# Patient Record
Sex: FEMALE | Race: WHITE | NOT HISPANIC OR LATINO | Employment: UNEMPLOYED | URBAN - METROPOLITAN AREA
[De-identification: names, ages, dates, MRNs, and addresses within clinical notes are randomized per-mention and may not be internally consistent; named-entity substitution may affect disease eponyms.]

---

## 2020-12-01 ENCOUNTER — OFFICE VISIT (OUTPATIENT)
Dept: FAMILY MEDICINE CLINIC | Facility: CLINIC | Age: 30
End: 2020-12-01

## 2020-12-01 VITALS
HEART RATE: 103 BPM | DIASTOLIC BLOOD PRESSURE: 84 MMHG | BODY MASS INDEX: 55.32 KG/M2 | OXYGEN SATURATION: 98 % | WEIGHT: 293 LBS | SYSTOLIC BLOOD PRESSURE: 122 MMHG | HEIGHT: 61 IN

## 2020-12-01 DIAGNOSIS — F33.2 SEVERE EPISODE OF RECURRENT MAJOR DEPRESSIVE DISORDER, WITHOUT PSYCHOTIC FEATURES (HCC): Primary | ICD-10-CM

## 2020-12-01 DIAGNOSIS — G47.00 INSOMNIA, UNSPECIFIED TYPE: ICD-10-CM

## 2020-12-01 DIAGNOSIS — Z23 FLU VACCINE NEED: ICD-10-CM

## 2020-12-01 DIAGNOSIS — F41.9 ANXIETY: ICD-10-CM

## 2020-12-01 DIAGNOSIS — F19.11 DRUG ABUSE IN REMISSION (HCC): ICD-10-CM

## 2020-12-01 DIAGNOSIS — E28.2 PCOS (POLYCYSTIC OVARIAN SYNDROME): ICD-10-CM

## 2020-12-01 PROCEDURE — 90471 IMMUNIZATION ADMIN: CPT | Performed by: NURSE PRACTITIONER

## 2020-12-01 PROCEDURE — 99204 OFFICE O/P NEW MOD 45 MIN: CPT | Performed by: NURSE PRACTITIONER

## 2020-12-01 PROCEDURE — 90686 IIV4 VACC NO PRSV 0.5 ML IM: CPT | Performed by: NURSE PRACTITIONER

## 2020-12-01 RX ORDER — TRAZODONE HYDROCHLORIDE 50 MG/1
50 TABLET ORAL NIGHTLY
COMMUNITY
End: 2020-12-01 | Stop reason: SDUPTHER

## 2020-12-01 RX ORDER — PAROXETINE 30 MG/1
30 TABLET, FILM COATED ORAL EVERY MORNING
COMMUNITY
End: 2020-12-01 | Stop reason: SDUPTHER

## 2020-12-01 RX ORDER — BUPROPION HYDROCHLORIDE 150 MG/1
300 TABLET ORAL DAILY
COMMUNITY
End: 2020-12-01 | Stop reason: SDUPTHER

## 2020-12-01 RX ORDER — HYDROXYZINE PAMOATE 50 MG/1
50 CAPSULE ORAL 3 TIMES DAILY PRN
COMMUNITY
End: 2020-12-01 | Stop reason: SDUPTHER

## 2020-12-01 RX ORDER — HYDROXYZINE PAMOATE 50 MG/1
50 CAPSULE ORAL 3 TIMES DAILY PRN
Qty: 90 CAPSULE | Refills: 5 | Status: SHIPPED | OUTPATIENT
Start: 2020-12-01 | End: 2021-03-22

## 2020-12-01 RX ORDER — BUPROPION HYDROCHLORIDE 150 MG/1
300 TABLET ORAL DAILY
Qty: 90 TABLET | Refills: 1 | Status: SHIPPED | OUTPATIENT
Start: 2020-12-01 | End: 2020-12-15

## 2020-12-01 RX ORDER — TRAZODONE HYDROCHLORIDE 50 MG/1
50 TABLET ORAL NIGHTLY
Qty: 90 TABLET | Refills: 1 | Status: SHIPPED | OUTPATIENT
Start: 2020-12-01 | End: 2021-03-22 | Stop reason: SDUPTHER

## 2020-12-01 RX ORDER — PAROXETINE 30 MG/1
30 TABLET, FILM COATED ORAL EVERY MORNING
Qty: 90 TABLET | Refills: 1 | Status: SHIPPED | OUTPATIENT
Start: 2020-12-01 | End: 2021-03-09

## 2020-12-01 NOTE — PROGRESS NOTES
Franca Ruiz presents today to establish care.    Establish Care (patient has been out of meds for about 3 days, high Ph-Q and DEWAYNE, patient reports that when she was one medication things were going good), Anxiety, Depression, and Nicotine Dependence     HPI   Pt presents today to establish care.  She has a past medical history of anxiety, arthritis, ovarian cyst, bipolar, and depression.  In June she entered rehab for drug abuse.  It was called [facility] and it was located in [place].  6 weeks ago she moved to a senior living house here in [place].  She realizes that she should have made an appointment sooner to get her medications refilled, but procrastinated.  She was started on Paxil, Wellbutrin, Vistaril, and Trazodone while she was at rehab.  She was doing well until she ran out of her medication.  She has been out of her medication for 3 days.  She had been taking the new dose of Paxil for a couple months.  She was only taking the medication every other day, because she knew she was going to run out.  Her depression and anxiety came back as soon as she ran out.  She only takes Vistaril as needed for anxiety.    She sleeps well as long as she takes her medication.  The trazodone has worked very well for her.    She takes Metformin for PCOS.  She denies any history of diabetes.  She last had lab work performed a couple months ago while she was in rehab.  The only abnormality she recalls is elevated liver enzymes.    She has gained 80 pounds since going into rehab.  Prior to this she did not care much about her health and did not focus on eating.  She is hoping to get started on a healthy eating regimen and starting routine physical activity.    She just started a job at [employer] and loves it.    She does not remember the last time she had a Pap smear as she was heavily involved in drugs.    Review of Systems   Constitutional: Positive for fatigue and unexpected weight gain.   HENT: Negative.    Eyes: Negative.     Respiratory: Negative.    Cardiovascular: Negative.    Gastrointestinal: Negative.    Endocrine: Negative.    Genitourinary: Negative.    Musculoskeletal: Positive for arthralgias.   Allergic/Immunologic: Negative.    Neurological: Negative.    Hematological: Negative.    Psychiatric/Behavioral: Positive for sleep disturbance and depressed mood. The patient is nervous/anxious.         Past Medical History:   Diagnosis Date   • Anxiety    • Arthritis    • Bilateral ovarian cysts    • Bipolar disorder (CMS/HCC)    • Depression         Past Surgical History:   Procedure Laterality Date   • ANKLE SURGERY Right         Family History   Problem Relation Age of Onset   • Mental illness Mother    • Arthritis Father    • Hypertension Father    • Mental illness Father    • Obesity Father    • Kidney disease Paternal Aunt    • Mental illness Paternal Aunt    • Mental illness Paternal Uncle         Social History     Socioeconomic History   • Marital status: Single     Spouse name: Not on file   • Number of children: Not on file   • Years of education: Not on file   • Highest education level: Not on file   Tobacco Use   • Smoking status: Former Smoker   • Smokeless tobacco: Never Used   Substance and Sexual Activity   • Alcohol use: Never     Frequency: Never   • Drug use: Not Currently     Types: Benzodiazepines, Methamphetamines, MDMA (ecstacy), Oxycodone     Comment: since June 2020        Current Outpatient Medications on File Prior to Visit   Medication Sig Dispense Refill   • [DISCONTINUED] buPROPion XL (WELLBUTRIN XL) 150 MG 24 hr tablet Take 300 mg by mouth Daily.     • [DISCONTINUED] hydrOXYzine pamoate (VISTARIL) 50 MG capsule Take 50 mg by mouth 3 (Three) Times a Day As Needed for Itching.     • [DISCONTINUED] metFORMIN (GLUCOPHAGE) 500 MG tablet Take 1,000 mg by mouth Daily With Breakfast.     • [DISCONTINUED] PARoxetine (PAXIL) 30 MG tablet Take 30 mg by mouth Every Morning.     • [DISCONTINUED] traZODone  "(DESYREL) 50 MG tablet Take 50 mg by mouth Every Night.       No current facility-administered medications on file prior to visit.        Allergies   Allergen Reactions   • Augmentin [Amoxicillin-Pot Clavulanate] Other (See Comments)     Throat blisters   • Vantin [Cefpodoxime] Other (See Comments)     Throat blisters        Vitals:    12/01/20 1225   BP: 122/84   BP Location: Left arm   Patient Position: Sitting   Cuff Size: Large Adult   Pulse: 103   SpO2: 98%   Weight: (!) 164 kg (362 lb 6.4 oz)   Height: 154.9 cm (61\")        Physical Exam  Vitals signs reviewed.   Constitutional:       Appearance: Normal appearance. She is obese.   HENT:      Head: Normocephalic and atraumatic.      Right Ear: Tympanic membrane, ear canal and external ear normal.      Left Ear: Tympanic membrane, ear canal and external ear normal.      Nose: Nose normal.      Mouth/Throat:      Mouth: Mucous membranes are moist.      Pharynx: Oropharynx is clear.   Cardiovascular:      Rate and Rhythm: Normal rate and regular rhythm.      Pulses: Normal pulses.      Heart sounds: Normal heart sounds.   Pulmonary:      Effort: Pulmonary effort is normal.      Breath sounds: Normal breath sounds.   Abdominal:      General: Bowel sounds are normal.      Palpations: Abdomen is soft.      Tenderness: There is no abdominal tenderness. There is no guarding or rebound.   Skin:     General: Skin is warm and dry.   Neurological:      General: No focal deficit present.      Mental Status: She is alert and oriented to person, place, and time.   Psychiatric:         Mood and Affect: Mood normal.         Behavior: Behavior normal.         Thought Content: Thought content normal.         Judgment: Judgment normal.        PHQ-9 Depression Screening  Little interest or pleasure in doing things? 3   Feeling down, depressed, or hopeless? 3   Trouble falling or staying asleep, or sleeping too much? 3   Feeling tired or having little energy? 3   Poor appetite or " overeating? 3   Feeling bad about yourself - or that you are a failure or have let yourself or your family down? 2   Trouble concentrating on things, such as reading the newspaper or watching television? 3   Moving or speaking so slowly that other people could have noticed? Or the opposite - being so fidgety or restless that you have been moving around a lot more than usual? 2   Thoughts that you would be better off dead, or of hurting yourself in some way? 1(no plans or actions, fleeting thoughts)   PHQ-9 Total Score 23   If you checked off any problems, how difficult have these problems made it for you to do your work, take care of things at home, or get along with other people? Extremely dIfficult     Diagnoses and all orders for this visit:    1. Severe episode of recurrent major depressive disorder, without psychotic features (CMS/HCC) (Primary) -chronic issue for patient that was well controlled with medication prior to running out.  Refill Wellbutrin 300 mg daily and Paxil 30 mg daily. PHQ-9 Total Score: 23.  Patient does have SI, but no plan or intention.  She feels like things will be much better once her medications are back in her system.  Continue with counseling monthly.  -     buPROPion XL (WELLBUTRIN XL) 150 MG 24 hr tablet; Take 2 tablets by mouth Daily.  Dispense: 90 tablet; Refill: 1  -     PARoxetine (PAXIL) 30 MG tablet; Take 1 tablet by mouth Every Morning.  Dispense: 90 tablet; Refill: 1    2. Insomnia, unspecified type -well-controlled with trazodone.  Refill trazodone 50 mg at bedtime.  -     traZODone (DESYREL) 50 MG tablet; Take 1 tablet by mouth Every Night.  Dispense: 90 tablet; Refill: 1    3. PCOS (polycystic ovarian syndrome) -she takes Metformin for this.  She denies history of diabetes.  Refill Metformin 1000 mg daily.  -     metFORMIN (GLUCOPHAGE) 1000 MG tablet; Take 1 tablet by mouth Daily With Breakfast.  Dispense: 90 tablet; Refill: 1    4. Anxiety -typically well controlled  with medication, but has been increased since running out 3 days ago.  Refill Wellbutrin and Paxil.  Refill Vistaril 50 mg 3 times a day as needed.  Robert 7 score 21.    -     hydrOXYzine pamoate (VISTARIL) 50 MG capsule; Take 1 capsule by mouth 3 (Three) Times a Day As Needed for Itching.  Dispense: 90 capsule; Refill: 5    5.  Drug Abuse in remission -patient has not used since June.  She was in a rehab called [facility] in [place].  Congratulated patient on her success.  She feels very positive about this and feels like she will be able to maintain long-term.    5. Flu vaccine need  -     Fluarix Quad >6 Months ([id number])    Patient has not had a Pap smear in many years.  She will schedule an appointment in 1 month for physical and Pap.    Patient signed ANGIE to get lab work results from rehab.    Return in about 4 weeks (around 12/29/2020) for Annual physical with pap.    Coral Pedro, APRN

## 2020-12-14 DIAGNOSIS — F33.2 SEVERE EPISODE OF RECURRENT MAJOR DEPRESSIVE DISORDER, WITHOUT PSYCHOTIC FEATURES (HCC): ICD-10-CM

## 2020-12-15 RX ORDER — BUPROPION HYDROCHLORIDE 150 MG/1
300 TABLET ORAL DAILY
Qty: 90 TABLET | Refills: 1 | Status: SHIPPED | OUTPATIENT
Start: 2020-12-15 | End: 2021-03-09 | Stop reason: SDUPTHER

## 2020-12-15 NOTE — TELEPHONE ENCOUNTER
Called [facility] spoke with Brianne the prescription was sent over wrong for 2 daily #90 made it 45 day fill they used the 1 refill and gave her a 90 day supply on 12/1/20, patient left or lost her whole prescription over the weekend so the pharmacy is requesting a new prescription be sent they have already spoken with her insurance yesterday to get it covered.

## 2021-03-09 ENCOUNTER — TELEMEDICINE (OUTPATIENT)
Dept: FAMILY MEDICINE CLINIC | Facility: CLINIC | Age: 31
End: 2021-03-09

## 2021-03-09 DIAGNOSIS — E28.2 PCOS (POLYCYSTIC OVARIAN SYNDROME): ICD-10-CM

## 2021-03-09 DIAGNOSIS — F33.2 SEVERE EPISODE OF RECURRENT MAJOR DEPRESSIVE DISORDER, WITHOUT PSYCHOTIC FEATURES (HCC): ICD-10-CM

## 2021-03-09 DIAGNOSIS — E66.01 MORBID (SEVERE) OBESITY DUE TO EXCESS CALORIES (HCC): Primary | ICD-10-CM

## 2021-03-09 DIAGNOSIS — R53.83 FATIGUE, UNSPECIFIED TYPE: ICD-10-CM

## 2021-03-09 DIAGNOSIS — F19.11 DRUG ABUSE IN REMISSION (HCC): ICD-10-CM

## 2021-03-09 PROCEDURE — 99214 OFFICE O/P EST MOD 30 MIN: CPT | Performed by: NURSE PRACTITIONER

## 2021-03-09 RX ORDER — BUPROPION HYDROCHLORIDE 150 MG/1
300 TABLET ORAL DAILY
Qty: 90 TABLET | Refills: 1 | Status: SHIPPED | OUTPATIENT
Start: 2021-03-09 | End: 2021-03-22 | Stop reason: SDUPTHER

## 2021-03-09 RX ORDER — NALTREXONE HYDROCHLORIDE 50 MG/1
50 TABLET, FILM COATED ORAL DAILY
Qty: 30 TABLET | Refills: 2 | Status: SHIPPED | OUTPATIENT
Start: 2021-03-09 | End: 2021-05-19 | Stop reason: ALTCHOICE

## 2021-03-09 NOTE — PROGRESS NOTES
Chief Complaint  Depression    You have chosen to receive care through a telehealth visit.  Do you consent to use a video/audio connection for your medical care today? Yes    Subjective          Franca Ruiz presents to [facility]     History of Present Illness  Pt states that she has been doing ok since her last visit.  She has been thinking about the weight loss issue she has been having.  She had talked last time about doing something about this.  She thinks that her weight has stayed the same.  She has made some changes to her lifestyle.  She has been eating better and has been more active.  She walks 4 days per week for 30 minutes to 1 hour.  She has started a new job where she is more active.  She works at [employer].  She is interested in starting at the bariatric clinic.    Her mood is doing well.  Her sleep is staying stable.    She has been having cravings.  She took Naltrexone while she was in rehab.  She had to go back to rehab on 1/27 to 2/25.  She feels like sometimes she does ok.  She is worried about the cravings coming back again and this causing her to backslide.  She would really like to stay sober.  She would like to restart Naltrexone.    Objective   Vital Signs:   There were no vitals taken for this visit.      Physical Exam  Constitutional:       Appearance: Normal appearance.   HENT:      Head: Normocephalic and atraumatic.   Neurological:      General: No focal deficit present.      Mental Status: She is alert and oriented to person, place, and time.   Psychiatric:         Mood and Affect: Mood normal.         Behavior: Behavior normal.         Thought Content: Thought content normal.         Judgment: Judgment normal.          Result Review :                 Assessment and Plan    Diagnoses and all orders for this visit:    1. Morbid (severe) obesity due to excess calories (CMS/HCC) (Primary) -patient states that she is not lost any weight since her last visit  despite changes to her diet and being more active.  She states in the past that her provider was going to prescribe phentermine.  Discussed that this medication can only be taken for 12 weeks and after that she would have to stop it and her weight is likely to be regained.  Patient verbalized understanding.  Discussed referral to bariatric surgery and patient is agreeable.  She is taking Wellbutrin and will be starting Naltrexone which both can help with weight loss also.  BMI 56.68.  -     Ambulatory Referral to Bariatric Surgery    2. Fatigue, unspecified type -patient states that she has been staying very tired.  Will check labs to ensure there are no abnormalities contributing to this.    -     CBC & Differential; Future  -     Comprehensive Metabolic Panel; Future  -     TSH Rfx On Abnormal To Free T4; Future  -     Vitamin D 25 Hydroxy; Future  -     Vitamin B12; Future    3. Drug abuse in remission (CMS/HCC) -pt did have a relapse in January and went back to rehab for 1 month.  Pt states that she is doing ok at present, but does feel like she has been having issues with cravings again.  She would like to restart Naltrexone.  She took it while she was in rehab and tolerated it well.  Start Naltrexone 50mg daily.  Will refer patient to behavioral health for counseling.  -     naltrexone (DEPADE) 50 MG tablet; Take 1 tablet by mouth Daily.  Dispense: 30 tablet; Refill: 2    4. Severe episode of recurrent major depressive disorder, without psychotic features (CMS/HCC) -patient feels like her mood is doing well at present.  Continue Wellbutrin 300 mg daily.  -     buPROPion XL (WELLBUTRIN XL) 150 MG 24 hr tablet; Take 2 tablets by mouth Daily.  Dispense: 90 tablet; Refill: 1    5. PCOS (polycystic ovarian syndrome) -she has not taken Metformin since going back into rehab.  Refill Metformin 1000 mg daily.  -     metFORMIN (GLUCOPHAGE) 1000 MG tablet; Take 1 tablet by mouth Daily With Breakfast.  Dispense: 90  tablet; Refill: 1      Follow Up   Return in about 4 weeks (around 4/6/2021) for pap smear.  Patient was given instructions and counseling regarding her condition or for health maintenance advice. Please see specific information pulled into the AVS if appropriate.

## 2021-03-22 ENCOUNTER — TELEMEDICINE (OUTPATIENT)
Dept: PSYCHIATRY | Facility: CLINIC | Age: 31
End: 2021-03-22

## 2021-03-22 DIAGNOSIS — G47.9 SLEEP DIFFICULTIES: Chronic | ICD-10-CM

## 2021-03-22 DIAGNOSIS — F15.20: Primary | Chronic | ICD-10-CM

## 2021-03-22 DIAGNOSIS — F41.1 GENERALIZED ANXIETY DISORDER: Chronic | ICD-10-CM

## 2021-03-22 DIAGNOSIS — F39 UNSPECIFIED MOOD (AFFECTIVE) DISORDER (HCC): Chronic | ICD-10-CM

## 2021-03-22 PROCEDURE — 90792 PSYCH DIAG EVAL W/MED SRVCS: CPT | Performed by: NURSE PRACTITIONER

## 2021-03-22 RX ORDER — BUPROPION HYDROCHLORIDE 300 MG/1
300 TABLET ORAL EVERY MORNING
Qty: 30 TABLET | Refills: 0 | Status: SHIPPED | OUTPATIENT
Start: 2021-03-22 | End: 2021-04-21 | Stop reason: SDUPTHER

## 2021-03-22 RX ORDER — ARIPIPRAZOLE 5 MG/1
5 TABLET ORAL NIGHTLY
Qty: 30 TABLET | Refills: 0 | Status: SHIPPED | OUTPATIENT
Start: 2021-03-22 | End: 2021-04-21 | Stop reason: SINTOL

## 2021-03-22 RX ORDER — TRAZODONE HYDROCHLORIDE 50 MG/1
TABLET ORAL
Qty: 30 TABLET | Refills: 0 | Status: SHIPPED | OUTPATIENT
Start: 2021-03-22 | End: 2021-07-14

## 2021-03-22 RX ORDER — BUSPIRONE HYDROCHLORIDE 10 MG/1
TABLET ORAL
Qty: 60 TABLET | Refills: 0 | Status: SHIPPED | OUTPATIENT
Start: 2021-03-22 | End: 2021-04-21 | Stop reason: SDUPTHER

## 2021-03-22 NOTE — TREATMENT PLAN
Multi-Disciplinary Problems (from Behavioral Health Treatment Plan)    Active Problems     Problem: Depression  Start Date: 03/22/21    Problem Details: The patient self-scales this problem as a 8 with 10 being the worst.        Goal Priority Start Date Expected End Date End Date    Patient will demonstrate the ability to initiate new constructive life skills outside of sessions on a consistent basis. -- 03/22/21 -- --    Goal Details: Progress toward goal:  Not appropriate to rate progress toward goal since this is the initial treatment plan.        Goal Intervention Frequency Start Date End Date    Assist patient in setting attainable activities of daily living goals. PRN 03/22/21 --    Goal Intervention Frequency Start Date End Date    Provide education about depression Q Month 03/22/21 --    Intervention Details: Duration of treatment until until remission of symptoms.        Goal Intervention Frequency Start Date End Date    Assist patient in developing healthy coping strategies. Q Month 03/22/21 --    Intervention Details: Duration of treatment until until remission of symptoms.              Problem: Mood Instability  Start Date: 03/22/21    Problem Details: The patient self-scales this problem as a 6 with 10 being the worst.        Goal Priority Start Date Expected End Date End Date    Patient will achieve mood stability as evidenced by controlled behavior and a more deliberate thought process -- 03/22/21 -- --    Goal Details: Progress toward goal:  Not appropriate to rate progress toward goal since this is the initial treatment plan.        Goal Intervention Frequency Start Date End Date    Provide structure and focus to patient's thoughts and actions by establishing plans and routine. Q Month 03/22/21 --    Intervention Details: Duration of treatment until until remission of symptoms.        Goal Intervention Frequency Start Date End Date    Assist patient in setting responsible goals and limits in  behavior. Q Month 03/22/21 --    Intervention Details: Duration of treatment until until remission of symptoms.                           I have discussed and reviewed this treatment plan with the patient and/or guardian.  The patient has verbally agreed with this treatment plan (no signatures are obtained at today's visit as the patient is a telehealth patient and is unable to print and sign this document, therefore verbal agreement is obtained).  .
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
## 2021-03-24 ENCOUNTER — TELEMEDICINE (OUTPATIENT)
Dept: PSYCHIATRY | Facility: CLINIC | Age: 31
End: 2021-03-24

## 2021-03-24 DIAGNOSIS — F33.1 MODERATE EPISODE OF RECURRENT MAJOR DEPRESSIVE DISORDER (HCC): Primary | ICD-10-CM

## 2021-03-24 PROCEDURE — 90791 PSYCH DIAGNOSTIC EVALUATION: CPT | Performed by: COUNSELOR
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
## 2021-03-27 PROCEDURE — U0004 COV-19 TEST NON-CDC HGH THRU: HCPCS | Performed by: FAMILY MEDICINE

## 2021-04-16 ENCOUNTER — TELEMEDICINE (OUTPATIENT)
Dept: PSYCHIATRY | Facility: CLINIC | Age: 31
End: 2021-04-16

## 2021-04-16 DIAGNOSIS — F33.1 MODERATE EPISODE OF RECURRENT MAJOR DEPRESSIVE DISORDER (HCC): Primary | ICD-10-CM

## 2021-04-16 PROCEDURE — 90832 PSYTX W PT 30 MINUTES: CPT | Performed by: COUNSELOR

## 2021-04-16 NOTE — PROGRESS NOTES
Date: April 19, 2021  Time In: 12:28 PM  Time Out: 1:03 PM  This provider is located at the [facility] (through [facility]), [address], [place] [address] using a secure [employer] Video Visit through [employer]. Patient is being seen remotely via telehealth at home address in [place] and stated they are in a secure environment for this session. The patient's condition being diagnosed/treated is appropriate for telemedicine. The provider identified herself as well as her credentials. The patient, and/or patients guardian, consent to be seen remotely, and when consent is given they understand that the consent allows for patient identifiable information to be sent to a third party as needed. They may refuse to be seen remotely at any time. The electronic data is encrypted and password protected, and the patient and/or guardian has been advised of the potential risks to privacy not withstanding such measures.     You have chosen to receive care through a telehealth visit.  Do you consent to use a video/audio connection for your medical care today? Yes    PROGRESS NOTE  Data:  Franca Ruiz is a 30 y.o. female who presents today for follow up and care planning. Patient was able to identify the need to improve her coping skills and process feelings of anger and anxiety. Patient lost a half brother to a drug overdose last week and states that this incident put a lot of things in perspective for her. Patient states that she is going to start MAT and receive drug counseling through that program and wants to receive therapy through [facility] for other symptoms and to process her past.     Chief Complaint: feelings of grief, anger, and anxiety and self-reflection    History of Present Illness: Patient has dealt with issues of anxiety and depression since she was a young child.      Clinical Maneuvering/Intervention: Care planning and discussion of problem areas using CBT that have  led to the need for therapy and the recent loss of the patient's half brother    (Scales based on 0 - 10 with 10 being the worst)  Depression: 7 Anxiety: 6       Assisted patient in developing a care plan to meet her concerns. Also assisted patient with processing above session content; acknowledged and normalized patient’s thoughts, feelings, and concerns in regard to the need for therapy and self-reflection/grief in regard to the loss of her brother.  Rationalized patient thought process regarding past trauma and abandonment.  Discussed triggers associated with patient's anger and anxiety.  Also discussed coping skills for patient to implement such as self-care, positive self-talk and increasing methods of accountability.    Allowed patient to freely discuss issues without interruption or judgment. Provided safe, confidential environment to facilitate the development of positive therapeutic relationship and encourage open, honest communication. Assisted patient in identifying risk factors which would indicate the need for higher level of care including thoughts to harm self or others and/or self-harming behavior and encouraged patient to contact this office, call 911, or present to the nearest emergency room should any of these events occur. Discussed crisis intervention services and means to access. Patient adamantly and convincingly denies current suicidal or homicidal ideation or perceptual disturbance.    Assessment:   Assessment   Patient was able to establish as baseline for the identified problems on her care plan.  However, patient continues to struggle with anger and anxiety but was proud to report that she is now at her longest time period with out a relapse but continues to have impairment in important areas of functioning.  A result, they can be reasonably expected to continue to benefit from treatment and would likely be at increased risk for decompensation otherwise.    Mental Status Exam:   Hygiene:    good  Cooperation:  Cooperative  Eye Contact:  Good  Psychomotor Behavior:  Appropriate  Affect:  Appropriate  Mood: fluctates  Speech:  Normal  Thought Process:  Goal directed  Thought Content:  Normal  Suicidal:  None  Homicidal:  None  Hallucinations:  None  Delusion:  None  Memory:  Deficits- some from substance use  Orientation:  Person, Place, Time and Situation  Reliability:  fair  Insight:  Good  Judgement:  Fair  Impulse Control:  Fair  Physical/Medical Issues:  No        Patient's Support Network Includes:  significant other and father    Functional Status: Moderate impairment     Progress toward goal: Not at goal; goals and baselines were established today.    Prognosis: Guarded with Ongoing Treatment         Plan:    Patient will continue in individual outpatient therapy with focus on improved functioning and coping skills, maintaining stability, and avoiding decompensation and the need for higher level of care.    Patient will adhere to medication regimen as prescribed and report any side effects. Patient will contact this office, call 911 or present to the nearest emergency room should suicidal or homicidal ideations occur. Provide Cognitive Behavioral Therapy and Solution Focused Therapy to improve functioning, maintain stability, and avoid decompensation and the need for higher level of care.     Return in about 4 weeks, or earlier if symptoms worsen or fail to improve.           VISIT DIAGNOSIS:     ICD-10-CM ICD-9-CM   1. Moderate episode of recurrent major depressive disorder (CMS/[facility])  F33.1 296.32             This document has been electronically signed by DELVIN Osullivan  April 19, 2021 10:51 EDT      Part of this note may be an electronic transcription/translation of spoken language to printed text using the Dragon Dictation System.

## 2021-04-21 ENCOUNTER — PRIOR AUTHORIZATION (OUTPATIENT)
Dept: PSYCHIATRY | Facility: CLINIC | Age: 31
End: 2021-04-21

## 2021-04-21 ENCOUNTER — TELEMEDICINE (OUTPATIENT)
Dept: PSYCHIATRY | Facility: CLINIC | Age: 31
End: 2021-04-21

## 2021-04-21 DIAGNOSIS — F31.30 BIPOLAR I DISORDER, MOST RECENT EPISODE DEPRESSED (HCC): Primary | Chronic | ICD-10-CM

## 2021-04-21 DIAGNOSIS — F15.20: Chronic | ICD-10-CM

## 2021-04-21 DIAGNOSIS — G47.9 SLEEP DIFFICULTIES: Chronic | ICD-10-CM

## 2021-04-21 DIAGNOSIS — F41.1 GENERALIZED ANXIETY DISORDER: Chronic | ICD-10-CM

## 2021-04-21 PROCEDURE — 99214 OFFICE O/P EST MOD 30 MIN: CPT | Performed by: NURSE PRACTITIONER

## 2021-04-21 RX ORDER — BUSPIRONE HYDROCHLORIDE 10 MG/1
TABLET ORAL
Qty: 60 TABLET | Refills: 0 | Status: SHIPPED | OUTPATIENT
Start: 2021-04-21 | End: 2021-06-14 | Stop reason: SDUPTHER

## 2021-04-21 RX ORDER — BUPROPION HYDROCHLORIDE 300 MG/1
300 TABLET ORAL EVERY MORNING
Qty: 30 TABLET | Refills: 0 | Status: SHIPPED | OUTPATIENT
Start: 2021-04-21 | End: 2021-06-14 | Stop reason: SDUPTHER
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
## 2021-05-06 ENCOUNTER — PRIOR AUTHORIZATION (OUTPATIENT)
Dept: PSYCHIATRY | Facility: CLINIC | Age: 31
End: 2021-05-06

## 2021-05-06 NOTE — TELEPHONE ENCOUNTER
KAVITA HAS ALREADY DONE THE PA ON THIS MEDICATION FOR PATIENT , CALLED THE PHARMACY AND HAD THEM TO RERUN IT , APPROVED

## 2021-05-19 ENCOUNTER — TELEMEDICINE (OUTPATIENT)
Dept: PSYCHIATRY | Facility: CLINIC | Age: 31
End: 2021-05-19

## 2021-05-19 DIAGNOSIS — F41.1 GENERALIZED ANXIETY DISORDER: Chronic | ICD-10-CM

## 2021-05-19 DIAGNOSIS — G47.9 SLEEP DIFFICULTIES: Chronic | ICD-10-CM

## 2021-05-19 DIAGNOSIS — F15.20: Chronic | ICD-10-CM

## 2021-05-19 DIAGNOSIS — F31.30 BIPOLAR I DISORDER, MOST RECENT EPISODE DEPRESSED (HCC): Primary | Chronic | ICD-10-CM

## 2021-05-19 PROCEDURE — 99214 OFFICE O/P EST MOD 30 MIN: CPT | Performed by: NURSE PRACTITIONER

## 2021-05-19 RX ORDER — BUPRENORPHINE HYDROCHLORIDE AND NALOXONE HYDROCHLORIDE DIHYDRATE 8; 2 MG/1; MG/1
1 TABLET SUBLINGUAL 2 TIMES DAILY
COMMUNITY
Start: 2021-04-28

## 2021-05-19 NOTE — PROGRESS NOTES
"This provider is located at the [facility] (through [facility]), [address], [facility], [address] using a secure Rainhart Video Visit through [employer]. Patient is being seen remotely via telehealth at their home address in [place], and stated they are in a secure environment for this session. The patient's condition being diagnosed/treated is appropriate for telemedicine. The provider identified herself as well as her credentials.   The patient, and/or patients guardian, consent to be seen remotely, and when consent is given they understand that the consent allows for patient identifiable information to be sent to a third party as needed.   They may refuse to be seen remotely at any time. The electronic data is encrypted and password protected, and the patient and/or guardian has been advised of the potential risks to privacy not withstanding such measures.    You have chosen to receive care through a telehealth visit.  Do you consent to use a video/audio connection for your medical care today? Yes        Subjective   Franca Ruiz is a 30 y.o. female who presents today for follow up    Chief Complaint:  Depression, anxiety, substance abuse    Accompanied by: Pt was alone for duration of appointment    History of Present Illness:   Pt reports that she is frustrated because she is out of the Abilify and she hasn't been able to start the Vraylar. Our records shows that it has been approved, but the pharmacy keeps telling pt is hasn't been. Pt's mood has been \"shitty\". She hasn't been on the Abilify for about two weeks and \"I've been rolling with my wonderful personality\". Pt states this sarcastically. Pt is sleeping well since she has been on the Suboxone. She started it about three weeks ago. Pt finds it effective. Appetite has been \"nonexistent\". Pt reports weight loss, but cannot give the number of lbs lost. Pt has not been very social with others. Pt has been avoiding other " "people \"as much as possible\". The patient denies any new medical problems since last appointment with this facility. The patient reports compliance with current medication regimen. The patient denies any current side effects from her current medication regimen. The patient denies any abnormal muscle movements or tics. The patient denies any suicidal or homicidal ideations, plans, or intent at today's encounter and is convincing.  The patient denies any auditory hallucinations or visual hallucinations. The patient does not endorse any significant symptoms consistent with leidy or psychosis at today's encounter.        Prior Psychiatric Medications:  Wellbutrin XL: is helpful  Naltrexone: is helpful   Hydroxyzine: helps, but is sedating  Trazodone: takes PRN and is helpful  Buspar: unsure if effective  Depakote ER: may be beneficial  Paxil:   Zoloft  Celexa  Prozac: may have been helpful  Cymbalta: may have been helpful  Seroquel: side effects; too sedating  Abilify: sedating        The following portions of the patient's history were reviewed and updated as appropriate: allergies, current medications, past family history, past medical history, past social history, past surgical history and problem list.          Past Medical History:  Past Medical History:   Diagnosis Date   • Anxiety    • Arthritis    • Bilateral ovarian cysts    • Bipolar disorder (CMS/[facility])    • Depression        Social History:  Social History     Socioeconomic History   • Marital status: Single     Spouse name: Not on file   • Number of children: Not on file   • Years of education: Not on file   • Highest education level: Not on file   Tobacco Use   • Smoking status: Former Smoker   • Smokeless tobacco: Never Used   Substance and Sexual Activity   • Alcohol use: Never   • Drug use: Not Currently     Types: Benzodiazepines, Methamphetamines, MDMA (ecstacy), Oxycodone     Comment: January 21, 2021 (Last use)   • Sexual activity: Yes     Birth " control/protection: Condom       Family History:  Family History   Problem Relation Age of Onset   • Mental illness Mother    • Arthritis Father    • Hypertension Father    • Mental illness Father    • Obesity Father    • Bipolar disorder Father    • Alcohol abuse Father    • Kidney disease Paternal Aunt    • Mental illness Paternal Aunt    • Bipolar disorder Paternal Aunt    • Mental illness Paternal Uncle        Past Surgical History:  Past Surgical History:   Procedure Laterality Date   • ANKLE SURGERY Right        Problem List:  Patient Active Problem List   Diagnosis   • Moderate episode of recurrent major depressive disorder (CMS/HCC)       Allergy:   Allergies   Allergen Reactions   • Augmentin [Amoxicillin-Pot Clavulanate] Other (See Comments)     Throat blisters   • Vantin [Cefpodoxime] Other (See Comments)     Throat blisters        Current Medications:   Current Outpatient Medications   Medication Sig Dispense Refill   • buprenorphine-naloxone (SUBOXONE) 8-2 MG per SL tablet      • buPROPion XL (Wellbutrin XL) 300 MG 24 hr tablet Take 1 tablet by mouth Every Morning for 30 days. 30 tablet 0   • busPIRone (BUSPAR) 10 MG tablet Take 1 tablet PO BID 60 tablet 0   • Cariprazine HCl (Vraylar) 1.5 MG capsule capsule Take 1 capsule by mouth Daily for 30 days. 30 capsule 0   • metFORMIN (GLUCOPHAGE) 1000 MG tablet Take 1 tablet by mouth Daily With Breakfast. 90 tablet 1   • traZODone (DESYREL) 50 MG tablet Take 1/2-1 tablet PO QHS PRN for sleep 30 tablet 0     No current facility-administered medications for this visit.       Review of Symptoms:    Review of Systems   Constitutional: Positive for activity change, appetite change, fatigue and unexpected weight loss.   Psychiatric/Behavioral: Positive for agitation, decreased concentration, dysphoric mood, sleep disturbance, depressed mood and stress. The patient is nervous/anxious.          Physical Exam:   Due to the remote nature of this encounter (virtual  encounter), vitals were unable to be obtained.  Height stated at 61 inches.  Weight stated at 275 pounds.      Physical Exam  Neurological:      Mental Status: She is alert.   Psychiatric:         Attention and Perception: Attention and perception normal.         Mood and Affect: Mood is depressed. Affect is blunt.         Speech: Speech normal.         Behavior: Behavior is agitated.         Thought Content: Thought content normal. Thought content is not paranoid or delusional. Thought content does not include homicidal or suicidal ideation. Thought content does not include homicidal or suicidal plan.         Cognition and Memory: Cognition and memory normal.         Judgment: Judgment is impulsive.           Mental Status Exam:   Hygiene:   fair  Cooperation:  Cooperative  Eye Contact:  Poor  Psychomotor Behavior:  Appropriate  Affect:  Blunted  Mood: irritable  Speech:  Normal  Thought Process:  Goal directed  Thought Content:  Normal  Suicidal:  None  Homicidal:  None  Hallucinations:  None  Delusion:  None  Memory:  Intact  Orientation:  Person, Place, Time and Situation  Reliability:  fair  Insight:  Fair  Judgement:  Fair  Impulse Control:  Fair  Physical/Medical Issues:  No          Previous Provider notes and available records reviewed by this APRN at today's encounter.         Lab Results:   No visits with results within 1 Month(s) from this visit.   Latest known visit with results is:   Admission on 03/27/2021, Discharged on 03/27/2021   Component Date Value Ref Range Status   • SARS-CoV-2 SCOTT 03/27/2021 Not Detected  Not Detected Final         Assessment/Plan   Problems Addressed this Visit     None      Visit Diagnoses     Bipolar I disorder, most recent episode depressed (CMS/HCC)  (Chronic)   -  Primary    Generalized anxiety disorder  (Chronic)       Amphetamine-type substance use disorder, moderate (CMS/HCC)  (Chronic)       Sleep difficulties  (Chronic)         Diagnoses       Codes Comments     Bipolar I disorder, most recent episode depressed (CMS/HCC)    -  Primary ICD-10-CM: F31.30  ICD-9-CM: 296.50     Generalized anxiety disorder     ICD-10-CM: F41.1  ICD-9-CM: 300.02     Amphetamine-type substance use disorder, moderate (CMS/HCC)     ICD-10-CM: F15.20  ICD-9-CM: 305.70     Sleep difficulties     ICD-10-CM: G47.9  ICD-9-CM: 780.50           Visit Diagnoses:    ICD-10-CM ICD-9-CM   1. Bipolar I disorder, most recent episode depressed (CMS/HCC)  F31.30 296.50   2. Generalized anxiety disorder  F41.1 300.02   3. Amphetamine-type substance use disorder, moderate (CMS/HCC)  F15.20 305.70   4. Sleep difficulties  G47.9 780.50          GOALS:  Short Term Goals: Patient will be compliant with medication, and patient will have no significant medication related side effects.  Patient will be engaged in psychotherapy as indicated.  Patient will report subjective improvement of symptoms.  Long term goals: To stabilize mood and treat/improve subjective symptoms, the patient will stay out of the hospital, the patient will be at an optimal level of functioning, and the patient will take all medications as prescribed.  The patient verbalized understanding and agreement with goals that were mutually set.      TREATMENT PLAN:   -Continue Trazodone 25-50 mg PO QHS PRN for sleep.   -Continue Buspar 10 mg PO BID for anxiety.   -Continue Wellbutrin  mg PO QAM for depression.   -Pt is prescribed Suboxone from another provider  -Start Vraylar 1.5 mg PO Daily for bipolar disorder. (Pt hasn't started it from last appointment)    Medication and treatment options, both pharmacological and non-pharmacological treatment options, discussed during today's visit, including any off label use of medication. Patient acknowledged and verbally consented with current treatment plan and was educated on the importance of compliance with treatment and follow-up appointments.        MEDICATION ISSUES:    Discussed treatment plan and  medication options of prescribed medication as well as the risks, benefits, any black box warnings, and side effects including potential falls, possible impaired driving, and metabolic adversities among others, including any off label use of medication. Patient is agreeable to call the office with any worsening of symptoms or onset of side effects, or if any concerns or questions arise.  The contact information for the office is made available to the patient. Patient is agreeable to call 911 or go to the nearest ER should they begin having any SI/HI, or if any urgent concerns arise. No medication side effects or related complaints today.       MEDS ORDERED DURING VISIT:  No orders of the defined types were placed in this encounter.      Return in about 4 weeks (around 6/16/2021), or if symptoms worsen or fail to improve, for Recheck.     Treatment plan completed: 3/22/21    Progress toward goal: Not at goal    Functional Status: Severe impairment    Prognosis: Fair with Ongoing Treatment         This document has been electronically signed by JENNIFER Reed  May 19, 2021 09:57 EDT    Please note that portions of this note were completed with a voice recognition program. Efforts were made to edit dictation, but occasionally words are mistranscribed.

## 2021-05-20 ENCOUNTER — TELEMEDICINE (OUTPATIENT)
Dept: PSYCHIATRY | Facility: CLINIC | Age: 31
End: 2021-05-20

## 2021-05-20 DIAGNOSIS — F33.1 MODERATE EPISODE OF RECURRENT MAJOR DEPRESSIVE DISORDER (HCC): Primary | ICD-10-CM

## 2021-05-20 PROCEDURE — 90834 PSYTX W PT 45 MINUTES: CPT | Performed by: COUNSELOR

## 2021-06-14 ENCOUNTER — TELEMEDICINE (OUTPATIENT)
Dept: PSYCHIATRY | Facility: CLINIC | Age: 31
End: 2021-06-14

## 2021-06-14 DIAGNOSIS — F31.30 BIPOLAR I DISORDER, MOST RECENT EPISODE DEPRESSED (HCC): Primary | Chronic | ICD-10-CM

## 2021-06-14 DIAGNOSIS — G47.9 SLEEP DIFFICULTIES: Chronic | ICD-10-CM

## 2021-06-14 DIAGNOSIS — F41.1 GENERALIZED ANXIETY DISORDER: Chronic | ICD-10-CM

## 2021-06-14 DIAGNOSIS — F15.20: Chronic | ICD-10-CM

## 2021-06-14 PROCEDURE — 99214 OFFICE O/P EST MOD 30 MIN: CPT | Performed by: NURSE PRACTITIONER

## 2021-06-14 RX ORDER — BUSPIRONE HYDROCHLORIDE 10 MG/1
TABLET ORAL
Qty: 60 TABLET | Refills: 0 | Status: SHIPPED | OUTPATIENT
Start: 2021-06-14 | End: 2021-07-14 | Stop reason: SDUPTHER

## 2021-06-14 RX ORDER — BUPROPION HYDROCHLORIDE 300 MG/1
300 TABLET ORAL EVERY MORNING
Qty: 30 TABLET | Refills: 0 | Status: SHIPPED | OUTPATIENT
Start: 2021-06-14 | End: 2021-07-14 | Stop reason: SDUPTHER

## 2021-06-14 NOTE — PROGRESS NOTES
"This provider is located at the [facility] (through [facility]), [address], [place], [address] using a secure neo[employer] Video Visit through [employer]. Patient is being seen remotely via telehealth at their home address in [place], and stated they are in a secure environment for this session. The patient's condition being diagnosed/treated is appropriate for telemedicine. The provider identified herself as well as her credentials.   The patient, and/or patients guardian, consent to be seen remotely, and when consent is given they understand that the consent allows for patient identifiable information to be sent to a third party as needed.   They may refuse to be seen remotely at any time. The electronic data is encrypted and password protected, and the patient and/or guardian has been advised of the potential risks to privacy not withstanding such measures.    You have chosen to receive care through a telehealth visit.  Do you consent to use a video/audio connection for your medical care today? Yes        Subjective   Franca Ruiz is a 31 y.o. female who presents today for follow up    Chief Complaint:  Depression, anxiety, substance abuse    Accompanied by: Pt was alone for duration of appointment    History of Present Illness:   Pt reports that the Vraylar has been helping with her overall mood. Depression has improved, it tends to \"come and go\". Agitation has also improved and she is getting along with others better. However, her mood is not where she would like it to be. Sleep has been excessive, but she feels it is related to having sleep apnea and not having a machine. Pt is trying to establish care to get a machine. Pt hasn't been using the Trazodone, she hasn't been needing it.Appetite is fair. Pt has been doing well on the Suboxone. She has been clean for about 5 months. The patient denies any new medical problems or changes in medications since last appointment " with this facility. The patient reports compliance with current medication regimen. The patient denies any current side effects from her current medication regimen. The patient denies any abnormal muscle movements or tics. The patient rates their depression at a 6-7/10 on a 0-10 scale, with 10 being the worst. The patient would like to increase their medications at this visit. The patient denies any suicidal or homicidal ideations, plans, or intent at today's encounter and is convincing.  The patient denies any auditory hallucinations or visual hallucinations. The patient does not endorse any significant symptoms consistent with leidy or psychosis at today's encounter.        Prior Psychiatric Medications:  Wellbutrin XL: is helpful  Naltrexone: is helpful   Hydroxyzine: helps, but is sedating  Trazodone: takes PRN and is helpful  Buspar: unsure if effective  Depakote ER: may be beneficial  Paxil:   Zoloft  Celexa  Prozac: may have been helpful  Cymbalta: may have been helpful  Seroquel: side effects; too sedating  Abilify: sedating        The following portions of the patient's history were reviewed and updated as appropriate: allergies, current medications, past family history, past medical history, past social history, past surgical history and problem list.          Past Medical History:  Past Medical History:   Diagnosis Date   • Anxiety    • Arthritis    • Bilateral ovarian cysts    • Bipolar disorder (CMS/HCC)    • Depression    • Sleep apnea        Social History:  Social History     Socioeconomic History   • Marital status: Single     Spouse name: Not on file   • Number of children: Not on file   • Years of education: Not on file   • Highest education level: Not on file   Tobacco Use   • Smoking status: Former Smoker   • Smokeless tobacco: Never Used   Substance and Sexual Activity   • Alcohol use: Never   • Drug use: Not Currently     Types: Benzodiazepines, Methamphetamines, MDMA (ecstacy), Oxycodone      Comment: January 21, 2021 (Last use)   • Sexual activity: Yes     Birth control/protection: Condom       Family History:  Family History   Problem Relation Age of Onset   • Mental illness Mother    • Arthritis Father    • Hypertension Father    • Mental illness Father    • Obesity Father    • Bipolar disorder Father    • Alcohol abuse Father    • Kidney disease Paternal Aunt    • Mental illness Paternal Aunt    • Bipolar disorder Paternal Aunt    • Mental illness Paternal Uncle        Past Surgical History:  Past Surgical History:   Procedure Laterality Date   • ANKLE SURGERY Right        Problem List:  Patient Active Problem List   Diagnosis   • Moderate episode of recurrent major depressive disorder (CMS/HCC)       Allergy:   Allergies   Allergen Reactions   • Augmentin [Amoxicillin-Pot Clavulanate] Other (See Comments)     Throat blisters   • Vantin [Cefpodoxime] Other (See Comments)     Throat blisters        Current Medications:   Current Outpatient Medications   Medication Sig Dispense Refill   • buprenorphine-naloxone (SUBOXONE) 8-2 MG per SL tablet      • buPROPion XL (Wellbutrin XL) 300 MG 24 hr tablet Take 1 tablet by mouth Every Morning for 30 days. 30 tablet 0   • busPIRone (BUSPAR) 10 MG tablet Take 1 tablet PO BID 60 tablet 0   • Cariprazine HCl (Vraylar) 3 MG capsule capsule Take 1 capsule by mouth Daily for 30 days. 30 capsule 0   • metFORMIN (GLUCOPHAGE) 1000 MG tablet Take 1 tablet by mouth Daily With Breakfast. 90 tablet 1   • traZODone (DESYREL) 50 MG tablet Take 1/2-1 tablet PO QHS PRN for sleep 30 tablet 0     No current facility-administered medications for this visit.       Review of Symptoms:    Review of Systems   Constitutional: Positive for appetite change and fatigue.   Psychiatric/Behavioral: Positive for agitation, dysphoric mood, sleep disturbance and stress. The patient is nervous/anxious.          Physical Exam:   Due to the remote nature of this encounter (virtual encounter),  vitals were unable to be obtained.  Height stated at 61 inches.  Weight stated at 275 pounds.      Physical Exam  Neurological:      Mental Status: She is alert.   Psychiatric:         Attention and Perception: Attention and perception normal.         Mood and Affect: Mood normal.         Speech: Speech normal.         Behavior: Behavior is cooperative.         Thought Content: Thought content normal. Thought content is not paranoid or delusional. Thought content does not include homicidal or suicidal ideation. Thought content does not include homicidal or suicidal plan.         Cognition and Memory: Cognition and memory normal.      Comments: Restricted affect           Mental Status Exam:   Hygiene:   fair  Cooperation:  Cooperative  Eye Contact:  Fair  Psychomotor Behavior:  Appropriate  Affect:  Restricted  Mood: normal  Speech:  Normal  Thought Process:  Goal directed  Thought Content:  Normal  Suicidal:  None  Homicidal:  None  Hallucinations:  None  Delusion:  None  Memory:  Intact  Orientation:  Person, Place, Time and Situation  Reliability:  fair  Insight:  Fair  Judgement:  Fair  Impulse Control:  Fair  Physical/Medical Issues:  No          Previous Provider notes and available records reviewed by this APRN at today's encounter.         Lab Results:   No visits with results within 1 Month(s) from this visit.   Latest known visit with results is:   Admission on 03/27/2021, Discharged on 03/27/2021   Component Date Value Ref Range Status   • SARS-CoV-2 SCOTT 03/27/2021 Not Detected  Not Detected Final         Assessment/Plan   Problems Addressed this Visit     None      Visit Diagnoses     Bipolar I disorder, most recent episode depressed (CMS/HCC)  (Chronic)   -  Primary    Relevant Medications    buPROPion XL (Wellbutrin XL) 300 MG 24 hr tablet    busPIRone (BUSPAR) 10 MG tablet    Cariprazine HCl (Vraylar) 3 MG capsule capsule    Amphetamine-type substance use disorder, moderate (CMS/HCC)  (Chronic)        Sleep difficulties  (Chronic)       Generalized anxiety disorder  (Chronic)       Relevant Medications    buPROPion XL (Wellbutrin XL) 300 MG 24 hr tablet    busPIRone (BUSPAR) 10 MG tablet    Cariprazine HCl (Vraylar) 3 MG capsule capsule      Diagnoses       Codes Comments    Bipolar I disorder, most recent episode depressed (CMS/HCC)    -  Primary ICD-10-CM: F31.30  ICD-9-CM: 296.50     Amphetamine-type substance use disorder, moderate (CMS/HCC)     ICD-10-CM: F15.20  ICD-9-CM: 305.70     Sleep difficulties     ICD-10-CM: G47.9  ICD-9-CM: 780.50     Generalized anxiety disorder     ICD-10-CM: F41.1  ICD-9-CM: 300.02           Visit Diagnoses:    ICD-10-CM ICD-9-CM   1. Bipolar I disorder, most recent episode depressed (CMS/HCC)  F31.30 296.50   2. Amphetamine-type substance use disorder, moderate (CMS/HCC)  F15.20 305.70   3. Sleep difficulties  G47.9 780.50   4. Generalized anxiety disorder  F41.1 300.02          GOALS:  Short Term Goals: Patient will be compliant with medication, and patient will have no significant medication related side effects.  Patient will be engaged in psychotherapy as indicated.  Patient will report subjective improvement of symptoms.  Long term goals: To stabilize mood and treat/improve subjective symptoms, the patient will stay out of the hospital, the patient will be at an optimal level of functioning, and the patient will take all medications as prescribed.  The patient verbalized understanding and agreement with goals that were mutually set.      TREATMENT PLAN:   -Continue Trazodone 25-50 mg PO QHS PRN for sleep.   -Continue Buspar 10 mg PO BID for anxiety.   -Continue Wellbutrin  mg PO QAM for depression.   -Pt is prescribed Suboxone from another provider  -Increase Vraylar to 3 mg PO Daily for bipolar disorder    Medication and treatment options, both pharmacological and non-pharmacological treatment options, discussed during today's visit, including any off label use of  medication. Patient acknowledged and verbally consented with current treatment plan and was educated on the importance of compliance with treatment and follow-up appointments.        MEDICATION ISSUES:    Discussed treatment plan and medication options of prescribed medication as well as the risks, benefits, any black box warnings, and side effects including potential falls, possible impaired driving, and metabolic adversities among others, including any off label use of medication. Patient is agreeable to call the office with any worsening of symptoms or onset of side effects, or if any concerns or questions arise.  The contact information for the office is made available to the patient. Patient is agreeable to call 911 or go to the nearest ER should they begin having any SI/HI, or if any urgent concerns arise. No medication side effects or related complaints today.       MEDS ORDERED DURING VISIT:  New Medications Ordered This Visit   Medications   • buPROPion XL (Wellbutrin XL) 300 MG 24 hr tablet     Sig: Take 1 tablet by mouth Every Morning for 30 days.     Dispense:  30 tablet     Refill:  0   • busPIRone (BUSPAR) 10 MG tablet     Sig: Take 1 tablet PO BID     Dispense:  60 tablet     Refill:  0   • Cariprazine HCl (Vraylar) 3 MG capsule capsule     Sig: Take 1 capsule by mouth Daily for 30 days.     Dispense:  30 capsule     Refill:  0       Return in about 4 weeks (around 7/12/2021), or if symptoms worsen or fail to improve, for Recheck.     Treatment plan completed: 3/22/21    Progress toward goal: Not at goal    Functional Status: Severe impairment    Prognosis: Fair with Ongoing Treatment         This document has been electronically signed by JENNIFER Reed  June 14, 2021 15:57 EDT    Please note that portions of this note were completed with a voice recognition program. Efforts were made to edit dictation, but occasionally words are mistranscribed.

## 2021-07-12 ENCOUNTER — TELEMEDICINE (OUTPATIENT)
Dept: PSYCHIATRY | Facility: CLINIC | Age: 31
End: 2021-07-12

## 2021-07-12 DIAGNOSIS — F33.1 MODERATE EPISODE OF RECURRENT MAJOR DEPRESSIVE DISORDER (HCC): Primary | ICD-10-CM

## 2021-07-12 PROCEDURE — 90834 PSYTX W PT 45 MINUTES: CPT | Performed by: COUNSELOR

## 2021-07-12 NOTE — PROGRESS NOTES
Date: July 15, 2021  Time In: 10:53 AM  Time Out: 11:39 AM  This provider is located at the [facility] [address], [place] [address] using a secure [employer] Video Visit through [employer]. Patient is being seen remotely via telehealth at home address in [place] and stated they are in a secure environment for this session. The patient's condition being diagnosed/treated is appropriate for telemedicine. The provider identified herself as well as her credentials. The patient, and/or patients guardian, consent to be seen remotely, and when consent is given they understand that the consent allows for patient identifiable information to be sent to a third party as needed. They may refuse to be seen remotely at any time. The electronic data is encrypted and password protected, and the patient and/or guardian has been advised of the potential risks to privacy not withstanding such measures.     You have chosen to receive care through a telehealth visit.  Do you consent to use a video/audio connection for your medical care today? Yes    PROGRESS NOTE  Data:  Franca Ruiz is a 31 y.o. female who presents today for follow up. Patient states that her parents are getting a divorce and this has been going on for 6 weeks now. Patients states that although the woman is not her biological mother they have been together 27 years.  Patient states that the divorce is coming due to her father's inability to deal with his wife's mental health issues. Patient reports that her mother has a cycle of about 3 years in which she will have increased mental health issues and feels that her father is now actually seeing what this is like in his wife and is unable to deal with it. Patient states that she will be there for her parents as best as she can be but knows that she cannot return to her hometown as she often finds trouble there and is close to another anniversary of sobriety  and admits that maintaining sobriety is a daily struggle. Patient has broken up with her boyfriend and is worried about finances as she states that her job is not allowing overtime and she is not really making a livable wage and needs to find a new place to live as one of her roommates is pregnant and preparing for a baby. Patient states that not having a car and having to pay for rides to and from work daily is taxing on her and not allowing for saving for a car. Patient isn't able to find additional work based on not having a vehicle or the money to pay for a ride to another job and states that there is not many opportunities around her for work.     Chief Complaint: worry about family situations, housing and finances, feelings of boredom    History of Present Illness: patient has a history of substance abuse, anxiety and depression stemming from childhood      Clinical Maneuvering/Intervention: CBT to process current situations      Assisted patient in processing above session content; acknowledged and normalized patient’s thoughts, feelings, and concerns.  Rationalized patient thought process regarding her current stressors.  Discussed triggers associated with patient's feelings of boredom due to not having a car and limited positive social contacts.  Also discussed coping skills for patient to implement such as inquiring about working extra shifts, trying to find additional employment with her roommates, and thinking about hobbies that she would enjoy getting involved in.    Allowed patient to freely discuss issues without interruption or judgment. Provided safe, confidential environment to facilitate the development of positive therapeutic relationship and encourage open, honest communication. Assisted patient in identifying risk factors which would indicate the need for higher level of care including thoughts to harm self or others and/or self-harming behavior and encouraged patient to contact this office,  call 911, or present to the nearest emergency room should any of these events occur. Discussed crisis intervention services and means to access. Patient adamantly and convincingly denies current suicidal or homicidal ideation or perceptual disturbance.    Assessment:   Assessment   Patient appears to maintain relative stability as compared to their baseline.  However, patient continues to struggle with depression and anxiety and maintaining sobriety which continues to cause impairment in important areas of functioning.  A result, they can be reasonably expected to continue to benefit from treatment and would likely be at increased risk for decompensation otherwise.    Mental Status Exam:   Hygiene:   good  Cooperation:  Cooperative  Eye Contact:  Good  Psychomotor Behavior:  Appropriate  Affect:  Appropriate  Mood: fluctates  Speech:  Normal  Thought Process:  Circum  Thought Content:  Mood congruent  Suicidal:  None  Homicidal:  None  Hallucinations:  None  Delusion:  None  Memory:  Deficits  Orientation:  Person, Place, Time and Situation  Reliability:  good  Insight:  Good  Judgement:  Good  Impulse Control:  Fair  Physical/Medical Issues:  No        Patient's Support Network Includes:  parents and roomates, friends    Functional Status: Moderate impairment     Progress toward goal: Not at goal    Prognosis: Guarded with Ongoing Treatment         Plan:    Patient will continue in individual outpatient therapy with focus on improved functioning and coping skills, maintaining stability, and avoiding decompensation and the need for higher level of care.    Patient will adhere to medication regimen as prescribed and report any side effects. Patient will contact this office, call 911 or present to the nearest emergency room should suicidal or homicidal ideations occur. Provide Cognitive Behavioral Therapy and Solution Focused Therapy to improve functioning, maintain stability, and avoid decompensation and the need for  higher level of care.     Return in about 4 weeks, or earlier if symptoms worsen or fail to improve.           VISIT DIAGNOSIS:     ICD-10-CM ICD-9-CM   1. Moderate episode of recurrent major depressive disorder (CMS/[facility])  F33.1 296.32             This document has been electronically signed by DELVIN Osullivan  July 15, 2021 09:46 EDT      Part of this note may be an electronic transcription/translation of spoken language to printed text using the Dragon Dictation System.

## 2021-07-14 ENCOUNTER — TELEMEDICINE (OUTPATIENT)
Dept: PSYCHIATRY | Facility: CLINIC | Age: 31
End: 2021-07-14

## 2021-07-14 DIAGNOSIS — F41.1 GENERALIZED ANXIETY DISORDER: Chronic | ICD-10-CM

## 2021-07-14 DIAGNOSIS — F15.20: ICD-10-CM

## 2021-07-14 DIAGNOSIS — G47.9 SLEEP DIFFICULTIES: Chronic | ICD-10-CM

## 2021-07-14 DIAGNOSIS — F31.30 BIPOLAR I DISORDER, MOST RECENT EPISODE DEPRESSED (HCC): Primary | Chronic | ICD-10-CM

## 2021-07-14 PROCEDURE — 99214 OFFICE O/P EST MOD 30 MIN: CPT | Performed by: NURSE PRACTITIONER

## 2021-07-14 RX ORDER — BUPROPION HYDROCHLORIDE 300 MG/1
300 TABLET ORAL EVERY MORNING
Qty: 30 TABLET | Refills: 0 | Status: SHIPPED | OUTPATIENT
Start: 2021-07-14 | End: 2021-09-07 | Stop reason: SDUPTHER

## 2021-07-14 RX ORDER — BUSPIRONE HYDROCHLORIDE 10 MG/1
TABLET ORAL
Qty: 60 TABLET | Refills: 0 | Status: SHIPPED | OUTPATIENT
Start: 2021-07-14 | End: 2021-09-07 | Stop reason: SDUPTHER
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
## 2021-07-21 ENCOUNTER — OFFICE VISIT (OUTPATIENT)
Dept: SLEEP MEDICINE | Facility: HOSPITAL | Age: 31
End: 2021-07-21

## 2021-07-21 VITALS
DIASTOLIC BLOOD PRESSURE: 73 MMHG | WEIGHT: 293 LBS | HEART RATE: 95 BPM | BODY MASS INDEX: 55.32 KG/M2 | SYSTOLIC BLOOD PRESSURE: 128 MMHG | HEIGHT: 61 IN | OXYGEN SATURATION: 96 %

## 2021-07-21 DIAGNOSIS — R06.83 SNORING: Primary | ICD-10-CM

## 2021-07-21 DIAGNOSIS — G47.33 OBSTRUCTIVE SLEEP APNEA, ADULT: ICD-10-CM

## 2021-07-21 DIAGNOSIS — E66.01 MORBID (SEVERE) OBESITY DUE TO EXCESS CALORIES (HCC): ICD-10-CM

## 2021-07-21 PROCEDURE — 99203 OFFICE O/P NEW LOW 30 MIN: CPT | Performed by: INTERNAL MEDICINE

## 2021-07-21 NOTE — PATIENT INSTRUCTIONS

## 2021-08-21 ENCOUNTER — HOSPITAL ENCOUNTER (OUTPATIENT)
Dept: SLEEP MEDICINE | Facility: HOSPITAL | Age: 31
Discharge: HOME | End: 2021-08-21
Admitting: INTERNAL MEDICINE

## 2021-08-21 VITALS — BODY MASS INDEX: 55.32 KG/M2 | WEIGHT: 293 LBS | HEART RATE: 82 BPM | OXYGEN SATURATION: 97 % | HEIGHT: 61 IN

## 2021-08-21 DIAGNOSIS — R06.83 SNORING: ICD-10-CM

## 2021-08-21 DIAGNOSIS — G47.33 OBSTRUCTIVE SLEEP APNEA, ADULT: ICD-10-CM

## 2021-08-21 PROCEDURE — 95810 POLYSOM 6/> YRS 4/> PARAM: CPT

## 2021-08-21 PROCEDURE — 95810 POLYSOM 6/> YRS 4/> PARAM: CPT | Performed by: INTERNAL MEDICINE

## 2021-08-24 DIAGNOSIS — G47.33 MILD OBSTRUCTIVE SLEEP APNEA: Primary | ICD-10-CM

## 2021-08-24 NOTE — PROGRESS NOTES
CALLED PATIENT TO ADVISE OF STUDY. REACHED VM AND LEFT MESSAGE REQUESTING RETURN CALL 08/24/21 TRC

## 2021-08-25 ENCOUNTER — TELEPHONE (OUTPATIENT)
Dept: SLEEP MEDICINE | Facility: HOSPITAL | Age: 31
End: 2021-08-25

## 2021-09-07 DIAGNOSIS — F31.30 BIPOLAR I DISORDER, MOST RECENT EPISODE DEPRESSED (HCC): Chronic | ICD-10-CM

## 2021-09-07 DIAGNOSIS — F41.1 GENERALIZED ANXIETY DISORDER: Chronic | ICD-10-CM

## 2021-09-07 RX ORDER — BUSPIRONE HYDROCHLORIDE 10 MG/1
TABLET ORAL
Qty: 60 TABLET | Refills: 0 | Status: SHIPPED | OUTPATIENT
Start: 2021-09-07 | End: 2021-10-04 | Stop reason: SDUPTHER

## 2021-09-07 RX ORDER — BUPROPION HYDROCHLORIDE 300 MG/1
300 TABLET ORAL EVERY MORNING
Qty: 30 TABLET | Refills: 0 | Status: SHIPPED | OUTPATIENT
Start: 2021-09-07 | End: 2021-10-04 | Stop reason: SDUPTHER

## 2021-09-07 NOTE — TELEPHONE ENCOUNTER
Patient missed last appointment, she rescheduled for 10/4 however she will need refills before then.

## 2021-10-04 ENCOUNTER — TELEMEDICINE (OUTPATIENT)
Dept: PSYCHIATRY | Facility: CLINIC | Age: 31
End: 2021-10-04

## 2021-10-04 DIAGNOSIS — F41.1 GENERALIZED ANXIETY DISORDER: Chronic | ICD-10-CM

## 2021-10-04 DIAGNOSIS — F31.30 BIPOLAR I DISORDER, MOST RECENT EPISODE DEPRESSED (HCC): Primary | Chronic | ICD-10-CM

## 2021-10-04 DIAGNOSIS — F15.21: ICD-10-CM

## 2021-10-04 PROCEDURE — 99214 OFFICE O/P EST MOD 30 MIN: CPT | Performed by: NURSE PRACTITIONER

## 2021-10-04 RX ORDER — BUSPIRONE HYDROCHLORIDE 10 MG/1
TABLET ORAL
Qty: 60 TABLET | Refills: 0 | Status: SHIPPED | OUTPATIENT
Start: 2021-10-04 | End: 2021-11-02 | Stop reason: SDUPTHER

## 2021-10-04 RX ORDER — BUPROPION HYDROCHLORIDE 300 MG/1
300 TABLET ORAL EVERY MORNING
Qty: 30 TABLET | Refills: 0 | Status: SHIPPED | OUTPATIENT
Start: 2021-10-04 | End: 2021-11-02 | Stop reason: SDUPTHER

## 2021-10-04 NOTE — PROGRESS NOTES
This provider is located at the [facility] (through [facility]), [address], [place], [address] using a secure [employer] Video Visit through [employer]. Patient is being seen remotely via telehealth at their home address in [place], and stated they are in a secure environment for this session. The patient's condition being diagnosed/treated is appropriate for telemedicine. The provider identified herself as well as her credentials.   The patient, and/or patients guardian, consent to be seen remotely, and when consent is given they understand that the consent allows for patient identifiable information to be sent to a third party as needed.   They may refuse to be seen remotely at any time. The electronic data is encrypted and password protected, and the patient and/or guardian has been advised of the potential risks to privacy not withstanding such measures.    You have chosen to receive care through a telehealth visit.  Do you consent to use a video/audio connection for your medical care today? Yes        Subjective   Franca Ruiz is a 31 y.o. female who presents today for follow up    Chief Complaint:  Depression, anxiety, substance abuse    Accompanied by: Pt was alone for duration of appointment    History of Present Illness:   Pt has been doing fairly well since her last appointment. Pt has maintained sobriety since January 21st, 2021; she is in remission. Pt continues to work at [employer]. Pt was working first shift and is now working second shift; she rather work first shift but to work full-time she had to switch shifts. Pt recently change sober living houses ([facility]).  Pt is supposed to be getting a C-Pap machine soon; it is taking longer than usual due to a recent recall. Pt is currently sleeping 8-10 hours daily. She is unsure how much of it is restful sleep. Pt states her sleep study report showed she wakes up 10 times per hour. Appetite has decreased  since changing to second shift. She eats at least once a day. She guesses she has lost 5-10 lbs in the past few weeks. Mood has been fairly stable; she has only been taking the Wellbutrin XL and Buspar due to forgetting to request refill on the Vraylar. Pt currently feels that she is doing well without it thus far. The patient denies any new medical problems or changes in medications since last appointment with this facility. The patient reports compliance with current medication regimen. The patient denies any current side effects from her current medication regimen. The patient denies any abnormal muscle movements or tics. The patient rates their depression at a 2/10 on a 0-10 scale, with 10 being the worst. The patient rates their anxiety at a 2/10 on a 0-10 scale, with 10 being the worst. The patient would like to not adjust or change their medications at this visit. The patient denies any suicidal or homicidal ideations, plans, or intent at today's encounter and is convincing.  The patient denies any auditory hallucinations or visual hallucinations. The patient does not endorse any significant symptoms consistent with leidy or psychosis at today's encounter.          Prior Psychiatric Medications:  Wellbutrin XL: is helpful  Naltrexone: is helpful   Hydroxyzine: helps, but is sedating  Trazodone: takes PRN and is helpful  Buspar: unsure if effective  Depakote ER: may be beneficial  Paxil:   Zoloft  Celexa  Prozac: may have been helpful  Cymbalta: may have been helpful  Seroquel: side effects; too sedating  Abilify: sedating        The following portions of the patient's history were reviewed and updated as appropriate: allergies, current medications, past family history, past medical history, past social history, past surgical history and problem list.          Past Medical History:  Past Medical History:   Diagnosis Date   • Anxiety    • Arthritis    • Bilateral ovarian cysts    • Bipolar disorder (HCC)    •  Chronic pain    • Depression    • Sleep apnea        Social History:  Social History     Socioeconomic History   • Marital status: Single     Spouse name: Not on file   • Number of children: Not on file   • Years of education: Not on file   • Highest education level: Not on file   Tobacco Use   • Smoking status: Former Smoker   • Smokeless tobacco: Never Used   Substance and Sexual Activity   • Alcohol use: Never   • Drug use: Not Currently     Types: Benzodiazepines, Methamphetamines, MDMA (ecstacy), Oxycodone     Comment: January 21, 2021 (Last use)   • Sexual activity: Yes     Birth control/protection: Condom       Family History:  Family History   Problem Relation Age of Onset   • Mental illness Mother    • Arthritis Father    • Hypertension Father    • Mental illness Father    • Obesity Father    • Bipolar disorder Father    • Alcohol abuse Father    • Kidney disease Paternal Aunt    • Mental illness Paternal Aunt    • Bipolar disorder Paternal Aunt    • Mental illness Paternal Uncle    • Sleep apnea Other    • Asthma Other    • COPD Other    • Restless legs syndrome Other        Past Surgical History:  Past Surgical History:   Procedure Laterality Date   • ANKLE SURGERY Right    • WISDOM TOOTH EXTRACTION         Problem List:  Patient Active Problem List   Diagnosis   • Moderate episode of recurrent major depressive disorder (HCC)   • Mild obstructive sleep apnea       Allergy:   Allergies   Allergen Reactions   • Augmentin [Amoxicillin-Pot Clavulanate] Other (See Comments)     Throat blisters   • Crow Flavor [Flavoring Agent] Unknown - Low Severity   • Vantin [Cefpodoxime] Other (See Comments)     Throat blisters        Current Medications:   Current Outpatient Medications   Medication Sig Dispense Refill   • buprenorphine-naloxone (SUBOXONE) 8-2 MG per SL tablet      • buPROPion XL (Wellbutrin XL) 300 MG 24 hr tablet Take 1 tablet by mouth Every Morning for 30 days. 30 tablet 0   • busPIRone (BUSPAR) 10 MG  tablet Take 1 tablet PO BID 60 tablet 0     No current facility-administered medications for this visit.       Review of Symptoms:    Review of Systems   Constitutional: Positive for appetite change and fatigue.   Psychiatric/Behavioral: Negative.          Physical Exam:   Due to the remote nature of this encounter (virtual encounter), vitals were unable to be obtained.  Height stated at 61 inches.  Weight stated at 311 pounds.      Physical Exam  Neurological:      Mental Status: She is alert.   Psychiatric:         Attention and Perception: Attention and perception normal. She does not perceive auditory or visual hallucinations.         Mood and Affect: Mood normal.         Speech: Speech normal.         Behavior: Behavior is cooperative.         Thought Content: Thought content normal. Thought content is not paranoid or delusional. Thought content does not include homicidal or suicidal ideation. Thought content does not include homicidal or suicidal plan.         Cognition and Memory: Cognition and memory normal.      Comments: Restricted affect           Mental Status Exam:   Hygiene:   good  Cooperation:  Cooperative  Eye Contact:  Fair  Psychomotor Behavior:  Appropriate  Affect:  Restricted  Mood: normal  Speech:  Normal  Thought Process:  Linear  Thought Content:  Normal  Suicidal:  None  Homicidal:  None  Hallucinations:  None  Delusion:  None  Memory:  Intact  Orientation:  Person, Place, Time and Situation  Reliability:  fair  Insight:  Fair  Judgement:  Fair  Impulse Control:  Fair  Physical/Medical Issues:  No          Previous Provider notes and available records reviewed by this APRN at today's encounter.         Lab Results:   No visits with results within 1 Month(s) from this visit.   Latest known visit with results is:   Admission on 03/27/2021, Discharged on 03/27/2021   Component Date Value Ref Range Status   • SARS-CoV-2 SCOTT 03/27/2021 Not Detected  Not Detected Final         Assessment/Plan    Problems Addressed this Visit     None      Visit Diagnoses     Bipolar I disorder, most recent episode depressed (HCC)  (Chronic)   -  Primary    Relevant Medications    busPIRone (BUSPAR) 10 MG tablet    buPROPion XL (Wellbutrin XL) 300 MG 24 hr tablet    Generalized anxiety disorder  (Chronic)       Relevant Medications    busPIRone (BUSPAR) 10 MG tablet    buPROPion XL (Wellbutrin XL) 300 MG 24 hr tablet    Amphetamine-type substance use disorder, moderate, in early remission (HCC)          Diagnoses       Codes Comments    Bipolar I disorder, most recent episode depressed (HCC)    -  Primary ICD-10-CM: F31.30  ICD-9-CM: 296.50     Generalized anxiety disorder     ICD-10-CM: F41.1  ICD-9-CM: 300.02     Amphetamine-type substance use disorder, moderate, in early remission (HCC)     ICD-10-CM: F15.21  ICD-9-CM: 305.73           Visit Diagnoses:    ICD-10-CM ICD-9-CM   1. Bipolar I disorder, most recent episode depressed (HCC)  F31.30 296.50   2. Generalized anxiety disorder  F41.1 300.02   3. Amphetamine-type substance use disorder, moderate, in early remission (HCC)  F15.21 305.73          GOALS:  Short Term Goals: Patient will be compliant with medication, and patient will have no significant medication related side effects.  Patient will be engaged in psychotherapy as indicated.  Patient will report subjective improvement of symptoms.  Long term goals: To stabilize mood and treat/improve subjective symptoms, the patient will stay out of the hospital, the patient will be at an optimal level of functioning, and the patient will take all medications as prescribed.  The patient verbalized understanding and agreement with goals that were mutually set.      TREATMENT PLAN:   -Continue Buspar 10 mg PO BID for anxiety.   -Continue Wellbutrin  mg PO QAM for depression.   -Pt is prescribed Suboxone from another provider  -Discontinue Vraylar. Pt forgot to request refill and has been without it; pt currently feels  she is doing well and doesn't need it    Medication and treatment options, both pharmacological and non-pharmacological treatment options, discussed during today's visit, including any off label use of medication. Patient acknowledged and verbally consented with current treatment plan and was educated on the importance of compliance with treatment and follow-up appointments.        MEDICATION ISSUES:    Discussed treatment plan and medication options of prescribed medication as well as the risks, benefits, any black box warnings, and side effects including potential falls, possible impaired driving, and metabolic adversities among others, including any off label use of medication. Patient is agreeable to call the office with any worsening of symptoms or onset of side effects, or if any concerns or questions arise.  The contact information for the office is made available to the patient. Patient is agreeable to call 911 or go to the nearest ER should they begin having any SI/HI, or if any urgent concerns arise. No medication side effects or related complaints today.       MEDS ORDERED DURING VISIT:  New Medications Ordered This Visit   Medications   • busPIRone (BUSPAR) 10 MG tablet     Sig: Take 1 tablet PO BID     Dispense:  60 tablet     Refill:  0   • buPROPion XL (Wellbutrin XL) 300 MG 24 hr tablet     Sig: Take 1 tablet by mouth Every Morning for 30 days.     Dispense:  30 tablet     Refill:  0       Return in about 4 weeks (around 11/1/2021), or if symptoms worsen or fail to improve, for Recheck.     Treatment plan completed: 3/22/21    Progress toward goal: Not at goal    Functional Status: Severe impairment    Prognosis: Fair with Ongoing Treatment         This document has been electronically signed by JENNIFER Reed  October 4, 2021 11:19 EDT    Please note that portions of this note were completed with a voice recognition program. Efforts were made to edit dictation, but occasionally words are  mistranscribed.

## 2021-10-11 PROCEDURE — U0004 COV-19 TEST NON-CDC HGH THRU: HCPCS | Performed by: FAMILY MEDICINE

## 2021-11-02 ENCOUNTER — TELEMEDICINE (OUTPATIENT)
Dept: PSYCHIATRY | Facility: CLINIC | Age: 31
End: 2021-11-02

## 2021-11-02 DIAGNOSIS — F15.21: ICD-10-CM

## 2021-11-02 DIAGNOSIS — F41.1 GENERALIZED ANXIETY DISORDER: Chronic | ICD-10-CM

## 2021-11-02 DIAGNOSIS — F31.30 BIPOLAR I DISORDER, MOST RECENT EPISODE DEPRESSED (HCC): Primary | Chronic | ICD-10-CM

## 2021-11-02 PROCEDURE — 99214 OFFICE O/P EST MOD 30 MIN: CPT | Performed by: NURSE PRACTITIONER

## 2021-11-02 RX ORDER — BUSPIRONE HYDROCHLORIDE 10 MG/1
TABLET ORAL
Qty: 60 TABLET | Refills: 1 | Status: SHIPPED | OUTPATIENT
Start: 2021-11-02 | End: 2021-12-29 | Stop reason: SDUPTHER

## 2021-11-02 RX ORDER — BUPROPION HYDROCHLORIDE 300 MG/1
300 TABLET ORAL EVERY MORNING
Qty: 30 TABLET | Refills: 1 | Status: SHIPPED | OUTPATIENT
Start: 2021-11-02 | End: 2021-12-29 | Stop reason: SDUPTHER

## 2021-11-02 NOTE — PROGRESS NOTES
"This provider is located at the [facility] (through [facility]), [address], [place], [address] using a secure [employer] Video Visit through [employer]. Patient is being seen remotely via telehealth at their home address in [place], and stated they are in a secure environment for this session. The patient's condition being diagnosed/treated is appropriate for telemedicine. The provider identified herself as well as her credentials.   The patient, and/or patients guardian, consent to be seen remotely, and when consent is given they understand that the consent allows for patient identifiable information to be sent to a third party as needed.   They may refuse to be seen remotely at any time. The electronic data is encrypted and password protected, and the patient and/or guardian has been advised of the potential risks to privacy not withstanding such measures.    You have chosen to receive care through a telehealth visit.  Do you consent to use a video/audio connection for your medical care today? Yes        Subjective   Franca Ruiz is a 31 y.o. female who presents today for follow up    Chief Complaint:  Depression, anxiety, substance abuse    Accompanied by: Pt's roommate is present and pt verbalizes consent for her to be present    History of Present Illness:   Pt states that she is doing \"alright\". Pt is currently only taking the Buspar and Wellbutrin XL. Pt goes into work at 3 PM today; she works second shift. She has been getting along with others. Pt still hasn't received her CPAP, she may have to wait another 45 days. Pt is sleeping well \"for the most part\". On November 23rd, pt will be 10 months sober. Appetite has been \"off\" since she went to second shift at work. Appetite has decreased and she has lost 10-15 lbs. Denies depression. Pt admits to mild anxiety due to her uncle and aunt passing away from COVID-19 one week apart. The patient denies any new medical " problems or changes in medications since last appointment with this facility. The patient reports compliance with current medication regimen. The patient denies any current side effects from her current medication regimen. The patient would like to not adjust or change their medications at this visit. The patient denies any suicidal or homicidal ideations, plans, or intent at today's encounter and is convincing.  The patient denies any auditory hallucinations or visual hallucinations. The patient does not endorse any significant symptoms consistent with leidy or psychosis at today's encounter.        Prior Psychiatric Medications:  Wellbutrin XL: is helpful  Naltrexone: is helpful   Hydroxyzine: helps, but is sedating  Trazodone: takes PRN and is helpful  Buspar: unsure if effective  Depakote ER: may be beneficial  Paxil:   Zoloft  Celexa  Prozac: may have been helpful  Cymbalta: may have been helpful  Seroquel: side effects; too sedating  Abilify: sedating        The following portions of the patient's history were reviewed and updated as appropriate: allergies, current medications, past family history, past medical history, past social history, past surgical history and problem list.          Past Medical History:  Past Medical History:   Diagnosis Date   • Anxiety    • Arthritis    • Bilateral ovarian cysts    • Bipolar disorder (HCC)    • Chronic pain    • Depression    • Sleep apnea        Social History:  Social History     Socioeconomic History   • Marital status: Single   Tobacco Use   • Smoking status: Former Smoker   • Smokeless tobacco: Never Used   Substance and Sexual Activity   • Alcohol use: Never   • Drug use: Not Currently     Types: Benzodiazepines, Methamphetamines, MDMA (ecstacy), Oxycodone     Comment: January 21, 2021 (Last use)   • Sexual activity: Yes     Birth control/protection: Condom       Family History:  Family History   Problem Relation Age of Onset   • Mental illness Mother    •  Arthritis Father    • Hypertension Father    • Mental illness Father    • Obesity Father    • Bipolar disorder Father    • Alcohol abuse Father    • Kidney disease Paternal Aunt    • Mental illness Paternal Aunt    • Bipolar disorder Paternal Aunt    • Mental illness Paternal Uncle    • Sleep apnea Other    • Asthma Other    • COPD Other    • Restless legs syndrome Other        Past Surgical History:  Past Surgical History:   Procedure Laterality Date   • ANKLE SURGERY Right    • WISDOM TOOTH EXTRACTION         Problem List:  Patient Active Problem List   Diagnosis   • Moderate episode of recurrent major depressive disorder (HCC)   • Mild obstructive sleep apnea       Allergy:   Allergies   Allergen Reactions   • Augmentin [Amoxicillin-Pot Clavulanate] Other (See Comments)     Throat blisters   • [place]it Flavor [Flavoring Agent] Unknown - Low Severity   • Vantin [Cefpodoxime] Other (See Comments)     Throat blisters        Current Medications:   Current Outpatient Medications   Medication Sig Dispense Refill   • buprenorphine-naloxone (SUBOXONE) 8-2 MG per SL tablet      • buPROPion XL (Wellbutrin XL) 300 MG 24 hr tablet Take 1 tablet by mouth Every Morning for 30 days. 30 tablet 1   • busPIRone (BUSPAR) 10 MG tablet Take 1 tablet PO BID 60 tablet 1     No current facility-administered medications for this visit.       Review of Symptoms:    Review of Systems   Constitutional: Positive for appetite change and unexpected weight loss.   Psychiatric/Behavioral: The patient is nervous/anxious.          Physical Exam:   Due to the remote nature of this encounter (virtual encounter), vitals were unable to be obtained.  Height stated at 61 inches.  Weight stated at 260-265 pounds.      Physical Exam  Neurological:      Mental Status: She is alert.   Psychiatric:         Attention and Perception: Attention and perception normal. She does not perceive auditory or visual hallucinations.         Mood and Affect: Mood and affect  normal.         Speech: Speech normal.         Behavior: Behavior is cooperative.         Thought Content: Thought content normal. Thought content is not paranoid or delusional. Thought content does not include homicidal or suicidal ideation. Thought content does not include homicidal or suicidal plan.         Cognition and Memory: Cognition and memory normal.           Mental Status Exam:   Hygiene:   good  Cooperation:  Cooperative  Eye Contact:  Fair  Psychomotor Behavior:  Appropriate  Affect:  Full range  Mood: euthymic  Speech:  Normal  Thought Process:  Linear  Thought Content:  Normal  Suicidal:  None  Homicidal:  None  Hallucinations:  None  Delusion:  None  Memory:  Intact  Orientation:  Person, Place, Time and Situation  Reliability:  good  Insight:  Good  Judgement:  Good  Impulse Control:  Fair  Physical/Medical Issues:  No          Previous Provider notes and available records reviewed by this APRN at today's encounter.         Lab Results:   Admission on 10/11/2021, Discharged on 10/11/2021   Component Date Value Ref Range Status   • SARS-CoV-2 SCOTT 10/11/2021 Not Detected  Not Detected Final         Assessment/Plan   Problems Addressed this Visit     None      Visit Diagnoses     Bipolar I disorder, most recent episode depressed (HCC)  (Chronic)   -  Primary    Relevant Medications    buPROPion XL (Wellbutrin XL) 300 MG 24 hr tablet    busPIRone (BUSPAR) 10 MG tablet    Generalized anxiety disorder  (Chronic)       Relevant Medications    buPROPion XL (Wellbutrin XL) 300 MG 24 hr tablet    busPIRone (BUSPAR) 10 MG tablet    Amphetamine-type substance use disorder, moderate, in early remission (HCC)          Diagnoses       Codes Comments    Bipolar I disorder, most recent episode depressed (HCC)    -  Primary ICD-10-CM: F31.30  ICD-9-CM: 296.50     Generalized anxiety disorder     ICD-10-CM: F41.1  ICD-9-CM: 300.02     Amphetamine-type substance use disorder, moderate, in early remission (HCC)      ICD-10-CM: F15.21  ICD-9-CM: 305.73           Visit Diagnoses:    ICD-10-CM ICD-9-CM   1. Bipolar I disorder, most recent episode depressed (HCC)  F31.30 296.50   2. Generalized anxiety disorder  F41.1 300.02   3. Amphetamine-type substance use disorder, moderate, in early remission ([facility])  F15.21 305.73          GOALS:  Short Term Goals: Patient will be compliant with medication, and patient will have no significant medication related side effects.  Patient will be engaged in psychotherapy as indicated.  Patient will report subjective improvement of symptoms.  Long term goals: To stabilize mood and treat/improve subjective symptoms, the patient will stay out of the hospital, the patient will be at an optimal level of functioning, and the patient will take all medications as prescribed.  The patient verbalized understanding and agreement with goals that were mutually set.      TREATMENT PLAN:   -Continue Buspar 10 mg PO BID for anxiety.   -Continue Wellbutrin  mg PO QAM for depression.   -Pt is prescribed Suboxone from another provider    Medication and treatment options, both pharmacological and non-pharmacological treatment options, discussed during today's visit, including any off label use of medication. Patient acknowledged and verbally consented with current treatment plan and was educated on the importance of compliance with treatment and follow-up appointments.        MEDICATION ISSUES:    Discussed treatment plan and medication options of prescribed medication as well as the risks, benefits, any black box warnings, and side effects including potential falls, possible impaired driving, and metabolic adversities among others, including any off label use of medication. Patient is agreeable to call the office with any worsening of symptoms or onset of side effects, or if any concerns or questions arise.  The contact information for the office is made available to the patient. Patient is agreeable to call  911 or go to the nearest ER should they begin having any SI/HI, or if any urgent concerns arise. No medication side effects or related complaints today.       MEDS ORDERED DURING VISIT:  New Medications Ordered This Visit   Medications   • buPROPion XL (Wellbutrin XL) 300 MG 24 hr tablet     Sig: Take 1 tablet by mouth Every Morning for 30 days.     Dispense:  30 tablet     Refill:  1   • busPIRone (BUSPAR) 10 MG tablet     Sig: Take 1 tablet PO BID     Dispense:  60 tablet     Refill:  1       Return in about 8 weeks (around 12/28/2021), or if symptoms worsen or fail to improve, for Recheck.     Treatment plan completed: 3/22/21    Progress toward goal: Not at goal    Functional Status: Severe impairment    Prognosis: Fair with Ongoing Treatment         This document has been electronically signed by JENNIFER Reed  November 2, 2021 12:47 EDT    Please note that portions of this note were completed with a voice recognition program. Efforts were made to edit dictation, but occasionally words are mistranscribed.

## 2021-11-08 ENCOUNTER — LAB (OUTPATIENT)
Dept: LAB | Facility: HOSPITAL | Age: 31
End: 2021-11-08

## 2021-11-08 DIAGNOSIS — R53.83 FATIGUE, UNSPECIFIED TYPE: ICD-10-CM

## 2021-11-08 LAB
25(OH)D3 SERPL-MCNC: 38.6 NG/ML
ALBUMIN SERPL-MCNC: 4.2 G/DL (ref 3.5–5.2)
ALBUMIN/GLOB SERPL: 1.5 G/DL
ALP SERPL-CCNC: 86 U/L (ref 39–117)
ALT SERPL W P-5'-P-CCNC: 19 U/L (ref 1–33)
ANION GAP SERPL CALCULATED.3IONS-SCNC: 10.7 MMOL/L (ref 5–15)
AST SERPL-CCNC: 21 U/L (ref 1–32)
BASOPHILS # BLD AUTO: 0.04 10*3/MM3 (ref 0–0.2)
BASOPHILS NFR BLD AUTO: 0.6 % (ref 0–1.5)
BILIRUB SERPL-MCNC: 0.3 MG/DL (ref 0–1.2)
BUN SERPL-MCNC: 10 MG/DL (ref 6–20)
BUN/CREAT SERPL: 12 (ref 7–25)
CALCIUM SPEC-SCNC: 9.4 MG/DL (ref 8.6–10.5)
CHLORIDE SERPL-SCNC: 105 MMOL/L (ref 98–107)
CO2 SERPL-SCNC: 23.3 MMOL/L (ref 22–29)
CREAT SERPL-MCNC: 0.83 MG/DL (ref 0.57–1)
DEPRECATED RDW RBC AUTO: 40.7 FL (ref 37–54)
EOSINOPHIL # BLD AUTO: 0.34 10*3/MM3 (ref 0–0.4)
EOSINOPHIL NFR BLD AUTO: 4.9 % (ref 0.3–6.2)
ERYTHROCYTE [DISTWIDTH] IN BLOOD BY AUTOMATED COUNT: 12.5 % (ref 12.3–15.4)
GFR SERPL CREATININE-BSD FRML MDRD: 80 ML/MIN/1.73
GLOBULIN UR ELPH-MCNC: 2.8 GM/DL
GLUCOSE SERPL-MCNC: 90 MG/DL (ref 65–99)
HCT VFR BLD AUTO: 42.2 % (ref 34–46.6)
HGB BLD-MCNC: 13.8 G/DL (ref 12–15.9)
IMM GRANULOCYTES # BLD AUTO: 0.02 10*3/MM3 (ref 0–0.05)
IMM GRANULOCYTES NFR BLD AUTO: 0.3 % (ref 0–0.5)
LYMPHOCYTES # BLD AUTO: 2.31 10*3/MM3 (ref 0.7–3.1)
LYMPHOCYTES NFR BLD AUTO: 33.4 % (ref 19.6–45.3)
MCH RBC QN AUTO: 29.1 PG (ref 26.6–33)
MCHC RBC AUTO-ENTMCNC: 32.7 G/DL (ref 31.5–35.7)
MCV RBC AUTO: 88.8 FL (ref 79–97)
MONOCYTES # BLD AUTO: 0.63 10*3/MM3 (ref 0.1–0.9)
MONOCYTES NFR BLD AUTO: 9.1 % (ref 5–12)
NEUTROPHILS NFR BLD AUTO: 3.57 10*3/MM3 (ref 1.7–7)
NEUTROPHILS NFR BLD AUTO: 51.7 % (ref 42.7–76)
NRBC BLD AUTO-RTO: 0 /100 WBC (ref 0–0.2)
PLATELET # BLD AUTO: 258 10*3/MM3 (ref 140–450)
PMV BLD AUTO: 10.5 FL (ref 6–12)
POTASSIUM SERPL-SCNC: 4 MMOL/L (ref 3.5–5.2)
PROT SERPL-MCNC: 7 G/DL (ref 6–8.5)
RBC # BLD AUTO: 4.75 10*6/MM3 (ref 3.77–5.28)
SODIUM SERPL-SCNC: 139 MMOL/L (ref 136–145)
TSH SERPL DL<=0.05 MIU/L-ACNC: 2.37 UIU/ML (ref 0.27–4.2)
VIT B12 BLD-MCNC: 339 PG/ML (ref 211–946)
WBC # BLD AUTO: 6.91 10*3/MM3 (ref 3.4–10.8)

## 2021-11-08 PROCEDURE — 82306 VITAMIN D 25 HYDROXY: CPT

## 2021-11-08 PROCEDURE — 82607 VITAMIN B-12: CPT

## 2021-11-08 PROCEDURE — 80050 GENERAL HEALTH PANEL: CPT

## 2021-11-23 ENCOUNTER — OFFICE VISIT (OUTPATIENT)
Dept: FAMILY MEDICINE CLINIC | Facility: CLINIC | Age: 31
End: 2021-11-23

## 2021-11-23 VITALS
HEART RATE: 78 BPM | SYSTOLIC BLOOD PRESSURE: 120 MMHG | TEMPERATURE: 97.8 F | BODY MASS INDEX: 55.32 KG/M2 | HEIGHT: 61 IN | OXYGEN SATURATION: 98 % | WEIGHT: 293 LBS | DIASTOLIC BLOOD PRESSURE: 84 MMHG

## 2021-11-23 DIAGNOSIS — Z00.00 ANNUAL PHYSICAL EXAM: Primary | ICD-10-CM

## 2021-11-23 DIAGNOSIS — G56.03 BILATERAL CARPAL TUNNEL SYNDROME: ICD-10-CM

## 2021-11-23 DIAGNOSIS — G47.33 MILD OBSTRUCTIVE SLEEP APNEA: ICD-10-CM

## 2021-11-23 DIAGNOSIS — Z12.4 PAPANICOLAOU SMEAR: ICD-10-CM

## 2021-11-23 DIAGNOSIS — Z23 NEED FOR IMMUNIZATION AGAINST INFLUENZA: ICD-10-CM

## 2021-11-23 DIAGNOSIS — F33.1 MODERATE EPISODE OF RECURRENT MAJOR DEPRESSIVE DISORDER (HCC): ICD-10-CM

## 2021-11-23 DIAGNOSIS — E66.01 MORBID OBESITY WITH BMI OF 50.0-59.9, ADULT (HCC): ICD-10-CM

## 2021-11-23 PROCEDURE — 90686 IIV4 VACC NO PRSV 0.5 ML IM: CPT | Performed by: NURSE PRACTITIONER

## 2021-11-23 PROCEDURE — 3008F BODY MASS INDEX DOCD: CPT | Performed by: NURSE PRACTITIONER

## 2021-11-23 PROCEDURE — 99395 PREV VISIT EST AGE 18-39: CPT | Performed by: NURSE PRACTITIONER

## 2021-11-23 PROCEDURE — 90471 IMMUNIZATION ADMIN: CPT | Performed by: NURSE PRACTITIONER

## 2021-11-23 PROCEDURE — 2014F MENTAL STATUS ASSESS: CPT | Performed by: NURSE PRACTITIONER

## 2021-11-23 NOTE — PROGRESS NOTES
Franca Ruiz presents today for a physical    Chief Complaint   Patient presents with   • Annual Exam     Pt would like to have a pap done today.   • Numbness     Pt states both arms have been going to sleep on her. She states it happens multiple times a day.        HPI   Answers for HPI/ROS submitted by the patient on 11/17/2021  What is the primary reason for your visit?: Physical    She has been having numbness in her arms for several years.  She diagnosed with carpal tunnel previously and was supposed to have surgery, but wasn't able to.  She has had braces previously that were helpful.  She wore them at night.  She will notice it when she is driving, sitting, or doing nothing.  It will feel like a rubber band has been cut loose.  When she moves her arms around it gets better.  Her first 3 fingers are typically affected.  She did have an EMG performed and she thinks it was done in [facility].      She is working at [employer].  She does like her job.      Her last pap was 5-6 years ago.  It was normal.  She denies any pelvic issues.  She is not currently sexually active.  She does not want to be STD tested today.      She does check her breasts and denies any concerning findings.    She has been trying to eat healthy.  She has been exercising.  She has been going to the gym with her friend once a week.      She does check her skin regularly.  She does wear sunscreen.      She takes Suboxone and goes to [facility].  She feels like things are going well.  She is living in [facility] which is sober living.  Her last time using was 1/21/21.      She goes to psychiatry for her mood issues.  She feels like her depression is well controlled.  She did have a rough day today as one of her roommates overdosed and she had to perform CPR on her and give her Narcan.  Her roommate did end up surviving.    She got her CPAP machine this past month.  It had take 3 months to get it.  She feels like she is sleeping good  with it.      She is an ex-smoker.  She does vape.      She does not have a family history of cancer.       Patient Active Problem List   Diagnosis   • Moderate episode of recurrent major depressive disorder (HCC)   • Mild obstructive sleep apnea   • Bilateral carpal tunnel syndrome       Current Outpatient Medications   Medication Sig Dispense Refill   • buprenorphine-naloxone (SUBOXONE) 8-2 MG per SL tablet      • buPROPion XL (Wellbutrin XL) 300 MG 24 hr tablet Take 1 tablet by mouth Every Morning for 30 days. 30 tablet 1   • busPIRone (BUSPAR) 10 MG tablet Take 1 tablet PO BID 60 tablet 1     No current facility-administered medications for this visit.       Allergies   Allergen Reactions   • Augmentin [Amoxicillin-Pot Clavulanate] Other (See Comments)     Throat blisters   • [place] Flavor [Flavoring Agent] Unknown - Low Severity   • Vantin [Cefpodoxime] Other (See Comments)     Throat blisters        Past Medical History:   Diagnosis Date   • Anxiety    • Arthritis    • Bilateral ovarian cysts    • Bipolar disorder (HCC)    • Chronic pain    • Depression    • Obesity    • Sleep apnea    • Substance abuse (HCC)         Past Surgical History:   Procedure Laterality Date   • ANKLE SURGERY Right    • WISDOM TOOTH EXTRACTION          Family History   Problem Relation Age of Onset   • Mental illness Mother    • Arthritis Father    • Hypertension Father    • Mental illness Father    • Obesity Father    • Bipolar disorder Father    • Alcohol abuse Father    • Kidney disease Paternal Aunt    • Mental illness Paternal Aunt    • Bipolar disorder Paternal Aunt    • Mental illness Paternal Uncle    • Sleep apnea Other    • Asthma Other    • COPD Other    • Restless legs syndrome Other         Social History     Socioeconomic History   • Marital status: Single   Tobacco Use   • Smoking status: Former Smoker     Packs/day: 1.00     Years: 10.00     Pack years: 10.00     Types: Cigarettes     Start date: 1/1/2020     Quit date:  "2020     Years since quittin.8   • Smokeless tobacco: Never Used   Vaping Use   • Vaping Use: Every day   Substance and Sexual Activity   • Alcohol use: Never   • Drug use: Not Currently     Types: Benzodiazepines, Hydrocodone, Marijuana, MDMA (ecstacy), Methamphetamines, Morphine, Oxycodone     Comment: 2021 (Last use)   • Sexual activity: Yes     Partners: Male     Birth control/protection: Condom        Vitals:    21 1309   BP: 120/84   Pulse: 78   Temp: 97.8 °F (36.6 °C)   SpO2: 98%   Weight: 135 kg (297 lb)   Height: 154.9 cm (61\")   PainSc: 0-No pain      Body mass index is 56.12 kg/m².    Patient's Body mass index is 56.12 kg/m². indicating that she is morbidly obese (BMI > 40 or > 35 with obesity - related health condition). Obesity-related health conditions include the following: obstructive sleep apnea. Obesity is improving with lifestyle modifications. BMI is is above average; BMI management plan is completed. We discussed portion control and increasing exercise..      Physical Exam  Vitals reviewed. Exam conducted with a chaperone present (Alice Kent MA).   Constitutional:       Appearance: Normal appearance. She is well-developed. She is obese.   HENT:      Head: Normocephalic and atraumatic.      Right Ear: Tympanic membrane, ear canal and external ear normal.      Left Ear: Tympanic membrane, ear canal and external ear normal.      Nose: Nose normal.      Mouth/Throat:      Mouth: Mucous membranes are moist.      Pharynx: Oropharynx is clear.   Eyes:      General: Lids are normal. Lids are everted, no foreign bodies appreciated.      Extraocular Movements: Extraocular movements intact.      Conjunctiva/sclera: Conjunctivae normal.      Pupils: Pupils are equal, round, and reactive to light.   Neck:      Thyroid: No thyroid mass or thyromegaly.      Vascular: No carotid bruit.      Trachea: Trachea normal.   Cardiovascular:      Rate and Rhythm: Normal rate and regular " rhythm.      Pulses: Normal pulses.      Heart sounds: Normal heart sounds.   Pulmonary:      Effort: Pulmonary effort is normal.      Breath sounds: Normal breath sounds.   Abdominal:      General: Bowel sounds are normal.      Palpations: Abdomen is soft.      Tenderness: There is no abdominal tenderness. There is no guarding or rebound.      Hernia: There is no hernia in the left inguinal area or right inguinal area.   Genitourinary:     Exam position: Lithotomy position.      Jesus stage (genital): 5.      Vagina: Normal.      Cervix: Normal.      Uterus: Normal.       Adnexa: Right adnexa normal.      Rectum: Normal.   Musculoskeletal:         General: Normal range of motion.      Cervical back: Normal range of motion and neck supple.      Comments: Strength 5/5 to BUE and BLE.  Tinel and Phalen positive bilaterally.   Lymphadenopathy:      Lower Body: No right inguinal adenopathy. No left inguinal adenopathy.   Skin:     General: Skin is warm and dry.   Neurological:      General: No focal deficit present.      Mental Status: She is alert and oriented to person, place, and time.      Deep Tendon Reflexes: Reflexes are normal and symmetric.      Comments: Romberg negative.  DTRs +2.     Psychiatric:         Mood and Affect: Mood normal.         Behavior: Behavior normal.         Thought Content: Thought content normal.         Judgment: Judgment normal.        Results for orders placed or performed in visit on 11/08/21   Comprehensive Metabolic Panel    Specimen: Blood   Result Value Ref Range    Glucose 90 65 - 99 mg/dL    BUN 10 6 - 20 mg/dL    Creatinine 0.83 0.57 - 1.00 mg/dL    Sodium 139 136 - 145 mmol/L    Potassium 4.0 3.5 - 5.2 mmol/L    Chloride 105 98 - 107 mmol/L    CO2 23.3 22.0 - 29.0 mmol/L    Calcium 9.4 8.6 - 10.5 mg/dL    Total Protein 7.0 6.0 - 8.5 g/dL    Albumin 4.20 3.50 - 5.20 g/dL    ALT (SGPT) 19 1 - 33 U/L    AST (SGOT) 21 1 - 32 U/L    Alkaline Phosphatase 86 39 - 117 U/L    Total  Bilirubin 0.3 0.0 - 1.2 mg/dL    eGFR Non African Amer 80 >60 mL/min/1.73    Globulin 2.8 gm/dL    A/G Ratio 1.5 g/dL    BUN/Creatinine Ratio 12.0 7.0 - 25.0    Anion Gap 10.7 5.0 - 15.0 mmol/L   TSH Rfx On Abnormal To Free T4    Specimen: Blood   Result Value Ref Range    TSH 2.370 0.270 - 4.200 uIU/mL   Vitamin D 25 Hydroxy    Specimen: Blood   Result Value Ref Range    25 Hydroxy, Vitamin D 38.6 ng/ml   Vitamin B12    Specimen: Blood   Result Value Ref Range    Vitamin B-12 339 211 - 946 pg/mL   CBC Auto Differential    Specimen: Blood   Result Value Ref Range    WBC 6.91 3.40 - 10.80 10*3/mm3    RBC 4.75 3.77 - 5.28 10*6/mm3    Hemoglobin 13.8 12.0 - 15.9 g/dL    Hematocrit 42.2 34.0 - 46.6 %    MCV 88.8 79.0 - 97.0 fL    MCH 29.1 26.6 - 33.0 pg    MCHC 32.7 31.5 - 35.7 g/dL    RDW 12.5 12.3 - 15.4 %    RDW-SD 40.7 37.0 - 54.0 fl    MPV 10.5 6.0 - 12.0 fL    Platelets 258 140 - 450 10*3/mm3    Neutrophil % 51.7 42.7 - 76.0 %    Lymphocyte % 33.4 19.6 - 45.3 %    Monocyte % 9.1 5.0 - 12.0 %    Eosinophil % 4.9 0.3 - 6.2 %    Basophil % 0.6 0.0 - 1.5 %    Immature Grans % 0.3 0.0 - 0.5 %    Neutrophils, Absolute 3.57 1.70 - 7.00 10*3/mm3    Lymphocytes, Absolute 2.31 0.70 - 3.10 10*3/mm3    Monocytes, Absolute 0.63 0.10 - 0.90 10*3/mm3    Eosinophils, Absolute 0.34 0.00 - 0.40 10*3/mm3    Basophils, Absolute 0.04 0.00 - 0.20 10*3/mm3    Immature Grans, Absolute 0.02 0.00 - 0.05 10*3/mm3    nRBC 0.0 0.0 - 0.2 /100 WBC        Immunization History   Administered Date(s) Administered   • COVID-19 (MODERNA) 1st, 2nd, 3rd Dose Only 04/26/2021, 06/04/2021   • FluLaval/Fluarix/Fluzone >6 12/01/2020, 11/23/2021   • Hep B, Adolescent or Pediatric 11/20/2000, 01/19/2001, 07/23/2001   • Influenza Quad Vaccine (Inpatient) 01/02/2016, 12/01/2017   • MMR 11/20/2000   • Meningococcal MCV4P (Menactra) 08/03/2011   • Td 08/06/2003   • Tdap 08/03/2011, 08/29/2020       Health Maintenance   Topic Date Due   • PAP SMEAR  11/23/2021  (Originally 12/1/2020)   • TDAP/TD VACCINES (4 - Td or Tdap) 08/29/2030   • INFLUENZA VACCINE  Completed       Diagnoses and all orders for this visit:    1. Annual physical exam (Primary) -exam unremarkable.    2. Need for immunization against influenza  -     Flulaval/Fluarix/Fluzone >6 Months ([id number])    3. Papanicolaou smear -exam unremarkable.  Discussed with patient the results will be back in around 5 days.    4. Bilateral carpal tunnel syndrome -patient has been diagnosed in the past, but has not had testing performed in several years.  She does feel like her symptoms are worsening.  --Encourage patient to buy braces at [facility] that she will wear at bedtime.  --Referral placed for EMG.  --Referral to orthopedic surgery.  -     EMG & Nerve Conduction Test; Future  -     Ambulatory Referral to Orthopedic Surgery    5. Mild obstructive sleep apnea -patient just got her CPAP this month.  She states she is sleeping well with it.    6. Moderate episode of recurrent major depressive disorder (HCC) -stable.  Continue with psychiatry appointments.    7.  Morbid obesity with BMI of 50-59.9 -patient has been losing weight by staying active at work.  Encouraged patient to continue with healthy eating and routine physical activity.    Counseled on health maintenance topics and preventative care recommendations: cervical cancer screening, fasting labs, healthy eating (avoid sweets and excessive intake of carbs), routine physical activity (150 minutes/week of moderate intensity exercise), monthly skin exams, sunscreen use, eye exams, dental exams.     Return in about 1 year (around 11/23/2022) for Annual physical with Dr. Potts.    JENNIFER oRgel

## 2021-12-01 PROCEDURE — U0004 COV-19 TEST NON-CDC HGH THRU: HCPCS | Performed by: FAMILY MEDICINE

## 2021-12-03 ENCOUNTER — TELEPHONE (OUTPATIENT)
Dept: URGENT CARE | Facility: CLINIC | Age: 31
End: 2021-12-03

## 2021-12-03 NOTE — TELEPHONE ENCOUNTER
12/03/2021 called and verified birth date and informed her that her covid test was negative.  She said she is feeling better.  anthony

## 2021-12-29 ENCOUNTER — TELEMEDICINE (OUTPATIENT)
Dept: PSYCHIATRY | Facility: CLINIC | Age: 31
End: 2021-12-29

## 2021-12-29 DIAGNOSIS — F31.30 BIPOLAR I DISORDER, MOST RECENT EPISODE DEPRESSED (HCC): Primary | Chronic | ICD-10-CM

## 2021-12-29 DIAGNOSIS — F15.21: ICD-10-CM

## 2021-12-29 DIAGNOSIS — F41.1 GENERALIZED ANXIETY DISORDER: Chronic | ICD-10-CM

## 2021-12-29 PROCEDURE — 99214 OFFICE O/P EST MOD 30 MIN: CPT | Performed by: NURSE PRACTITIONER

## 2021-12-29 RX ORDER — BUPROPION HYDROCHLORIDE 300 MG/1
300 TABLET ORAL EVERY MORNING
Qty: 30 TABLET | Refills: 1 | Status: SHIPPED | OUTPATIENT
Start: 2021-12-29 | End: 2022-02-23

## 2021-12-29 RX ORDER — BUSPIRONE HYDROCHLORIDE 10 MG/1
TABLET ORAL
Qty: 60 TABLET | Refills: 1 | Status: SHIPPED | OUTPATIENT
Start: 2021-12-29 | End: 2022-02-23 | Stop reason: SDUPTHER

## 2021-12-29 NOTE — PROGRESS NOTES
"This provider is located at the [facility] (through [facility]), [address], [place], [address] using a secure 1-4 [employer] Video Visit through [address]. Patient is being seen remotely via telehealth at their home address in [place], and stated they are in a secure environment for this session. The patient's condition being diagnosed/treated is appropriate for telemedicine. The provider identified herself as well as her credentials.   The patient, and/or patients guardian, consent to be seen remotely, and when consent is given they understand that the consent allows for patient identifiable information to be sent to a third party as needed.   They may refuse to be seen remotely at any time. The electronic data is encrypted and password protected, and the patient and/or guardian has been advised of the potential risks to privacy not withstanding such measures.    You have chosen to receive care through a telehealth visit.  Do you consent to use a video/audio connection for your medical care today? Yes        Subjective   Franca Ruiz is a 31 y.o. female who presents today for follow up    Chief Complaint:  Mood instability, anxiety, substance abuse    Accompanied by: Pt was alone for duration of appointment    History of Present Illness:   Pt states she enjoyed her holidays \"alright\". Pt went back home and visited with her father. Pt reports she continues to do well on her medication. Pt finally received her C-pap machine and is sleeping \"amazingly well\" since she has been without it for a year. Pt isn't feeling as fatigued. Pt denies depression, mood stable. Pt continues to work and has little anxiety. Pt has maintained sobriety for 11 months now. Pt's appetite has been stable. The patient denies any new medical problems or changes in medications since last appointment with this facility. The patient reports compliance with current medication regimen. The patient denies any " current side effects from her current medication regimen. The patient denies any abnormal muscle movements or tics. The patient would like to not adjust or change their medications at this visit. The patient denies any suicidal or homicidal ideations, plans, or intent at today's encounter and is convincing.  The patient denies any auditory hallucinations or visual hallucinations. The patient does not endorse any significant symptoms consistent with leidy or psychosis at today's encounter.        Prior Psychiatric Medications:  Wellbutrin XL: is helpful  Naltrexone: is helpful   Hydroxyzine: helps, but is sedating  Trazodone: takes PRN and is helpful  Buspar: unsure if effective  Depakote ER: may be beneficial  Paxil:   Zoloft  Celexa  Prozac: may have been helpful  Cymbalta: may have been helpful  Seroquel: side effects; too sedating  Abilify: sedating  Vraylar: helpful        The following portions of the patient's history were reviewed and updated as appropriate: allergies, current medications, past family history, past medical history, past social history, past surgical history and problem list.          Past Medical History:  Past Medical History:   Diagnosis Date   • Anxiety    • Arthritis    • Bilateral ovarian cysts    • Bipolar disorder (HCC)    • Chronic pain    • Depression    • Obesity    • Sleep apnea    • Substance abuse (HCC)        Social History:  Social History     Socioeconomic History   • Marital status: Single   Tobacco Use   • Smoking status: Former Smoker     Packs/day: 1.00     Years: 10.00     Pack years: 10.00     Types: Cigarettes     Start date: 2020     Quit date: 2020     Years since quittin.9   • Smokeless tobacco: Never Used   Vaping Use   • Vaping Use: Every day   Substance and Sexual Activity   • Alcohol use: Never   • Drug use: Not Currently     Types: Benzodiazepines, Hydrocodone, Marijuana, MDMA (ecstacy), Methamphetamines, Morphine, Oxycodone     Comment:   2021 (Last use)   • Sexual activity: Yes     Partners: Male     Birth control/protection: Condom       Family History:  Family History   Problem Relation Age of Onset   • Mental illness Mother    • Arthritis Father    • Hypertension Father    • Mental illness Father    • Obesity Father    • Bipolar disorder Father    • Alcohol abuse Father    • Kidney disease Paternal Aunt    • Mental illness Paternal Aunt    • Bipolar disorder Paternal Aunt    • Mental illness Paternal Uncle    • Sleep apnea Other    • Asthma Other    • COPD Other    • Restless legs syndrome Other        Past Surgical History:  Past Surgical History:   Procedure Laterality Date   • ANKLE SURGERY Right    • WISDOM TOOTH EXTRACTION         Problem List:  Patient Active Problem List   Diagnosis   • Moderate episode of recurrent major depressive disorder (HCC)   • Mild obstructive sleep apnea   • Bilateral carpal tunnel syndrome   • Morbid obesity with BMI of 50.0-59.9, adult ([facility])       Allergy:   Allergies   Allergen Reactions   • Augmentin [Amoxicillin-Pot Clavulanate] Other (See Comments)     Throat blisters   • [place] Flavor [Flavoring Agent] Unknown - Low Severity   • Vantin [Cefpodoxime] Other (See Comments)     Throat blisters        Current Medications:   Current Outpatient Medications   Medication Sig Dispense Refill   • buprenorphine-naloxone (SUBOXONE) 8-2 MG per SL tablet      • buPROPion XL (Wellbutrin XL) 300 MG 24 hr tablet Take 1 tablet by mouth Every Morning. 30 tablet 1   • busPIRone (BUSPAR) 10 MG tablet Take 1 tablet PO BID 60 tablet 1     No current facility-administered medications for this visit.       Review of Symptoms:    Review of Systems   Constitutional: Negative.    Psychiatric/Behavioral: Negative.          Physical Exam:   Due to the remote nature of this encounter (virtual encounter), vitals were unable to be obtained.  Height stated at 61 inches.  Weight stated at 290 pounds.      Physical Exam  Neurological:       Mental Status: She is alert.   Psychiatric:         Attention and Perception: Attention and perception normal.         Mood and Affect: Mood and affect normal.         Speech: Speech normal.         Behavior: Behavior is cooperative.         Thought Content: Thought content normal. Thought content is not paranoid or delusional. Thought content does not include homicidal or suicidal ideation. Thought content does not include homicidal or suicidal plan.         Cognition and Memory: Cognition and memory normal.           Mental Status Exam:   Hygiene:   good  Cooperation:  Cooperative  Eye Contact:  Fair  Psychomotor Behavior:  Appropriate  Affect:  Full range  Mood: euthymic  Speech:  Normal  Thought Process:  Goal directed  Thought Content:  Normal  Suicidal:  None  Homicidal:  None  Hallucinations:  None  Delusion:  None  Memory:  Intact  Orientation:  Person, Place, Time and Situation  Reliability:  good  Insight:  Good  Judgement:  Good  Impulse Control:  Fair  Physical/Medical Issues:  No          Previous Provider notes and available records reviewed by this APRN at today's encounter.         Lab Results:   Admission on 12/01/2021, Discharged on 12/01/2021   Component Date Value Ref Range Status   • SARS-CoV-2 SCOTT 12/01/2021 Not Detected  Not Detected Final         Assessment/Plan   Problems Addressed this Visit     None      Visit Diagnoses     Bipolar I disorder, most recent episode depressed (HCC)  (Chronic)   -  Primary    Relevant Medications    busPIRone (BUSPAR) 10 MG tablet    buPROPion XL (Wellbutrin XL) 300 MG 24 hr tablet    Generalized anxiety disorder  (Chronic)       Relevant Medications    busPIRone (BUSPAR) 10 MG tablet    buPROPion XL (Wellbutrin XL) 300 MG 24 hr tablet    Amphetamine-type substance use disorder, moderate, in early remission (HCC)          Diagnoses       Codes Comments    Bipolar I disorder, most recent episode depressed (HCC)    -  Primary ICD-10-CM: F31.30  ICD-9-CM: 296.50      Generalized anxiety disorder     ICD-10-CM: F41.1  ICD-9-CM: 300.02     Amphetamine-type substance use disorder, moderate, in early remission ([facility])     ICD-10-CM: F15.21  ICD-9-CM: 305.73           Visit Diagnoses:    ICD-10-CM ICD-9-CM   1. Bipolar I disorder, most recent episode depressed ([facility])  F31.30 296.50   2. Generalized anxiety disorder  F41.1 300.02   3. Amphetamine-type substance use disorder, moderate, in early remission ([facility])  F15.21 305.73          GOALS:  Short Term Goals: Patient will be compliant with medication, and patient will have no significant medication related side effects.  Patient will be engaged in psychotherapy as indicated.  Patient will report subjective improvement of symptoms.  Long term goals: To stabilize mood and treat/improve subjective symptoms, the patient will stay out of the hospital, the patient will be at an optimal level of functioning, and the patient will take all medications as prescribed.  The patient verbalized understanding and agreement with goals that were mutually set.      TREATMENT PLAN:   -Continue Buspar 10 mg PO BID for anxiety.   -Continue Wellbutrin  mg PO QAM for depression.   -Pt is prescribed Suboxone from another provider    Medication and treatment options, both pharmacological and non-pharmacological treatment options, discussed during today's visit, including any off label use of medication. Patient acknowledged and verbally consented with current treatment plan and was educated on the importance of compliance with treatment and follow-up appointments.        MEDICATION ISSUES:    Discussed treatment plan and medication options of prescribed medication as well as the risks, benefits, any black box warnings, and side effects including potential falls, possible impaired driving, and metabolic adversities among others, including any off label use of medication. Patient is agreeable to call the office with any worsening of symptoms or onset of  side effects, or if any concerns or questions arise.  The contact information for the office is made available to the patient. Patient is agreeable to call 911 or go to the nearest ER should they begin having any SI/HI, or if any urgent concerns arise. No medication side effects or related complaints today.       MEDS ORDERED DURING VISIT:  New Medications Ordered This Visit   Medications   • busPIRone (BUSPAR) 10 MG tablet     Sig: Take 1 tablet PO BID     Dispense:  60 tablet     Refill:  1   • buPROPion XL (Wellbutrin XL) 300 MG 24 hr tablet     Sig: Take 1 tablet by mouth Every Morning.     Dispense:  30 tablet     Refill:  1       Return in about 8 weeks (around 2/23/2022), or if symptoms worsen or fail to improve, for Recheck.     Treatment plan completed: 3/22/21    Progress toward goal: Not at goal    Functional Status: Mild impairment     Prognosis: Fair with Ongoing Treatment         This document has been electronically signed by JENNIFER Reed  December 29, 2021 08:13 EST     Some of the data in this electronic note has been brought forward from a previous encounter, any necessary changes have been made, it has been reviewed by this APRN, and it is accurate.    Please note that portions of this note were completed with a voice recognition program. Efforts were made to edit dictation, but occasionally words are mistranscribed.

## 2022-01-20 ENCOUNTER — HOSPITAL ENCOUNTER (OUTPATIENT)
Dept: NEUROLOGY | Facility: HOSPITAL | Age: 32
Discharge: HOME | End: 2022-01-20
Admitting: NURSE PRACTITIONER

## 2022-01-20 DIAGNOSIS — G56.03 BILATERAL CARPAL TUNNEL SYNDROME: ICD-10-CM

## 2022-01-20 PROCEDURE — 95910 NRV CNDJ TEST 7-8 STUDIES: CPT

## 2022-01-20 PROCEDURE — 95886 MUSC TEST DONE W/N TEST COMP: CPT

## 2022-01-24 ENCOUNTER — OFFICE VISIT (OUTPATIENT)
Dept: SLEEP MEDICINE | Facility: HOSPITAL | Age: 32
End: 2022-01-24

## 2022-01-24 VITALS
HEIGHT: 61 IN | BODY MASS INDEX: 55.32 KG/M2 | SYSTOLIC BLOOD PRESSURE: 126 MMHG | DIASTOLIC BLOOD PRESSURE: 73 MMHG | OXYGEN SATURATION: 98 % | WEIGHT: 293 LBS | HEART RATE: 65 BPM

## 2022-01-24 DIAGNOSIS — G47.33 OSA (OBSTRUCTIVE SLEEP APNEA): Primary | ICD-10-CM

## 2022-01-24 PROCEDURE — 99213 OFFICE O/P EST LOW 20 MIN: CPT | Performed by: NURSE PRACTITIONER

## 2022-01-24 NOTE — PROGRESS NOTES
Chief Complaint:   Chief Complaint   Patient presents with   • Follow-up       HPI:    Franca Ruiz is a 31 y.o. female here for follow-up of sleep apnea.  Patient was last seen in consult 7/21/2021 for excessive daytime sleepiness, snoring, gasping, morning headaches 3 out of 7 days.  She had a sleep study 8/21/2021 did show mild obstructive sleep apnea with an AHI of 8.1.  Patient states she is doing very well with CPAP therapy.  She is sleeping 8 hours nightly and does feel rested upon awakening.  She will go to sleep within 20 to 30 minutes is very rare that she will get up during the night.  Now the patient is on CPAP she has an [place] score of 4/24 as before starting CPAP she had 16/24.  Patient states she is doing very well has no concerns or complaints and wishes to continue CPAP.        Current medications are:   Current Outpatient Medications:   •  buprenorphine-naloxone (SUBOXONE) 8-2 MG per SL tablet, , Disp: , Rfl:   •  buPROPion XL (Wellbutrin XL) 300 MG 24 hr tablet, Take 1 tablet by mouth Every Morning., Disp: 30 tablet, Rfl: 1  •  busPIRone (BUSPAR) 10 MG tablet, Take 1 tablet PO BID, Disp: 60 tablet, Rfl: 1.      The patient's relevant past medical, surgical, family and social history were reviewed and updated in Epic as appropriate.       Review of Systems   Respiratory: Positive for apnea.    Psychiatric/Behavioral: Positive for agitation, behavioral problems, decreased concentration and sleep disturbance. The patient is nervous/anxious.    All other systems reviewed and are negative.        Objective:    Physical Exam  Skin:     General: Skin is warm and dry.   Neurological:      Mental Status: She is oriented to person, place, and time.   Psychiatric:         Mood and Affect: Mood normal.         Behavior: Behavior normal.         Thought Content: Thought content normal.         Judgment: Judgment normal.       63/65 days of use  Greater than 4-hour use 89%  95th percentile pressure  9.3  AHI of 0.5  Settings 8-18    ASSESSMENT/PLAN    Diagnoses and all orders for this visit:    1. JULIAN (obstructive sleep apnea) (Primary)  -     CPAP Therapy            1. Counseled patient regarding multimodal approach with healthy nutrition, healthy sleep, regular physical activity, social activities, counseling, and medications. Encouraged to practice lateral sleep position. Avoid alcohol and sedatives close to bedtime.  2. Refill supplies x1 year.  Return to clinic 1 year or sooner symptoms warrant.    I have reviewed the results of my evaluation and impression and discussed my recommendations in detail with the patient.      Signed by  JENNIFER Mullins    January 24, 2022      CC: Coral Pedro APRN          No ref. provider found

## 2022-01-25 ENCOUNTER — OFFICE VISIT (OUTPATIENT)
Dept: ORTHOPEDIC SURGERY | Facility: CLINIC | Age: 32
End: 2022-01-25

## 2022-01-25 VITALS — HEIGHT: 61 IN | WEIGHT: 293 LBS | BODY MASS INDEX: 55.32 KG/M2

## 2022-01-25 DIAGNOSIS — G56.03 CARPAL TUNNEL SYNDROME, BILATERAL: ICD-10-CM

## 2022-01-25 DIAGNOSIS — M79.642 BILATERAL HAND PAIN: Primary | ICD-10-CM

## 2022-01-25 DIAGNOSIS — E66.01 MORBID OBESITY: ICD-10-CM

## 2022-01-25 DIAGNOSIS — M79.641 BILATERAL HAND PAIN: Primary | ICD-10-CM

## 2022-01-25 PROCEDURE — 99214 OFFICE O/P EST MOD 30 MIN: CPT | Performed by: PHYSICIAN ASSISTANT

## 2022-01-25 PROCEDURE — 20526 THER INJECTION CARP TUNNEL: CPT | Performed by: PHYSICIAN ASSISTANT

## 2022-01-25 RX ADMIN — LIDOCAINE HYDROCHLORIDE 1 ML: 10 INJECTION, SOLUTION EPIDURAL; INFILTRATION; INTRACAUDAL; PERINEURAL at 09:41

## 2022-01-25 RX ADMIN — TRIAMCINOLONE ACETONIDE 20 MG: 40 INJECTION, SUSPENSION INTRA-ARTICULAR; INTRAMUSCULAR at 09:41

## 2022-01-25 RX ADMIN — TRIAMCINOLONE ACETONIDE 20 MG: 40 INJECTION, SUSPENSION INTRA-ARTICULAR; INTRAMUSCULAR at 09:43

## 2022-01-25 RX ADMIN — LIDOCAINE HYDROCHLORIDE 1 ML: 10 INJECTION, SOLUTION EPIDURAL; INFILTRATION; INTRACAUDAL; PERINEURAL at 09:43

## 2022-01-25 NOTE — PROGRESS NOTES
[facility] [place] [facility] Note    Subjective     Chief Complaint   Patient presents with   • Left Wrist - Pain   • Right Wrist - Pain        HPI  Franca Ruiz is a 31 y.o. female.  Writes with her right hand but does everything else left-handed.  New patient presents for evaluation of bilateral hand pain with numbness and tingling predominantly to the radial 3 digits, left greater than right.  Symptoms/pain have been ongoing 7-8 years.  JAVIER: No history of injury or trauma but does work in manual labor on a production line.    Pain scale: 5-6/10.  Severity of the pain moderate.  Quality of the pain aching, burning.  Associated symptoms swelling.  Activity related to pain sleeping, working.  Pain relieved by nothing.  Prior treatments prior braces did help for a while at night but no longer working.    Difficulty with gripping, grasping and holding onto objects.    Denies fever, chills, night sweats or other constitutional symptoms.  BMI 56.26.      Past Medical History:   Diagnosis Date   • Anxiety    • Arthritis    • Bilateral ovarian cysts    • Bipolar disorder (HCC)    • Chronic pain    • Depression    • Obesity    • Sleep apnea    • Substance abuse (HCC)       Past Surgical History:   Procedure Laterality Date   • ANKLE SURGERY Right    • WISDOM TOOTH EXTRACTION        Family History   Problem Relation Age of Onset   • Mental illness Mother    • Arthritis Father    • Hypertension Father    • Mental illness Father    • Obesity Father    • Bipolar disorder Father    • Alcohol abuse Father    • Kidney disease Paternal Aunt    • Mental illness Paternal Aunt    • Bipolar disorder Paternal Aunt    • Mental illness Paternal Uncle    • Sleep apnea Other    • Asthma Other    • COPD Other    • Restless legs syndrome Other      Social History     Socioeconomic History   • Marital status: Single   Tobacco Use   • Smoking status: Former Smoker     Packs/day: 1.00     Years: 10.00     Pack years: 10.00     Types:  "Cigarettes     Start date: 2020     Quit date: 2020     Years since quittin.0   • Smokeless tobacco: Never Used   Vaping Use   • Vaping Use: Every day   Substance and Sexual Activity   • Alcohol use: Never   • Drug use: Not Currently     Types: Benzodiazepines, Hydrocodone, Marijuana, MDMA (ecstacy), Methamphetamines, Morphine, Oxycodone     Comment: 2021 (Last use)   • Sexual activity: Yes     Partners: Male     Birth control/protection: Condom      Current Outpatient Medications on File Prior to Visit   Medication Sig Dispense Refill   • buprenorphine-naloxone (SUBOXONE) 8-2 MG per SL tablet      • buPROPion XL (Wellbutrin XL) 300 MG 24 hr tablet Take 1 tablet by mouth Every Morning. 30 tablet 1   • busPIRone (BUSPAR) 10 MG tablet Take 1 tablet PO BID 60 tablet 1     No current facility-administered medications on file prior to visit.      Allergies   Allergen Reactions   • Augmentin [Amoxicillin-Pot Clavulanate] Other (See Comments)     Throat blisters   • [place] Flavor [Flavoring Agent] Unknown - Low Severity   • Vantin [Cefpodoxime] Other (See Comments)     Throat blisters        The following portions of the patient's history were reviewed and updated as appropriate: allergies, current medications, past family history, past medical history, past social history, past surgical history and problem list.    Review of Systems   Constitutional: Negative.    HENT: Negative.    Eyes: Negative.    Respiratory: Negative.    Cardiovascular: Negative.    Gastrointestinal: Negative.    Endocrine: Negative.    Genitourinary: Negative.    Musculoskeletal: Positive for arthralgias.   Skin: Negative.    Allergic/Immunologic: Negative.    Neurological: Negative.    Hematological: Negative.    Psychiatric/Behavioral: Negative.         Objective      Physical Exam  Ht 154.9 cm (60.98\")   Wt 135 kg (297 lb 9.9 oz)   BMI 56.26 kg/m²     Body mass index is 56.26 kg/m².    GENERAL APPEARANCE: awake, alert & " oriented x 3, in no acute distress and well developed, well nourished  PSYCH: normal mood and affect  LUNGS:  breathing nonlabored, no wheezing  EYES: sclera anicteric, pupils equal  CARDIOVASCULAR: palpable pulses. Capillary refill less than 2 seconds  INTEGUMENTARY: skin intact, no clubbing, cyanosis  NEUROLOGIC:  Normal Sensation        Ortho Exam  Peripheral Vascular   Bilateral Upper Extremity    No cyanotic nail beds    Pink nail beds and rapid capillary refill   Palpation    Radial Pulse - Bilaterally normal    Neurologic   Sensory: Light touch intact- Right and left hand    Left Upper Extremity    Left wrist extensors: 5/5    Left wrist flexors: 5/5    Left intrinsics: 5/5      Right Upper Extremity    Right wrist extensors: 5/5    Right wrist flexors: 5/5    Right intrinsics: 5/5    Musculoskeletal   Left Elbow    Forearm supination: AROM - 90 degrees    Forearm pronation: AROM - 90 degrees   Right Elbow    Forearm supination: AROM - 90 degrees    Forearm pronation: AROM - 90 degrees     Inspection and Palpation   Right Wrist      Tenderness - none    Swelling - none    Crepitus - none    Muscle tone - no atrophy   Left Wrist    Tenderness - none    Swelling - none    Crepitus - none    Muscle tone - no atrophy     ROJM:   Left Wrist    Flexion: AROM - 90 degrees    Extension: AROM - 90 degrees   Right Wrist    Flexion: AROM - 90 degrees    Extension: AROM - 90 degrees     Deformities, Malalignments, Discrepancies    None     Functional Testing   Right    Tinel's Sign-- negative    Phalen's Sign--positive    Carpal Compression Test--positive    Thenar wasting--negative    APB--4+/5    Elbow Flexion test--negative    Cubital tunnel signs--negative   Left     Tinel's Sign--negative    Phalen's Sign--positive    Carpal Compression Test-- positive    Thenar Wasting--minimal    APB--4+/5    Elbow Flexion test--positive    Cubital tunnel signs--negative       Strength and Tone    Right  strength: good    Left   strength: good     Hand Exam: Patient is able to make composite fist but has difficulty holding the fist tightly, especially on left.      Imaging/Studies  Ordered bilateral wrists plain films.  Imaging read/interpreted by Dr. Ng.    Imaging Results (Last 7 Days)     Procedure Component Value Units Date/Time    XR Wrist 3+ View Bilateral [[id number]] Resulted: 01/25/22 0929     Updated: 01/25/22 0930    Narrative:      Bilateral Wrist X-Ray    Indication: Pain    Views:  AP, Lateral, and Oblique     Comparison: None    Findings:  No fracture  No bony lesion  Normal soft tissues  Normal joint spaces    Impression:   Negative bilateral wrist x-ray for acute bony abnormality        Reviewed EMG/NCS performed on 1/20/2022.  Left median nerve: Sensory latency 5.2 ms.  Motor latency 7.5 ms with conduction velocity 39 m/s.  Right median nerve: Sensory latency 3.1 ms.  Motor latency 7.3 ms with motor conduction to 1 m/s.  Left APB showed chronic denervation.  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  Assessment/Plan        ICD-10-CM ICD-9-CM   1. Bilateral hand pain  M79.641 729.5    M79.642    2. Carpal tunnel syndrome, bilateral  G56.03 354.0   3. Morbid obesity (HCC)  E66.01 278.01       Orders Placed This Encounter   Procedures   • XR Wrist 3+ View Bilateral        -Bilateral hand pain with bilateral carpal tunnel, left greater than right.  -Treatment options were discussed with the patient.  Based on her exam, chronicity and EMG/NCS findings she is a candidate for carpal tunnel release; however, due to her morbid obesity with BMI of 56, we are unable to offer surgical intervention at this time.  When she can get her BMI to less than 50, then we can proceed with surgical intervention.  I did offer to send her to  for treatment secondary to our limitations with regards to her BMI.  She wishes to stay within the [facility] system for now.  -Patient was offered and accepted  corticosteroid injections bilateral carpal tunnels.  Injections were given today.    -Patient provided bilateral cock-up wrist splints to wear at night.  -Morbid obesity--patient has a BMI of 56.26.  The patient has been instructed on various weight loss avenues including diet, portion control, calorie restriction, low/no impact exercise, referral to weight loss management and/or bariatric surgery.  It was explained that weight loss can improve joint pain alone by decreasing the joint reaction forces.  For every pound of weight change, the knee and hip joints see a 4 to 5 fold change in pressure.  Given these options, the patient will proceed with diet and low impact exercise.  At this time patient understands she needs to lose approximately 35 pounds in order to proceed with surgical intervention.  -Recommend over-the-counter pain medications as needed.  -Follow up in 2 months for repeat evaluation.  If she changes her mind regarding surgical intervention wishes to be referred to [facility] she can contact the clinic and I will be happy to place a referral.  -Questions and concerns answered.    After discussing the risks, benefits, indications of injection, the patient gave consent to proceed.  Both wrists, carpal tunnels were confirmed as the correct site to be injected with a timeout.  Each was then prepped using Hibiclens and injected with a mixture of 1 cc of 1% plain lidocaine and 1 cc of Kenalog (40 mg per mL), without any resistance through the volar approach, patient in seated position.  Area was cleaned, hemostasis was achieved and a Band-Aid was applied over the injection site.  The patient tolerated procedure well.  I instructed the patient on signs and symptoms of infection.  They should report to the emergency department or return to clinic if any of these develop, for further evaluation and treatment.  Recommended modifying activity for the next 48 hours to include rest, ice, elevation and oral pain  medication as needed.      History, exam, studies and imaging were all discussed with Dr. Ng who agrees with the above assessment and plan.      Medical Decision Making  Management Options : prescription/IM medicine  Data/Risk: radiology tests, EMG/NCS study      Sherlyn Carpenter PA-C  01/25/22  09:33 EST               EMR Dragon/Transcription disclaimer:  Much of this encounter note is an electronic transcription of spoken language to printed text. Electronic transcription of spoken language may permit erroneous, or at times, nonsensical words or phrases to be inadvertently transcribed. Although I have reviewed the note for such errors, some may still exist.

## 2022-01-25 NOTE — PROGRESS NOTES
Procedure   Medium Joint Arthrocentesis  Date/Time: 1/25/2022 9:41 AM  Consent given by: patient  Site marked: site marked  Timeout: Immediately prior to procedure a time out was called to verify the correct patient, procedure, equipment, support staff and site/side marked as required   Supporting Documentation  Indications: pain   Procedure Details  Location: wrist - Wrist joint: right carpal tunnel cortisone injection   Preparation: Patient was prepped and draped in the usual sterile fashion  Needle size: 25 G  Approach: volar  Medications administered: 20 mg triamcinolone acetonide 40 MG/ML; 1 mL lidocaine PF 1% 1 %  Patient tolerance: patient tolerated the procedure well with no immediate complications    Medium Joint Arthrocentesis  Date/Time: 1/25/2022 9:43 AM  Consent given by: patient  Site marked: site marked  Timeout: Immediately prior to procedure a time out was called to verify the correct patient, procedure, equipment, support staff and site/side marked as required   Supporting Documentation  Indications: pain   Procedure Details  Location: wrist - Wrist joint: Left carpal tunnel cortisone injection   Preparation: Patient was prepped and draped in the usual sterile fashion  Needle size: 24 G  Approach: volar  Medications administered: 20 mg triamcinolone acetonide 40 MG/ML; 1 mL lidocaine PF 1% 1 %  Patient tolerance: patient tolerated the procedure well with no immediate complications

## 2022-01-27 RX ORDER — LIDOCAINE HYDROCHLORIDE 10 MG/ML
1 INJECTION, SOLUTION EPIDURAL; INFILTRATION; INTRACAUDAL; PERINEURAL
Status: COMPLETED | OUTPATIENT
Start: 2022-01-25 | End: 2022-01-25

## 2022-01-27 RX ORDER — TRIAMCINOLONE ACETONIDE 40 MG/ML
20 INJECTION, SUSPENSION INTRA-ARTICULAR; INTRAMUSCULAR
Status: COMPLETED | OUTPATIENT
Start: 2022-01-25 | End: 2022-01-25

## 2022-02-23 ENCOUNTER — TELEMEDICINE (OUTPATIENT)
Dept: PSYCHIATRY | Facility: CLINIC | Age: 32
End: 2022-02-23

## 2022-02-23 DIAGNOSIS — F41.1 GENERALIZED ANXIETY DISORDER: Chronic | ICD-10-CM

## 2022-02-23 DIAGNOSIS — F31.30 BIPOLAR I DISORDER, MOST RECENT EPISODE DEPRESSED: Primary | Chronic | ICD-10-CM

## 2022-02-23 DIAGNOSIS — F15.21: ICD-10-CM

## 2022-02-23 PROCEDURE — 99214 OFFICE O/P EST MOD 30 MIN: CPT | Performed by: NURSE PRACTITIONER

## 2022-02-23 RX ORDER — BUSPIRONE HYDROCHLORIDE 10 MG/1
TABLET ORAL
Qty: 60 TABLET | Refills: 0 | Status: SHIPPED | OUTPATIENT
Start: 2022-02-23 | End: 2022-03-23 | Stop reason: SDUPTHER

## 2022-02-23 RX ORDER — CARIPRAZINE 1.5 MG/1
1.5 CAPSULE, GELATIN COATED ORAL DAILY
COMMUNITY
Start: 2022-02-10 | End: 2022-02-23 | Stop reason: SDUPTHER

## 2022-02-23 NOTE — PROGRESS NOTES
"This provider is located at the [facility] (through [facility]), [address], [place], [address] using a secure [employer] Video Visit through [employer]. Patient is being seen remotely via telehealth at their home address in [place], and stated they are in a secure environment for this session. The patient's condition being diagnosed/treated is appropriate for telemedicine. The provider identified herself as well as her credentials.   The patient, and/or patients guardian, consent to be seen remotely, and when consent is given they understand that the consent allows for patient identifiable information to be sent to a third party as needed.   They may refuse to be seen remotely at any time. The electronic data is encrypted and password protected, and the patient and/or guardian has been advised of the potential risks to privacy not withstanding such measures.    You have chosen to receive care through a telehealth visit.  Do you consent to use a video/audio connection for your medical care today? Yes        Subjective   Franca Ruiz is a 31 y.o. female who presents today for follow up    Chief Complaint:  Mood instability, anxiety, substance abuse    Accompanied by: Pt was alone for duration of appointment    History of Present Illness:   Pt states she has been doing \"alright\". A few weeks ago, she tried calling the office to make an earlier appointment, but was not able to. Pt realized she was having an manic episode and went to her Suboxone provider who stopped the Buspar and Wellbutrin XL; they restarted her on Vraylar 1.5 mg PO Daily until she could get into see this APRN. Pt felt euphoric, didn't need much sleep and having excessive energy, spent money carelessly. It lasted for about 1.5 weeks before she realized she was manic and made it to the Suboxone clinic. It took about a week before the Vraylar was effective. Pt is back to sleeping now. Anxiety \"comes and " "goes\". \"Sometimes it's like a monster is chasing you up the stairs.\" Appetite stable. The patient denies any new medical problems or changes in medications since last appointment with this facility. The patient reports compliance with current medication regimen. The patient denies any current side effects from her current medication regimen. The patient denies any abnormal muscle movements or tics. The patient rates their depression at a 4/10 on a 0-10 scale, with 10 being the worst. The patient rates their anxiety at a 7/10 on a 0-10 scale, with 10 being the worst. The patient would like to adjust their medications at this visit. The patient denies any suicidal or homicidal ideations, plans, or intent at today's encounter and is convincing.  The patient denies any auditory hallucinations or visual hallucinations. The patient does not endorse any significant symptoms consistent with leidy or psychosis at today's encounter.          Prior Psychiatric Medications:  Wellbutrin XL: is helpful  Naltrexone: is helpful   Hydroxyzine: helps, but is sedating  Trazodone: takes PRN and is helpful  Buspar: unsure if effective  Depakote ER: may be beneficial  Paxil:   Zoloft  Celexa  Prozac: may have been helpful  Cymbalta: may have been helpful  Seroquel: side effects; too sedating  Abilify: sedating  Vraylar: helpful        The following portions of the patient's history were reviewed and updated as appropriate: allergies, current medications, past family history, past medical history, past social history, past surgical history and problem list.          Past Medical History:  Past Medical History:   Diagnosis Date   • Anxiety    • Arthritis    • Bilateral ovarian cysts    • Bipolar disorder (HCC)    • Chronic pain    • Depression    • Obesity    • Sleep apnea    • Substance abuse (HCC)        Social History:  Social History     Socioeconomic History   • Marital status: Single   Tobacco Use   • Smoking status: Former Smoker    "  Packs/day: 1.00     Years: 10.00     Pack years: 10.00     Types: Cigarettes     Start date: 2020     Quit date: 2020     Years since quittin.1   • Smokeless tobacco: Never Used   Vaping Use   • Vaping Use: Every day   Substance and Sexual Activity   • Alcohol use: Never   • Drug use: Not Currently     Types: Benzodiazepines, Hydrocodone, Marijuana, MDMA (ecstacy), Methamphetamines, Morphine, Oxycodone     Comment: 2021 (Last use)   • Sexual activity: Yes     Partners: Male     Birth control/protection: Condom       Family History:  Family History   Problem Relation Age of Onset   • Mental illness Mother    • Arthritis Father    • Hypertension Father    • Mental illness Father    • Obesity Father    • Bipolar disorder Father    • Alcohol abuse Father    • Kidney disease Paternal Aunt    • Mental illness Paternal Aunt    • Bipolar disorder Paternal Aunt    • Mental illness Paternal Uncle    • Sleep apnea Other    • Asthma Other    • COPD Other    • Restless legs syndrome Other        Past Surgical History:  Past Surgical History:   Procedure Laterality Date   • ANKLE SURGERY Right    • WISDOM TOOTH EXTRACTION         Problem List:  Patient Active Problem List   Diagnosis   • Moderate episode of recurrent major depressive disorder (HCC)   • Mild obstructive sleep apnea   • Bilateral carpal tunnel syndrome   • Morbid obesity with BMI of 50.0-59.9, adult ([facility])       Allergy:   Allergies   Allergen Reactions   • Augmentin [Amoxicillin-Pot Clavulanate] Other (See Comments)     Throat blisters   • [place] Flavor [Flavoring Agent] Unknown - Low Severity   • Vantin [Cefpodoxime] Other (See Comments)     Throat blisters        Current Medications:   Current Outpatient Medications   Medication Sig Dispense Refill   • buprenorphine-naloxone (SUBOXONE) 8-2 MG per SL tablet      • busPIRone (BUSPAR) 10 MG tablet Take 1 tablet PO BID 60 tablet 0   • Cariprazine HCl (Vraylar) 1.5 MG capsule capsule Take 1  capsule by mouth Daily. 30 capsule 0     No current facility-administered medications for this visit.       Review of Symptoms:    Review of Systems   Constitutional: Negative.    Psychiatric/Behavioral: The patient is nervous/anxious.          Physical Exam:   Due to the remote nature of this encounter (virtual encounter), vitals were unable to be obtained.  Height stated at 61 inches.  Weight stated at 297 pounds.      Physical Exam  Neurological:      Mental Status: She is alert.   Psychiatric:         Attention and Perception: Attention and perception normal. She does not perceive auditory or visual hallucinations.         Mood and Affect: Mood and affect normal.         Speech: Speech normal.         Behavior: Behavior is cooperative.         Thought Content: Thought content normal. Thought content is not paranoid or delusional. Thought content does not include homicidal or suicidal ideation. Thought content does not include homicidal or suicidal plan.         Cognition and Memory: Cognition and memory normal.           Mental Status Exam:   Hygiene:   good  Cooperation:  Cooperative  Eye Contact:  Fair  Psychomotor Behavior:  Appropriate  Affect:  Full range  Mood: normal  Speech:  Normal  Thought Process:  Linear  Thought Content:  Normal  Suicidal:  None  Homicidal:  None  Hallucinations:  None  Delusion:  None  Memory:  Intact  Orientation:  Person, Place, Time and Situation  Reliability:  good  Insight:  Good  Judgement:  Good  Impulse Control:  Fair  Physical/Medical Issues:  No          Previous Provider notes and available records reviewed by this APRN at today's encounter.         Lab Results:   No visits with results within 1 Month(s) from this visit.   Latest known visit with results is:   Admission on 12/01/2021, Discharged on 12/01/2021   Component Date Value Ref Range Status   • SARS-CoV-2 SCOTT 12/01/2021 Not Detected  Not Detected Final         Assessment/Plan   Problems Addressed this Visit      None      Visit Diagnoses     Bipolar I disorder, most recent episode depressed (HCC)  (Chronic)   -  Primary    Relevant Medications    Cariprazine HCl (Vraylar) 1.5 MG capsule capsule    busPIRone (BUSPAR) 10 MG tablet    Generalized anxiety disorder  (Chronic)       Relevant Medications    Cariprazine HCl (Vraylar) 1.5 MG capsule capsule    busPIRone (BUSPAR) 10 MG tablet    Amphetamine-type substance use disorder, moderate, in sustained remission (HCC)          Diagnoses       Codes Comments    Bipolar I disorder, most recent episode depressed (HCC)    -  Primary ICD-10-CM: F31.30  ICD-9-CM: 296.50     Generalized anxiety disorder     ICD-10-CM: F41.1  ICD-9-CM: 300.02     Amphetamine-type substance use disorder, moderate, in sustained remission (HCC)     ICD-10-CM: F15.21  ICD-9-CM: 305.73           Visit Diagnoses:    ICD-10-CM ICD-9-CM   1. Bipolar I disorder, most recent episode depressed (HCC)  F31.30 296.50   2. Generalized anxiety disorder  F41.1 300.02   3. Amphetamine-type substance use disorder, moderate, in sustained remission (HCC)  F15.21 305.73          GOALS:  Short Term Goals: Patient will be compliant with medication, and patient will have no significant medication related side effects.  Patient will be engaged in psychotherapy as indicated.  Patient will report subjective improvement of symptoms.  Long term goals: To stabilize mood and treat/improve subjective symptoms, the patient will stay out of the hospital, the patient will be at an optimal level of functioning, and the patient will take all medications as prescribed.  The patient verbalized understanding and agreement with goals that were mutually set.      TREATMENT PLAN:   -Restart Buspar 10 mg PO BID for anxiety.   -Discontinue Wellbutrin XL (discontinued between appointments)  -Continue Vraylar 1.5 mg PO Daily for bipolar disorder (taking over prescription that was started by her Suboxone provider)   -Pt is prescribed Suboxone from  another provider    Medication and treatment options, both pharmacological and non-pharmacological treatment options, discussed during today's visit, including any off label use of medication. Patient acknowledged and verbally consented with current treatment plan and was educated on the importance of compliance with treatment and follow-up appointments.        MEDICATION ISSUES:    Discussed treatment plan and medication options of prescribed medication as well as the risks, benefits, any black box warnings, and side effects including potential falls, possible impaired driving, and metabolic adversities among others, including any off label use of medication. Patient is agreeable to call the office with any worsening of symptoms or onset of side effects, or if any concerns or questions arise.  The contact information for the office is made available to the patient. Patient is agreeable to call 911 or go to the nearest ER should they begin having any SI/HI, or if any urgent concerns arise. No medication side effects or related complaints today.       MEDS ORDERED DURING VISIT:  New Medications Ordered This Visit   Medications   • Cariprazine HCl (Vraylar) 1.5 MG capsule capsule     Sig: Take 1 capsule by mouth Daily.     Dispense:  30 capsule     Refill:  0   • busPIRone (BUSPAR) 10 MG tablet     Sig: Take 1 tablet PO BID     Dispense:  60 tablet     Refill:  0       Return in about 4 weeks (around 3/23/2022), or if symptoms worsen or fail to improve, for Recheck.     Treatment plan completed: 2/23/22    Progress toward goal: Not at goal    Functional Status: Mild impairment     Prognosis: Fair with Ongoing Treatment         This document has been electronically signed by JENNIFER Reed  February 23, 2022 13:18 EST     Some of the data in this electronic note has been brought forward from a previous encounter, any necessary changes have been made, it has been reviewed by this APRN, and it is accurate.    Please  note that portions of this note were completed with a voice recognition program. Efforts were made to edit dictation, but occasionally words are mistranscribed.

## 2022-02-23 NOTE — TREATMENT PLAN
Multi-Disciplinary Problems (from Behavioral Health Treatment Plan)    Active Problems     Problem: Mood Instability  Start Date: 02/23/22    Problem Details: The patient self-scales this problem as a 4 with 10 being the worst.        Goal Priority Start Date Expected End Date End Date    Patient will achieve mood stability as evidenced by controlled behavior and a more deliberate thought process -- 02/23/22 -- --    Goal Details: Progress toward goal:  The patient self-scales their progress related to this goal as a 4 with 10 being the worst.        Goal Intervention Frequency Start Date End Date    Provide structure and focus to patient's thoughts and actions by establishing plans and routine. Q Month 02/23/22 --    Intervention Details: Duration of treatment until until discharged.        Goal Intervention Frequency Start Date End Date    Assist patient in setting responsible goals and limits in behavior. Q Month 02/23/22 --    Intervention Details: Duration of treatment until until discharged.              Problem: Anxiety  Start Date: 02/23/22    Problem Details: The patient self-scales this problem as a 7 with 10 being the worst.        Goal Priority Start Date Expected End Date End Date    Patient will develop and implement behavioral and cognitive strategies to reduce anxiety and irrational fears. -- 02/23/22 -- --    Goal Details: Progress toward goal:  The patient self-scales their progress related to this goal as a 7 with 10 being the worst.        Goal Intervention Frequency Start Date End Date    Help patient explore past emotional issues in relation to present anxiety. Q Month 02/23/22 --    Intervention Details: Duration of treatment until until discharged.        Goal Intervention Frequency Start Date End Date    Help patient develop an awareness of their cognitive and physical responses to anxiety. Q Month 02/23/22 --    Intervention Details: Duration of treatment until until discharged.                          I have discussed and reviewed this treatment plan with the patient and/or guardian.  The patient has verbally agreed with this treatment plan (no signatures are obtained at today's visit as the patient is a telehealth patient and is unable to print and sign this document, therefore verbal agreement is obtained).

## 2022-03-14 ENCOUNTER — OFFICE VISIT (OUTPATIENT)
Dept: FAMILY MEDICINE CLINIC | Facility: CLINIC | Age: 32
End: 2022-03-14

## 2022-03-14 VITALS
DIASTOLIC BLOOD PRESSURE: 80 MMHG | SYSTOLIC BLOOD PRESSURE: 130 MMHG | HEART RATE: 83 BPM | HEIGHT: 61 IN | BODY MASS INDEX: 55.32 KG/M2 | WEIGHT: 293 LBS | OXYGEN SATURATION: 99 %

## 2022-03-14 DIAGNOSIS — E66.01 MORBID OBESITY WITH BMI OF 50.0-59.9, ADULT: Primary | ICD-10-CM

## 2022-03-14 DIAGNOSIS — F31.30 BIPOLAR I DISORDER, MOST RECENT EPISODE DEPRESSED: Chronic | ICD-10-CM

## 2022-03-14 DIAGNOSIS — F19.11 HISTORY OF SUBSTANCE ABUSE: ICD-10-CM

## 2022-03-14 DIAGNOSIS — G47.33 MILD OBSTRUCTIVE SLEEP APNEA: ICD-10-CM

## 2022-03-14 DIAGNOSIS — K08.89 PAIN, DENTAL: ICD-10-CM

## 2022-03-14 DIAGNOSIS — Z00.00 ENCOUNTER FOR MEDICAL EXAMINATION TO ESTABLISH CARE: ICD-10-CM

## 2022-03-14 DIAGNOSIS — F33.1 MODERATE EPISODE OF RECURRENT MAJOR DEPRESSIVE DISORDER: ICD-10-CM

## 2022-03-14 PROCEDURE — 99214 OFFICE O/P EST MOD 30 MIN: CPT | Performed by: STUDENT IN AN ORGANIZED HEALTH CARE EDUCATION/TRAINING PROGRAM

## 2022-03-14 RX ORDER — AMOXICILLIN 500 MG/1
500 CAPSULE ORAL 3 TIMES DAILY
Qty: 21 CAPSULE | Refills: 0 | Status: SHIPPED | OUTPATIENT
Start: 2022-03-14 | End: 2022-04-19

## 2022-03-14 RX ORDER — METRONIDAZOLE 500 MG/1
500 TABLET ORAL 2 TIMES DAILY
Qty: 14 TABLET | Refills: 0 | Status: SHIPPED | OUTPATIENT
Start: 2022-03-14 | End: 2022-04-19

## 2022-03-14 NOTE — PROGRESS NOTES
New Patient Office Visit      Patient Name: Franca Ruiz  : 1990   MRN: [id number]   Care Team: Patient Care Team:  Amisha Potts DO as PCP - General (Internal Medicine)    Chief Complaint:    Chief Complaint   Patient presents with   • Establish Care       History of Present Illness: rFanca Ruiz is a 31 y.o. female who is here today to establish care.  Transitioning from [facility] to myself.     Hx of substance abuse, in recovery. Previously living at [facility] sober living, just recently got her own apartment. She is following with [facility] suboxone clinic and participating in group therapy about once monthly. Feels she could benefit from talk therapy and wants to discuss with behavioral health. She denies cravings and has been clean from all substance use for >1 year.    Bipolar, Anxiety - following with . Currently taking Vraylar and Buspar.    JULIAN - using CPAP at night and reports sleeping well    Obesity - has tried various diets (keto, calorie restricting, intermittent fasting, slim fast, weight watchers) over the years without success and exercises 2-3x per week. Tried taking phentermine about 2 years ago but weight returned right after discontinuing this.     Dental pain - complains of 3 day history of dental pain and gum swelling. Denies foul taste in mouth, unable to see dentist until next week. Denies fever, chills, nausea, vomiting.       Subjective      Review of Systems:   Review of Systems - See HPI    Past Medical History:   Past Medical History:   Diagnosis Date   • Allergic    • Anxiety    • Arthritis    • Bilateral ovarian cysts    • Bipolar disorder (HCC)    • Chronic pain    • Depression    • Obesity    • Sleep apnea    • Substance abuse (HCC)        Past Surgical History:   Past Surgical History:   Procedure Laterality Date   • ANKLE SURGERY Right    • WISDOM TOOTH EXTRACTION         Family History:   Family History   Problem Relation Age of Onset    • Mental illness Mother    • Arthritis Father    • Hypertension Father    • Mental illness Father    • Obesity Father    • Bipolar disorder Father    • Alcohol abuse Father    • Kidney disease Paternal Aunt    • Mental illness Paternal Aunt    • Bipolar disorder Paternal Aunt    • Mental illness Paternal Uncle    • Sleep apnea Other    • Asthma Other    • COPD Other    • Restless legs syndrome Other        Social History:   Social History     Socioeconomic History   • Marital status: Single   Tobacco Use   • Smoking status: Former Smoker     Packs/day: 1.00     Years: 10.00     Pack years: 10.00     Types: Cigarettes, Electronic Cigarette     Start date: 2007     Quit date: 2020     Years since quittin.2   • Smokeless tobacco: Never Used   Vaping Use   • Vaping Use: Every day   • Substances: Nicotine, Flavoring   Substance and Sexual Activity   • Alcohol use: Never   • Drug use: Not Currently     Types: Amphetamines, Benzodiazepines, Fentanyl, Hydrocodone, Marijuana, MDMA (ecstacy), Methamphetamines, Morphine, Oxycodone, PCP     Comment: 2021 (Last use)   • Sexual activity: Not Currently     Partners: Male     Birth control/protection: Abstinence, Condom       Tobacco History:   Social History     Tobacco Use   Smoking Status Former Smoker   • Packs/day: 1.00   • Years: 10.00   • Pack years: 10.00   • Types: Cigarettes, Electronic Cigarette   • Start date: 2007   • Quit date: 2020   • Years since quittin.2   Smokeless Tobacco Never Used       Medications:     Current Outpatient Medications:   •  buprenorphine-naloxone (SUBOXONE) 8-2 MG per SL tablet, , Disp: , Rfl:   •  busPIRone (BUSPAR) 10 MG tablet, Take 1 tablet PO BID, Disp: 60 tablet, Rfl: 0  •  Cariprazine HCl (Vraylar) 1.5 MG capsule capsule, Take 1 capsule by mouth Daily., Disp: 30 capsule, Rfl: 0  •  amoxicillin (AMOXIL) 500 MG capsule, Take 1 capsule by mouth 3 (Three) Times a Day., Disp: 21 capsule, Rfl: 0  •   "metroNIDAZOLE (Flagyl) 500 MG tablet, Take 1 tablet by mouth 2 (Two) Times a Day., Disp: 14 tablet, Rfl: 0    Allergies:   Allergies   Allergen Reactions   • Augmentin [Amoxicillin-Pot Clavulanate] Other (See Comments)     Throat blisters   • [place] Flavor [Flavoring Agent] Unknown - Low Severity   • Vantin [Cefpodoxime] Other (See Comments)     Throat blisters       Objective     Physical Exam:  Vital Signs:   Vitals:    03/14/22 0956   BP: 130/80   Pulse: 83   SpO2: 99%   Weight: 135 kg (296 lb 12.8 oz)   Height: 154.9 cm (60.98\")     Body mass index is 56.12 kg/m².     Physical Exam  Vitals reviewed.   Constitutional:       Appearance: Normal appearance. She is obese.   HENT:      Mouth/Throat:      Comments: Right upper molar gigivitis, tenderness to palpation but no obvious abscess or drainage  Cardiovascular:      Rate and Rhythm: Normal rate.      Pulses: Normal pulses.   Pulmonary:      Effort: Pulmonary effort is normal. No respiratory distress.      Breath sounds: No wheezing.   Abdominal:      General: There is no distension.   Skin:     General: Skin is warm and dry.   Neurological:      Mental Status: She is alert.   Psychiatric:         Mood and Affect: Mood normal.         Behavior: Behavior normal.         Judgment: Judgment normal.         Assessment / Plan      Assessment/Plan:   Problems Addressed This Visit  Diagnoses and all orders for this visit:    1. Morbid obesity with BMI of 50.0-59.9, adult (HCC) (Primary)  -     Ambulatory Referral to Weight Management Program    Has failed several diets in the past. Recommended calorie deficit and exercising 150 minutes per week. She is interested in dedicated weight loss program and/or bariatric surgery. Will refer today.    2. Bipolar I disorder, most recent episode depressed (HCC)  3. Moderate episode of recurrent major depressive disorder (HCC)  Following with behavioral health for medication management - currently taking buspar and vraylar and feels " symptoms are well controlled. She will discuss talk therapy with them at her next appointment as she feels this would be beneficial.    4. Mild obstructive sleep apnea  Continue compliance with CPAP    5. Establish Care    6. Hx of substance abuse  In recovery - continue following with suboxone clinic     7. Dental pain  -Empiric Amoxil and Flagyl until she has appointment with her dentist next week          Plan of care reviewed with patient at the conclusion of today's visit. Education was provided regarding diagnosis and management.  Patient verbalizes understanding of and agreement with management plan.      Follow Up:   Return in about 3 months (around 6/14/2022) for Recheck weight .          DO FARIBA Bhagat RD  [facility]  [address]  [facility] [address]  Fax [phone]  Phone [phone]

## 2022-03-22 ENCOUNTER — TELEPHONE (OUTPATIENT)
Dept: ORTHOPEDIC SURGERY | Facility: CLINIC | Age: 32
End: 2022-03-22

## 2022-03-22 DIAGNOSIS — G56.03 CARPAL TUNNEL SYNDROME, BILATERAL: Primary | ICD-10-CM

## 2022-03-22 NOTE — TELEPHONE ENCOUNTER
Called and let her know.  Rebecca  
Patient called in today to cancel her appointment that she had scheduled with Sherlyn at 10:10 due to her tested positive for covid and she would like to know if she could get a referral sent to [facility] for her surgery on both wrist?     Please advise and call pt back at [phone].   
Please advise.  Thanks.  Rebecca  
Referral to [facility] placed.  
Applied

## 2022-03-23 ENCOUNTER — TELEMEDICINE (OUTPATIENT)
Dept: PSYCHIATRY | Facility: CLINIC | Age: 32
End: 2022-03-23

## 2022-03-23 DIAGNOSIS — F15.21: ICD-10-CM

## 2022-03-23 DIAGNOSIS — F31.30 BIPOLAR I DISORDER, MOST RECENT EPISODE DEPRESSED: Primary | Chronic | ICD-10-CM

## 2022-03-23 DIAGNOSIS — F41.1 GENERALIZED ANXIETY DISORDER: Chronic | ICD-10-CM

## 2022-03-23 PROCEDURE — 99214 OFFICE O/P EST MOD 30 MIN: CPT | Performed by: NURSE PRACTITIONER

## 2022-03-23 RX ORDER — BUSPIRONE HYDROCHLORIDE 10 MG/1
TABLET ORAL
Qty: 60 TABLET | Refills: 0 | Status: SHIPPED | OUTPATIENT
Start: 2022-03-23 | End: 2022-04-19 | Stop reason: DRUGHIGH

## 2022-03-23 NOTE — PROGRESS NOTES
"This provider is located at the [facility] (through [facility]), [address], [place], [address] using a secure [employer] Video Visit through [employer] (formerly [employer]). Patient is being seen remotely via telehealth at their home address in [place], and stated they are in a secure environment for this session. The patient's condition being diagnosed/treated is appropriate for telemedicine. The provider identified herself as well as her credentials.   The patient, and/or patients guardian, consent to be seen remotely, and when consent is given they understand that the consent allows for patient identifiable information to be sent to a third party as needed.   They may refuse to be seen remotely at any time. The electronic data is encrypted and password protected, and the patient and/or guardian has been advised of the potential risks to privacy not withstanding such measures.    You have chosen to receive care through a telehealth visit.  Do you consent to use a video/audio connection for your medical care today? Yes        Subjective   Franca Ruiz is a 31 y.o. female who presents today for follow up    Chief Complaint:  Mood instability, anxiety, substance abuse    Accompanied by: Pt was alone for duration of appointment    History of Present Illness:   Pt states she is doing \"pretty well\" on the Vraylar. Pt feels \"content\". 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 Pt is still on the Suboxone and goes to the substance abuse clinic. Pt has maintained sobriety. Pt just received her  Certification. Pt has looking for an open position, but she finds that most want someone with at least a year experience. Appetite stable as is sleep when using her C-Pap. Pt has moved since her last appointment and is living alone. Anxiety has been more manageable. No mood elevations since last appointment. The patient denies any new medical problems or changes in " medications since last appointment with this facility. The patient reports compliance with current medication regimen. The patient denies any current side effects from her current medication regimen. The patient denies any abnormal muscle movements or tics. The patient rates their depression at a 3/10 on a 0-10 scale, with 10 being the worst. The patient rates their anxiety at a 4/10 on a 0-10 scale, with 10 being the worst. The patient would like to not adjust or change their medications at this visit. The patient denies any suicidal or homicidal ideations, plans, or intent at today's encounter and is convincing.  The patient denies any auditory hallucinations or visual hallucinations. The patient does not endorse any significant symptoms consistent with leidy or psychosis at today's encounter.        Prior Psychiatric Medications:  Wellbutrin XL: is helpful  Naltrexone: is helpful   Hydroxyzine: helps, but is sedating  Trazodone: takes PRN and is helpful  Buspar: unsure if effective  Depakote ER: may be beneficial  Paxil:   Zoloft  Celexa  Prozac: may have been helpful  Cymbalta: may have been helpful  Seroquel: side effects; too sedating  Abilify: sedating  Vraylar: helpful        The following portions of the patient's history were reviewed and updated as appropriate: allergies, current medications, past family history, past medical history, past social history, past surgical history and problem list.          Past Medical History:  Past Medical History:   Diagnosis Date   • Allergic    • Anxiety    • Arthritis    • Bilateral ovarian cysts    • Bipolar disorder (HCC)    • Chronic pain    • Depression    • Obesity    • Sleep apnea    • Substance abuse (HCC)        Social History:  Social History     Socioeconomic History   • Marital status: Single   Tobacco Use   • Smoking status: Former Smoker     Packs/day: 1.00     Years: 10.00     Pack years: 10.00     Types: Cigarettes, Electronic Cigarette     Start date:  2007     Quit date: 2020     Years since quittin.2   • Smokeless tobacco: Never Used   Vaping Use   • Vaping Use: Every day   • Substances: Nicotine, Flavoring   Substance and Sexual Activity   • Alcohol use: Never   • Drug use: Not Currently     Types: Amphetamines, Benzodiazepines, Fentanyl, Hydrocodone, Marijuana, MDMA (ecstacy), Methamphetamines, Morphine, Oxycodone, PCP     Comment: 2021 (Last use)   • Sexual activity: Not Currently     Partners: Male     Birth control/protection: Abstinence, Condom       Family History:  Family History   Problem Relation Age of Onset   • Mental illness Mother    • Arthritis Father    • Hypertension Father    • Mental illness Father    • Obesity Father    • Bipolar disorder Father    • Alcohol abuse Father    • Kidney disease Paternal Aunt    • Mental illness Paternal Aunt    • Bipolar disorder Paternal Aunt    • Mental illness Paternal Uncle    • Sleep apnea Other    • Asthma Other    • COPD Other    • Restless legs syndrome Other        Past Surgical History:  Past Surgical History:   Procedure Laterality Date   • ANKLE SURGERY Right    • WISDOM TOOTH EXTRACTION         Problem List:  Patient Active Problem List   Diagnosis   • Moderate episode of recurrent major depressive disorder ([facility])   • Mild obstructive sleep apnea   • Bilateral carpal tunnel syndrome   • Morbid obesity with BMI of 50.0-59.9, adult ([facility])   • History of substance abuse ([facility])       Allergy:   Allergies   Allergen Reactions   • Augmentin [Amoxicillin-Pot Clavulanate] Other (See Comments)     Throat blisters   • Crow Flavor [Flavoring Agent] Unknown - Low Severity   • Vantin [Cefpodoxime] Other (See Comments)     Throat blisters        Current Medications:   Current Outpatient Medications   Medication Sig Dispense Refill   • busPIRone (BUSPAR) 10 MG tablet Take 1 tablet PO BID 60 tablet 0   • Cariprazine HCl (Vraylar) 1.5 MG capsule capsule Take 1 capsule by mouth Daily. 30 capsule  0   • amoxicillin (AMOXIL) 500 MG capsule Take 1 capsule by mouth 3 (Three) Times a Day. 21 capsule 0   • buprenorphine-naloxone (SUBOXONE) 8-2 MG per SL tablet      • metroNIDAZOLE (Flagyl) 500 MG tablet Take 1 tablet by mouth 2 (Two) Times a Day. 14 tablet 0     No current facility-administered medications for this visit.       Review of Symptoms:    Review of Systems   Constitutional: Negative.    Psychiatric/Behavioral: Negative.          Physical Exam:   Due to the remote nature of this encounter (virtual encounter), vitals were unable to be obtained.  Height stated at 61 inches.  Weight stated at 296 pounds.      Physical Exam  Neurological:      Mental Status: She is alert.   Psychiatric:         Attention and Perception: Attention and perception normal. She does not perceive auditory or visual hallucinations.         Mood and Affect: Mood and affect normal.         Speech: Speech normal.         Behavior: Behavior is cooperative.         Thought Content: Thought content normal. Thought content is not paranoid or delusional. Thought content does not include homicidal or suicidal ideation. Thought content does not include homicidal or suicidal plan.         Cognition and Memory: Cognition and memory normal.         Judgment: Judgment normal.           Mental Status Exam:   Hygiene:   good  Cooperation:  Cooperative  Eye Contact:  Good  Psychomotor Behavior:  Appropriate  Affect:  Full range  Mood: normal  Speech:  Normal  Thought Process:  Linear  Thought Content:  Normal  Suicidal:  None  Homicidal:  None  Hallucinations:  None  Delusion:  None  Memory:  Intact  Orientation:  Person, Place, Time and Situation  Reliability:  good  Insight:  Good  Judgement:  Good  Impulse Control:  Good  Physical/Medical Issues:  No          Previous Provider notes and available records reviewed by this APRN at today's encounter.         Lab Results:   No visits with results within 1 Month(s) from this visit.   Latest known  visit with results is:   Admission on 12/01/2021, Discharged on 12/01/2021   Component Date Value Ref Range Status   • SARS-CoV-2 SCOTT 12/01/2021 Not Detected  Not Detected Final         Assessment/Plan   Problems Addressed this Visit    None     Visit Diagnoses     Bipolar I disorder, most recent episode depressed (HCC)  (Chronic)   -  Primary    Relevant Medications    Cariprazine HCl (Vraylar) 1.5 MG capsule capsule    busPIRone (BUSPAR) 10 MG tablet    Generalized anxiety disorder  (Chronic)       Relevant Medications    Cariprazine HCl (Vraylar) 1.5 MG capsule capsule    busPIRone (BUSPAR) 10 MG tablet    Amphetamine-type substance use disorder, moderate, in sustained remission (HCC)          Diagnoses       Codes Comments    Bipolar I disorder, most recent episode depressed (HCC)    -  Primary ICD-10-CM: F31.30  ICD-9-CM: 296.50     Generalized anxiety disorder     ICD-10-CM: F41.1  ICD-9-CM: 300.02     Amphetamine-type substance use disorder, moderate, in sustained remission (HCC)     ICD-10-CM: F15.21  ICD-9-CM: 305.73           Visit Diagnoses:    ICD-10-CM ICD-9-CM   1. Bipolar I disorder, most recent episode depressed (HCC)  F31.30 296.50   2. Generalized anxiety disorder  F41.1 300.02   3. Amphetamine-type substance use disorder, moderate, in sustained remission (HCC)  F15.21 305.73          GOALS:  Short Term Goals: Patient will be compliant with medication, and patient will have no significant medication related side effects.  Patient will be engaged in psychotherapy as indicated.  Patient will report subjective improvement of symptoms.  Long term goals: To stabilize mood and treat/improve subjective symptoms, the patient will stay out of the hospital, the patient will be at an optimal level of functioning, and the patient will take all medications as prescribed.  The patient verbalized understanding and agreement with goals that were mutually set.      TREATMENT PLAN:   -Continue Buspar 10 mg PO BID  for anxiety.   -Continue Vraylar 1.5 mg PO Daily for bipolar disorder  -Pt is prescribed Suboxone from another provider    Medication and treatment options, both pharmacological and non-pharmacological treatment options, discussed during today's visit, including any off label use of medication. Patient acknowledged and verbally consented with current treatment plan and was educated on the importance of compliance with treatment and follow-up appointments.        MEDICATION ISSUES:    Discussed treatment plan and medication options of prescribed medication as well as the risks, benefits, any black box warnings, and side effects including potential falls, possible impaired driving, and metabolic adversities among others, including any off label use of medication. Patient is agreeable to call the office with any worsening of symptoms or onset of side effects, or if any concerns or questions arise.  The contact information for the office is made available to the patient. Patient is agreeable to call 911 or go to the nearest ER should they begin having any SI/HI, or if any urgent concerns arise. No medication side effects or related complaints today.       MEDS ORDERED DURING VISIT:  New Medications Ordered This Visit   Medications   • Cariprazine HCl (Vraylar) 1.5 MG capsule capsule     Sig: Take 1 capsule by mouth Daily.     Dispense:  30 capsule     Refill:  0   • busPIRone (BUSPAR) 10 MG tablet     Sig: Take 1 tablet PO BID     Dispense:  60 tablet     Refill:  0       Return in about 4 weeks (around 4/20/2022), or if symptoms worsen or fail to improve, for Recheck.     Treatment plan completed: 2/23/22    Progress toward goal: Not at goal    Functional Status: Mild impairment     Prognosis: Good with Ongoing Treatment         This document has been electronically signed by JENNIFER Reed  March 23, 2022 13:21 EDT     Some of the data in this electronic note has been brought forward from a previous encounter, any  necessary changes have been made, it has been reviewed by this APRN, and it is accurate.    Please note that portions of this note were completed with a voice recognition program. Efforts were made to edit dictation, but occasionally words are mistranscribed.

## 2022-04-19 ENCOUNTER — TELEMEDICINE (OUTPATIENT)
Dept: PSYCHIATRY | Facility: CLINIC | Age: 32
End: 2022-04-19

## 2022-04-19 DIAGNOSIS — F15.21: ICD-10-CM

## 2022-04-19 DIAGNOSIS — F31.30 BIPOLAR I DISORDER, MOST RECENT EPISODE DEPRESSED: Primary | Chronic | ICD-10-CM

## 2022-04-19 DIAGNOSIS — F41.1 GENERALIZED ANXIETY DISORDER: Chronic | ICD-10-CM

## 2022-04-19 PROCEDURE — 99214 OFFICE O/P EST MOD 30 MIN: CPT | Performed by: NURSE PRACTITIONER

## 2022-04-19 RX ORDER — BUSPIRONE HYDROCHLORIDE 15 MG/1
15 TABLET ORAL 2 TIMES DAILY
Qty: 60 TABLET | Refills: 0 | Status: SHIPPED | OUTPATIENT
Start: 2022-04-19 | End: 2022-05-18 | Stop reason: SDUPTHER

## 2022-04-19 NOTE — PROGRESS NOTES
This provider is located at the [facility] ([facility]), [address], [place], [address] using a secure [employer] Video Visit through [employer]. Patient is being seen remotely via telehealth at their home address in [place], and stated they are in a secure environment for this session. The patient's condition being diagnosed/treated is appropriate for telemedicine. The provider identified herself as well as her credentials.   The patient, and/or patients guardian, consent to be seen remotely, and when consent is given they understand that the consent allows for patient identifiable information to be sent to a third party as needed.   They may refuse to be seen remotely at any time. The electronic data is encrypted and password protected, and the patient and/or guardian has been advised of the potential risks to privacy not withstanding such measures.    You have chosen to receive care through a telehealth visit.  Do you consent to use a video/audio connection for your medical care today? Yes        Subjective   Franca Ruiz is a 31 y.o. female who presents today for follow up    Chief Complaint:  Mood instability, anxiety, substance abuse    Accompanied by: Pt's friend was present during appointment; pt gave verbal permission    History of Present Illness:   Pt states that she is doing fairly well on her medications. However, she has been feeling more depressed lately for unknown reasons. It is manageable, but she believes things could still be improved. Pt feels that her Vraylar may need to be increased. Pt continues her sobriety. Pt has been clean for 13-14 months. Pt's car is in the shop being worked on, which has caused additional stress. Pt continues to work at her job. Sleep is stable as is appetite. Pt doesn't seem as forthcoming with information with friend present. The patient denies any new medical problems or changes in medications since last  appointment with this facility with the exception of upcoming surgery on her left hand for carpal tunnel. It will take 1-2 weeks to recover and then pt plans on having the right hand completed. The patient reports compliance with current medication regimen. The patient denies any current side effects from her current medication regimen. The patient denies any abnormal muscle movements or tics. The patient rates their depression at a 6/10 on a 0-10 scale, with 10 being the worst. The patient rates their anxiety at a 4/10 on a 0-10 scale, with 10 being the worst. The patient would like to increase their medications at this visit. The patient denies any suicidal or homicidal ideations, plans, or intent at today's encounter and is convincing.  The patient denies any auditory hallucinations or visual hallucinations. The patient does not endorse any significant symptoms consistent with leidy or psychosis at today's encounter.        Prior Psychiatric Medications:  Wellbutrin XL: is helpful  Naltrexone: is helpful   Hydroxyzine: helps, but is sedating  Trazodone: takes PRN and is helpful  Buspar: unsure if effective  Depakote ER: may be beneficial  Paxil:   Zoloft  Celexa  Prozac: may have been helpful  Cymbalta: may have been helpful  Seroquel: side effects; too sedating  Abilify: sedating  Vraylar: helpful        The following portions of the patient's history were reviewed and updated as appropriate: allergies, current medications, past family history, past medical history, past social history, past surgical history and problem list.          Past Medical History:  Past Medical History:   Diagnosis Date   • Allergic    • Anxiety    • Arthritis    • Bilateral ovarian cysts    • Bipolar disorder (HCC)    • Chronic pain    • Depression    • Obesity    • Sleep apnea    • Substance abuse (HCC)        Social History:  Social History     Socioeconomic History   • Marital status: Single   Tobacco Use   • Smoking status: Former  Smoker     Packs/day: 1.00     Years: 10.00     Pack years: 10.00     Types: Cigarettes, Electronic Cigarette     Start date: 2007     Quit date: 2020     Years since quittin.2   • Smokeless tobacco: Never Used   Vaping Use   • Vaping Use: Every day   • Substances: Nicotine, Flavoring   Substance and Sexual Activity   • Alcohol use: Never   • Drug use: Not Currently     Types: Amphetamines, Benzodiazepines, Fentanyl, Hydrocodone, Marijuana, MDMA (ecstacy), Methamphetamines, Morphine, Oxycodone, PCP     Comment: 2021 (Last use)   • Sexual activity: Not Currently     Partners: Male     Birth control/protection: Abstinence, Condom       Family History:  Family History   Problem Relation Age of Onset   • Mental illness Mother    • Arthritis Father    • Hypertension Father    • Mental illness Father    • Obesity Father    • Bipolar disorder Father    • Alcohol abuse Father    • Kidney disease Paternal Aunt    • Mental illness Paternal Aunt    • Bipolar disorder Paternal Aunt    • Mental illness Paternal Uncle    • Sleep apnea Other    • Asthma Other    • COPD Other    • Restless legs syndrome Other        Past Surgical History:  Past Surgical History:   Procedure Laterality Date   • ANKLE SURGERY Right    • WISDOM TOOTH EXTRACTION         Problem List:  Patient Active Problem List   Diagnosis   • Moderate episode of recurrent major depressive disorder ([facility])   • Mild obstructive sleep apnea   • Bilateral carpal tunnel syndrome   • Morbid obesity with BMI of 50.0-59.9, adult ([facility])   • History of substance abuse ([facility])       Allergy:   Allergies   Allergen Reactions   • Augmentin [Amoxicillin-Pot Clavulanate] Other (See Comments)     Throat blisters   • Crow Flavor [Flavoring Agent] Unknown - Low Severity   • Vantin [Cefpodoxime] Other (See Comments)     Throat blisters        Current Medications:   Current Outpatient Medications   Medication Sig Dispense Refill   • buprenorphine-naloxone (SUBOXONE)  8-2 MG per SL tablet      • busPIRone (BUSPAR) 15 MG tablet Take 1 tablet by mouth 2 (Two) Times a Day. 60 tablet 0   • Cariprazine HCl (Vraylar) 3 MG capsule capsule Take 1 capsule by mouth Daily for 30 days. 30 capsule 0     No current facility-administered medications for this visit.       Review of Symptoms:    Review of Systems   Constitutional: Negative.    Psychiatric/Behavioral: Negative.  Positive for depressed mood and stress.         Physical Exam:   Due to the remote nature of this encounter (virtual encounter), vitals were unable to be obtained.  Height stated at 61 inches.  Weight stated at 296 pounds.      Physical Exam  Neurological:      Mental Status: She is alert.   Psychiatric:         Attention and Perception: Attention and perception normal. She does not perceive auditory or visual hallucinations.         Mood and Affect: Affect normal.         Speech: Speech normal.         Behavior: Behavior is cooperative.         Thought Content: Thought content normal. Thought content is not paranoid or delusional. Thought content does not include homicidal or suicidal ideation. Thought content does not include homicidal or suicidal plan.         Cognition and Memory: Cognition and memory normal.         Judgment: Judgment normal.      Comments: Sad mood           Mental Status Exam:   Hygiene:   good  Cooperation:  Cooperative  Eye Contact:  Good  Psychomotor Behavior:  Appropriate  Affect:  Appropriate  Mood: sad  Speech:  Normal  Thought Process:  Linear  Thought Content:  Normal  Suicidal:  None  Homicidal:  None  Hallucinations:  None  Delusion:  None  Memory:  Intact  Orientation:  Person, Place, Time and Situation  Reliability:  good  Insight:  Good  Judgement:  Good  Impulse Control:  Good  Physical/Medical Issues:  No          Previous Provider notes and available records reviewed by this APRN at today's encounter.         Lab Results:   No visits with results within 1 Month(s) from this visit.    Latest known visit with results is:   Admission on 12/01/2021, Discharged on 12/01/2021   Component Date Value Ref Range Status   • SARS-CoV-2 SCOTT 12/01/2021 Not Detected  Not Detected Final         Assessment/Plan   Problems Addressed this Visit    None     Visit Diagnoses     Bipolar I disorder, most recent episode depressed (HCC)  (Chronic)   -  Primary    Relevant Medications    busPIRone (BUSPAR) 15 MG tablet    Cariprazine HCl (Vraylar) 3 MG capsule capsule    Generalized anxiety disorder  (Chronic)       Relevant Medications    busPIRone (BUSPAR) 15 MG tablet    Cariprazine HCl (Vraylar) 3 MG capsule capsule    Amphetamine-type substance use disorder, moderate, in sustained remission (HCC)          Diagnoses       Codes Comments    Bipolar I disorder, most recent episode depressed (HCC)    -  Primary ICD-10-CM: F31.30  ICD-9-CM: 296.50     Generalized anxiety disorder     ICD-10-CM: F41.1  ICD-9-CM: 300.02     Amphetamine-type substance use disorder, moderate, in sustained remission (HCC)     ICD-10-CM: F15.21  ICD-9-CM: 304.43           Visit Diagnoses:    ICD-10-CM ICD-9-CM   1. Bipolar I disorder, most recent episode depressed (HCC)  F31.30 296.50   2. Generalized anxiety disorder  F41.1 300.02   3. Amphetamine-type substance use disorder, moderate, in sustained remission (HCC)  F15.21 304.43          GOALS:  Short Term Goals: Patient will be compliant with medication, and patient will have no significant medication related side effects.  Patient will be engaged in psychotherapy as indicated.  Patient will report subjective improvement of symptoms.  Long term goals: To stabilize mood and treat/improve subjective symptoms, the patient will stay out of the hospital, the patient will be at an optimal level of functioning, and the patient will take all medications as prescribed.  The patient verbalized understanding and agreement with goals that were mutually set.      TREATMENT PLAN:   -Increase Buspar to  15 mg PO BID for anxiety.   -Increase Vraylar to 3 mg PO Daily for bipolar disorder  -Pt is prescribed Suboxone from another provider    Medication and treatment options, both pharmacological and non-pharmacological treatment options, discussed during today's visit, including any off label use of medication. Patient acknowledged and verbally consented with current treatment plan and was educated on the importance of compliance with treatment and follow-up appointments.        MEDICATION ISSUES:    Discussed treatment plan and medication options of prescribed medication as well as the risks, benefits, any black box warnings, and side effects including potential falls, possible impaired driving, and metabolic adversities among others, including any off label use of medication. Patient is agreeable to call the office with any worsening of symptoms or onset of side effects, or if any concerns or questions arise.  The contact information for the office is made available to the patient. Patient is agreeable to call 911 or go to the nearest ER should they begin having any SI/HI, or if any urgent concerns arise. No medication side effects or related complaints today.       SUICIDE RISK ASSESSMENT: Unalterable demographics and a history of mental health intervention indicate this patient is in a high risk category compared to the general population. At present, the patient denies active SI/HI, intentions, or plans at this time and agrees to seek immediate care should such thoughts develop. The patient verbalizes understanding of how to access emergency care if needed and agrees to do so. Consideration of suicide risk and protective factors such as history, current presentation, individual strengths and weaknesses, psychosocial and environmental stressors and variables, psychiatric illness and symptoms, medical conditions and pain, took place in this interview. Based on those considerations, the patient is determined: within  individual baseline and presenting no imminent risk for suicide or homicide. Other recommendations: The patient does not meet the criteria for inpatient admission and is not a safety risk to self or others at today's visit. Inpatient treatment offers no significant advantages over outpatient treatment for this patient at today's visit.      SAFETY PLAN:  Patient was given ample time for questions and fully participated in treatment planning.  Patient was encouraged to call the clinic with any questions or concerns.  Patient was informed of access to emergency care. If patient were to develop any significant symptomatology, suicidal ideation, homicidal ideation, any concerns, or feel unsafe at any time they are to call the clinic and if unable to get immediate assistance should immediately call 911 or go to the nearest emergency room.  The patient is advised to remove or secure (lock away) all lethal weapons (including guns) and sharps (including razors, scissors, knives, etc.).  All medications (including any prescribed and any over the counter medications) should be stored in a safe and secured location that is not obtainable by children/adolescents.  Patient was given an opportunity and encouraged to ask questions about their medication, illness, and treatment. Patient contracted verbally for the following: If you are experiencing an emotional crisis or have thoughts of harming yourself or others, please go to your nearest local emergency room or call 911. Will continue to re-assess medication response and side effects frequently to establish efficacy and ensure safety. Risks, any black box warnings, side effects, off label usage, and benefits of medication and treatment discussed with patient, along with potential adverse side effects of current and/or newly prescribed medication, alternative treatment options, and OTC medications.  Patient verbalized understanding of potential risks, any off label use of  medication, any black box warnings, and any side effects in their own words. The patient verbalized understanding and agreed to comply with the safety plan discussed in their own words.  Patient given the number to the office. Number also available to the 24- hour suicide hotline.      MEDS ORDERED DURING VISIT:  New Medications Ordered This Visit   Medications   • busPIRone (BUSPAR) 15 MG tablet     Sig: Take 1 tablet by mouth 2 (Two) Times a Day.     Dispense:  60 tablet     Refill:  0   • Cariprazine HCl (Vraylar) 3 MG capsule capsule     Sig: Take 1 capsule by mouth Daily for 30 days.     Dispense:  30 capsule     Refill:  0       Return in about 4 weeks (around 5/17/2022), or if symptoms worsen or fail to improve, for Recheck.     Treatment plan completed: 2/23/22    Progress toward goal: Not at goal    Functional Status: Mild impairment     Prognosis: Good with Ongoing Treatment         This document has been electronically signed by JENNIFER Reed  April 19, 2022 12:44 EDT     Some of the data in this electronic note has been brought forward from a previous encounter, any necessary changes have been made, it has been reviewed by this APRN, and it is accurate.    Please note that portions of this note were completed with a voice recognition program. Efforts were made to edit dictation, but occasionally words are mistranscribed.

## 2022-05-18 ENCOUNTER — TELEMEDICINE (OUTPATIENT)
Dept: PSYCHIATRY | Facility: CLINIC | Age: 32
End: 2022-05-18

## 2022-05-18 DIAGNOSIS — F41.1 GENERALIZED ANXIETY DISORDER: Chronic | ICD-10-CM

## 2022-05-18 DIAGNOSIS — F31.30 BIPOLAR I DISORDER, MOST RECENT EPISODE DEPRESSED: Primary | Chronic | ICD-10-CM

## 2022-05-18 PROCEDURE — 99214 OFFICE O/P EST MOD 30 MIN: CPT | Performed by: NURSE PRACTITIONER

## 2022-05-18 RX ORDER — BUSPIRONE HYDROCHLORIDE 15 MG/1
15 TABLET ORAL 2 TIMES DAILY
Qty: 60 TABLET | Refills: 0 | Status: SHIPPED | OUTPATIENT
Start: 2022-05-18 | End: 2022-06-16 | Stop reason: SDUPTHER

## 2022-05-18 NOTE — PROGRESS NOTES
"This provider is located at the [facility] (through [facility]), [address], [place], [address] using a secure [employer] Video Visit through [employer]. Patient is being seen remotely via telehealth at their home address in [place], and stated they are in a secure environment for this session. The patient's condition being diagnosed/treated is appropriate for telemedicine. The provider identified herself as well as her credentials.   The patient, and/or patients guardian, consent to be seen remotely, and when consent is given they understand that the consent allows for patient identifiable information to be sent to a third party as needed.   They may refuse to be seen remotely at any time. The electronic data is encrypted and password protected, and the patient and/or guardian has been advised of the potential risks to privacy not withstanding such measures.    You have chosen to receive care through a telehealth visit.  Do you consent to use a video/audio connection for your medical care today? Yes        Subjective   Franca Ruiz is a 31 y.o. female who presents today for follow up    Chief Complaint:  Bipolar disorder, anxiety    Accompanied by: Pt was alone for duration of appointment    History of Present Illness:   Pt reports that she has been \"alright\" this past month. Pt likes the increase in medications better. Depression has \"greatly\" improved. Anxiety is more manageable. Pt continues to work and enjoys it fairly well. Pt has been stressed about her car situation. The  has had it for six weeks. She paid $1600 to have it fixed and it still keeps bringing up the same code. Pt currently has a rental for transportation. Pt believes that she is sleeping excessively; she is averaging 12 hours a day. She states it has been this way since she started second shift. She continues to use her C-pap. Per pt, there isn't availability on first shift. Appetite " fluctuates, she usually eats 1-2 times per day. The patient denies any new medical problems or changes in medications since last appointment with the exception of having carpal tunnel surgery. She may have the other one done if a few weeks. The patient reports compliance with current medication regimen. The patient denies any current side effects from her current medication regimen. The patient denies any abnormal muscle movements or tics. The patient rates their depression at a 2/10 on a 0-10 scale, with 10 being the worst. The patient rates their anxiety at a 2/10 on a 0-10 scale, with 10 being the worst. The patient would like to not adjust or change their medications at this visit. The patient denies any suicidal or homicidal ideations, plans, or intent at today's encounter and is convincing.  The patient denies any auditory hallucinations or visual hallucinations. The patient does not endorse any significant symptoms consistent with leidy or psychosis at today's encounter.     *If the patient has any concerns or needs assistance, they may call the [facility] at [phone]*        Prior Psychiatric Medications:  Wellbutrin XL: is helpful  Naltrexone: is helpful   Hydroxyzine: helps, but is sedating  Trazodone: takes PRN and is helpful  Buspar: unsure if effective  Depakote ER: may be beneficial  Paxil:   Zoloft  Celexa  Prozac: may have been helpful  Cymbalta: may have been helpful  Seroquel: side effects; too sedating  Abilify: sedating  Vraylar: helpful        The following portions of the patient's history were reviewed and updated as appropriate: allergies, current medications, past family history, past medical history, past social history, past surgical history and problem list.          Past Medical History:  Past Medical History:   Diagnosis Date   • Allergic    • Anxiety    • Arthritis    • Bilateral ovarian cysts    • Bipolar disorder (HCC)    • Chronic pain    •  Depression    • Obesity    • Sleep apnea    • Substance abuse ([facility])        Social History:  Social History     Socioeconomic History   • Marital status: Single   Tobacco Use   • Smoking status: Former Smoker     Packs/day: 1.00     Years: 10.00     Pack years: 10.00     Types: Cigarettes, Electronic Cigarette     Start date: 2007     Quit date: 2020     Years since quittin.3   • Smokeless tobacco: Never Used   Vaping Use   • Vaping Use: Every day   • Substances: Nicotine, Flavoring   Substance and Sexual Activity   • Alcohol use: Never   • Drug use: Not Currently     Types: Amphetamines, Benzodiazepines, Fentanyl, Hydrocodone, Marijuana, MDMA (ecstacy), Methamphetamines, Morphine, Oxycodone, PCP     Comment: 2021 (Last use)   • Sexual activity: Not Currently     Partners: Male     Birth control/protection: Abstinence, Condom       Family History:  Family History   Problem Relation Age of Onset   • Mental illness Mother    • Arthritis Father    • Hypertension Father    • Mental illness Father    • Obesity Father    • Bipolar disorder Father    • Alcohol abuse Father    • Kidney disease Paternal Aunt    • Mental illness Paternal Aunt    • Bipolar disorder Paternal Aunt    • Mental illness Paternal Uncle    • Sleep apnea Other    • Asthma Other    • COPD Other    • Restless legs syndrome Other        Past Surgical History:  Past Surgical History:   Procedure Laterality Date   • ANKLE SURGERY Right    • WISDOM TOOTH EXTRACTION         Problem List:  Patient Active Problem List   Diagnosis   • Moderate episode of recurrent major depressive disorder ([facility])   • Mild obstructive sleep apnea   • Bilateral carpal tunnel syndrome   • Morbid obesity with BMI of 50.0-59.9, adult ([facility])   • History of substance abuse ([facility])       Allergy:   Allergies   Allergen Reactions   • Augmentin [Amoxicillin-Pot Clavulanate] Other (See Comments)     Throat blisters   • Crow Flavor [Flavoring Agent] Unknown - Low  Severity   • Vantin [Cefpodoxime] Other (See Comments)     Throat blisters        Current Medications:   Current Outpatient Medications   Medication Sig Dispense Refill   • busPIRone (BUSPAR) 15 MG tablet Take 1 tablet by mouth 2 (Two) Times a Day. 60 tablet 0   • Cariprazine HCl (Vraylar) 3 MG capsule capsule Take 1 capsule by mouth Daily. 30 capsule 0   • buprenorphine-naloxone (SUBOXONE) 8-2 MG per SL tablet        No current facility-administered medications for this visit.       Review of Symptoms:    Review of Systems   Constitutional: Positive for appetite change.   Psychiatric/Behavioral: Positive for sleep disturbance and stress.         Physical Exam:   Due to the remote nature of this encounter (virtual encounter), vitals were unable to be obtained.  Height stated at 61 inches.  Weight stated at 296 pounds.      Physical Exam  Neurological:      Mental Status: She is alert.   Psychiatric:         Attention and Perception: Attention and perception normal. She does not perceive auditory or visual hallucinations.         Mood and Affect: Mood and affect normal.         Speech: Speech normal.         Behavior: Behavior normal. Behavior is cooperative.         Thought Content: Thought content normal. Thought content is not paranoid or delusional. Thought content does not include homicidal or suicidal ideation. Thought content does not include homicidal or suicidal plan.         Cognition and Memory: Cognition and memory normal.         Judgment: Judgment normal.           Mental Status Exam:   Hygiene:   good  Cooperation:  Cooperative  Eye Contact:  Good  Psychomotor Behavior:  Appropriate  Affect:  Appropriate  Mood: normal  Speech:  Normal  Thought Process:  Linear  Thought Content:  Normal  Suicidal:  None  Homicidal:  None  Hallucinations:  None  Delusion:  None  Memory:  Intact  Orientation:  Person, Place, Time and Situation  Reliability:  good  Insight:  Good  Judgement:  Good  Impulse Control:   Good  Physical/Medical Issues:  No          Previous Provider notes and available records reviewed by this APRN at today's encounter.         Lab Results:   No visits with results within 1 Month(s) from this visit.   Latest known visit with results is:   Admission on 12/01/2021, Discharged on 12/01/2021   Component Date Value Ref Range Status   • SARS-CoV-2 SCOTT 12/01/2021 Not Detected  Not Detected Final         Assessment & Plan   Problems Addressed this Visit    None     Visit Diagnoses     Bipolar I disorder, most recent episode depressed (HCC)  (Chronic)   -  Primary    Relevant Medications    Cariprazine HCl (Vraylar) 3 MG capsule capsule    busPIRone (BUSPAR) 15 MG tablet    Generalized anxiety disorder  (Chronic)       Relevant Medications    Cariprazine HCl (Vraylar) 3 MG capsule capsule    busPIRone (BUSPAR) 15 MG tablet      Diagnoses       Codes Comments    Bipolar I disorder, most recent episode depressed (HCC)    -  Primary ICD-10-CM: F31.30  ICD-9-CM: 296.50     Generalized anxiety disorder     ICD-10-CM: F41.1  ICD-9-CM: 300.02           Visit Diagnoses:    ICD-10-CM ICD-9-CM   1. Bipolar I disorder, most recent episode depressed (HCC)  F31.30 296.50   2. Generalized anxiety disorder  F41.1 300.02          GOALS:  Short Term Goals: Patient will be compliant with medication, and patient will have no significant medication related side effects.  Patient will be engaged in psychotherapy as indicated.  Patient will report subjective improvement of symptoms.  Long term goals: To stabilize mood and treat/improve subjective symptoms, the patient will stay out of the hospital, the patient will be at an optimal level of functioning, and the patient will take all medications as prescribed.  The patient verbalized understanding and agreement with goals that were mutually set.      TREATMENT PLAN:   -Continue Buspar 15 mg PO BID for anxiety.   -Continue Vraylar 3 mg PO Daily for bipolar disorder  -Pt is  prescribed Suboxone from another provider    Medication and treatment options, both pharmacological and non-pharmacological treatment options, discussed during today's visit, including any off label use of medication. Patient acknowledged and verbally consented with current treatment plan and was educated on the importance of compliance with treatment and follow-up appointments.        MEDICATION ISSUES:    Discussed treatment plan and medication options of prescribed medication as well as the risks, benefits, any black box warnings, and side effects including potential falls, possible impaired driving, and metabolic adversities among others, including any off label use of medication. Patient is agreeable to call the office with any worsening of symptoms or onset of side effects, or if any concerns or questions arise.  The contact information for the office is made available to the patient. Patient is agreeable to call 911 or go to the nearest ER should they begin having any SI/HI, or if any urgent concerns arise. No medication side effects or related complaints today.       SUICIDE RISK ASSESSMENT: Unalterable demographics and a history of mental health intervention indicate this patient is in a high risk category compared to the general population. At present, the patient denies active SI/HI, intentions, or plans at this time and agrees to seek immediate care should such thoughts develop. The patient verbalizes understanding of how to access emergency care if needed and agrees to do so. Consideration of suicide risk and protective factors such as history, current presentation, individual strengths and weaknesses, psychosocial and environmental stressors and variables, psychiatric illness and symptoms, medical conditions and pain, took place in this interview. Based on those considerations, the patient is determined: within individual baseline and presenting no imminent risk for suicide or homicide. Other  recommendations: The patient does not meet the criteria for inpatient admission and is not a safety risk to self or others at today's visit. Inpatient treatment offers no significant advantages over outpatient treatment for this patient at today's visit.      SAFETY PLAN:  Patient was given ample time for questions and fully participated in treatment planning.  Patient was encouraged to call the clinic with any questions or concerns.  Patient was informed of access to emergency care. If patient were to develop any significant symptomatology, suicidal ideation, homicidal ideation, any concerns, or feel unsafe at any time they are to call the clinic and if unable to get immediate assistance should immediately call 911 or go to the nearest emergency room.  The patient is advised to remove or secure (lock away) all lethal weapons (including guns) and sharps (including razors, scissors, knives, etc.).  All medications (including any prescribed and any over the counter medications) should be stored in a safe and secured location that is not obtainable by children/adolescents.  Patient was given an opportunity and encouraged to ask questions about their medication, illness, and treatment. Patient contracted verbally for the following: If you are experiencing an emotional crisis or have thoughts of harming yourself or others, please go to your nearest local emergency room or call 911. Will continue to re-assess medication response and side effects frequently to establish efficacy and ensure safety. Risks, any black box warnings, side effects, off label usage, and benefits of medication and treatment discussed with patient, along with potential adverse side effects of current and/or newly prescribed medication, alternative treatment options, and OTC medications.  Patient verbalized understanding of potential risks, any off label use of medication, any black box warnings, and any side effects in their own words. The patient  verbalized understanding and agreed to comply with the safety plan discussed in their own words.  Patient given the number to the office. Number also available to the 24- hour suicide hotline.      MEDS ORDERED DURING VISIT:  New Medications Ordered This Visit   Medications   • Cariprazine HCl (Vraylar) 3 MG capsule capsule     Sig: Take 1 capsule by mouth Daily.     Dispense:  30 capsule     Refill:  0   • busPIRone (BUSPAR) 15 MG tablet     Sig: Take 1 tablet by mouth 2 (Two) Times a Day.     Dispense:  60 tablet     Refill:  0       Return in about 4 weeks (around 6/15/2022), or if symptoms worsen or fail to improve, for Recheck.     Treatment plan completed: 2/23/22    Progress toward goal: Not at goal    Functional Status: Mild impairment     Prognosis: Good with Ongoing Treatment         This document has been electronically signed by JENNIFER Reed  May 18, 2022 12:43 EDT     Some of the data in this electronic note has been brought forward from a previous encounter, any necessary changes have been made, it has been reviewed by this APRN, and it is accurate.    Please note that portions of this note were completed with a voice recognition program. Efforts were made to edit dictation, but occasionally words are mistranscribed.

## 2022-06-16 DIAGNOSIS — F41.1 GENERALIZED ANXIETY DISORDER: Chronic | ICD-10-CM

## 2022-06-16 DIAGNOSIS — F31.30 BIPOLAR I DISORDER, MOST RECENT EPISODE DEPRESSED: Chronic | ICD-10-CM

## 2022-06-16 RX ORDER — BUSPIRONE HYDROCHLORIDE 15 MG/1
15 TABLET ORAL 2 TIMES DAILY
Qty: 60 TABLET | Refills: 0 | Status: SHIPPED | OUTPATIENT
Start: 2022-06-16 | End: 2022-07-19 | Stop reason: SDUPTHER

## 2022-07-19 ENCOUNTER — TELEMEDICINE (OUTPATIENT)
Dept: PSYCHIATRY | Facility: CLINIC | Age: 32
End: 2022-07-19

## 2022-07-19 DIAGNOSIS — F41.1 GENERALIZED ANXIETY DISORDER: Chronic | ICD-10-CM

## 2022-07-19 DIAGNOSIS — F31.30 BIPOLAR I DISORDER, MOST RECENT EPISODE DEPRESSED: Primary | Chronic | ICD-10-CM

## 2022-07-19 PROCEDURE — 99214 OFFICE O/P EST MOD 30 MIN: CPT | Performed by: NURSE PRACTITIONER

## 2022-07-19 RX ORDER — BUSPIRONE HYDROCHLORIDE 15 MG/1
15 TABLET ORAL 2 TIMES DAILY
Qty: 60 TABLET | Refills: 0 | Status: SHIPPED | OUTPATIENT
Start: 2022-07-19 | End: 2022-08-16 | Stop reason: SDUPTHER

## 2022-07-19 RX ORDER — BUPROPION HYDROCHLORIDE 150 MG/1
TABLET ORAL
Qty: 30 TABLET | Refills: 0 | Status: SHIPPED | OUTPATIENT
Start: 2022-07-19 | End: 2022-08-16 | Stop reason: DRUGHIGH

## 2022-07-19 NOTE — PROGRESS NOTES
This provider is located at the [facility] (through [facility]), [address], [place], [address] using a secure [employer] Video Visit through [employer]. Patient is being seen remotely via telehealth at their home address in [place], and stated they are in a secure environment for this session. The patient's condition being diagnosed/treated is appropriate for telemedicine. The provider identified herself as well as her credentials.   The patient, and/or patients guardian, consent to be seen remotely, and when consent is given they understand that the consent allows for patient identifiable information to be sent to a third party as needed.   They may refuse to be seen remotely at any time. The electronic data is encrypted and password protected, and the patient and/or guardian has been advised of the potential risks to privacy not withstanding such measures.    You have chosen to receive care through a telehealth visit.  Do you consent to use a video/audio connection for your medical care today? Yes        Subjective   Franca Ruiz is a 32 y.o. female who presents today for follow up    Chief Complaint:  Bipolar disorder, anxiety    Accompanied by: Pt was alone for duration of appointment    History of Present Illness:   Pt reports that she is doing okay. Pt finally got her car fixed and has transportation now. Pt continues to work 40 hours a week at her job. Pt hasn't been feeling motivated to do much. Pt has been sleeping about 14 hours a day. Pt has been feeling depressed for the past few weeks. Pt works 2nd shift (3-11:30 PM). Appetite has been poor to fair. Pt admits that she misses the second dose of Buspar on occasion. Pt recently had surgery on her right hand for carpal tunnel. Pt continues to recover and just got off of light duty this past week. The patient denies any other new medical problems or changes in medications since last appointment with this  facility. The patient denies any current side effects from their current medication regimen. The patient denies any abnormal muscle movements or tics. The patient rates their depression at a 7-8/10 on a 0-10 scale, with 10 being the worst. The patient rates their anxiety at a 3-4/10 on a 0-10 scale, with 10 being the worst. The patient would like to adjust their medications at this visit. The patient denies any suicidal or homicidal ideations, plans, or intent at today's encounter and is convincing. The patient denies any auditory hallucinations or visual hallucinations. The patient does not endorse any significant symptoms consistent with leidy or psychosis at today's encounter.     *If the patient has any concerns or needs assistance, they may call the [facility] at [phone]*        Prior Psychiatric Medications:  Wellbutrin XL: is helpful  Naltrexone: is helpful   Hydroxyzine: helps, but is sedating  Trazodone: takes PRN and is helpful  Buspar: unsure if effective  Depakote ER: may be beneficial  Paxil:   Zoloft  Celexa  Prozac: may have been helpful  Cymbalta: may have been helpful  Seroquel: side effects; too sedating  Abilify: sedating  Vraylar: helpful        The following portions of the patient's history were reviewed and updated as appropriate: allergies, current medications, past family history, past medical history, past social history, past surgical history and problem list.          Past Medical History:  Past Medical History:   Diagnosis Date   • Allergic    • Anxiety    • Arthritis    • Bilateral ovarian cysts    • Bipolar disorder (HCC)    • Chronic pain    • Depression    • Obesity    • Sleep apnea    • Substance abuse (HCC)        Social History:  Social History     Socioeconomic History   • Marital status: Single   Tobacco Use   • Smoking status: Former Smoker     Packs/day: 1.00     Years: 10.00     Pack years: 10.00     Types: Cigarettes, Electronic Cigarette      Start date: 2007     Quit date: 2020     Years since quittin.5   • Smokeless tobacco: Never Used   Vaping Use   • Vaping Use: Every day   • Substances: Nicotine, Flavoring   Substance and Sexual Activity   • Alcohol use: Never   • Drug use: Not Currently     Types: Amphetamines, Benzodiazepines, Fentanyl, Hydrocodone, Marijuana, MDMA (ecstacy), Methamphetamines, Morphine, Oxycodone, PCP     Comment: 2021 (Last use)   • Sexual activity: Not Currently     Partners: Male     Birth control/protection: Abstinence, Condom       Family History:  Family History   Problem Relation Age of Onset   • Mental illness Mother    • Arthritis Father    • Hypertension Father    • Mental illness Father    • Obesity Father    • Bipolar disorder Father    • Alcohol abuse Father    • Kidney disease Paternal Aunt    • Mental illness Paternal Aunt    • Bipolar disorder Paternal Aunt    • Mental illness Paternal Uncle    • Sleep apnea Other    • Asthma Other    • COPD Other    • Restless legs syndrome Other        Past Surgical History:  Past Surgical History:   Procedure Laterality Date   • ANKLE SURGERY Right    • WISDOM TOOTH EXTRACTION         Problem List:  Patient Active Problem List   Diagnosis   • Moderate episode of recurrent major depressive disorder ([facility])   • Mild obstructive sleep apnea   • Bilateral carpal tunnel syndrome   • Morbid obesity with BMI of 50.0-59.9, adult ([facility])   • History of substance abuse ([facility])       Allergy:   Allergies   Allergen Reactions   • Augmentin [Amoxicillin-Pot Clavulanate] Other (See Comments)     Throat blisters   • Cogswell Flavor [Flavoring Agent] Unknown - Low Severity   • Vantin [Cefpodoxime] Other (See Comments)     Throat blisters        Current Medications:   Current Outpatient Medications   Medication Sig Dispense Refill   • busPIRone (BUSPAR) 15 MG tablet Take 1 tablet by mouth 2 (Two) Times a Day. 60 tablet 0   • Cariprazine HCl (Vraylar) 3 MG capsule capsule Take 1  capsule by mouth Daily. 30 capsule 0   • buprenorphine-naloxone (SUBOXONE) 8-2 MG per SL tablet      • buPROPion XL (Wellbutrin XL) 150 MG 24 hr tablet Take 1 tablet PO QAM 30 tablet 0     No current facility-administered medications for this visit.       Review of Symptoms:    Review of Systems   Constitutional: Positive for appetite change.   Psychiatric/Behavioral: Positive for sleep disturbance, depressed mood and stress.         Physical Exam:   Due to the remote nature of this encounter (virtual encounter), vitals were unable to be obtained.  Height stated at 61 inches.  Weight stated at 296 pounds.        Physical Exam  Neurological:      Mental Status: She is alert.   Psychiatric:         Attention and Perception: Attention and perception normal.         Mood and Affect: Affect normal. Mood is depressed.         Speech: Speech normal.         Behavior: Behavior normal. Behavior is cooperative.         Thought Content: Thought content normal. Thought content is not paranoid or delusional. Thought content does not include homicidal or suicidal ideation. Thought content does not include homicidal or suicidal plan.         Cognition and Memory: Cognition and memory normal.         Judgment: Judgment normal.           Mental Status Exam:   Hygiene:   good  Cooperation:  Cooperative  Eye Contact:  Good  Psychomotor Behavior:  Appropriate  Affect:  Appropriate  Mood: depressed  Speech:  Normal  Thought Process:  Linear  Thought Content:  Normal  Suicidal:  None  Homicidal:  None  Hallucinations:  None  Delusion:  None  Memory:  Intact  Orientation:  Person, Place, Time and Situation  Reliability:  good  Insight:  Good  Judgement:  Good  Impulse Control:  Good  Physical/Medical Issues:  No          Previous Provider notes and available records reviewed by this APRN at today's encounter.         Lab Results:   No visits with results within 1 Month(s) from this visit.   Latest known visit with results is:   Admission  on 12/01/2021, Discharged on 12/01/2021   Component Date Value Ref Range Status   • SARS-CoV-2 SCOTT 12/01/2021 Not Detected  Not Detected Final         Assessment & Plan   Problems Addressed this Visit    None     Visit Diagnoses     Bipolar I disorder, most recent episode depressed (HCC)  (Chronic)   -  Primary    Relevant Medications    Cariprazine HCl (Vraylar) 3 MG capsule capsule    busPIRone (BUSPAR) 15 MG tablet    buPROPion XL (Wellbutrin XL) 150 MG 24 hr tablet    Generalized anxiety disorder  (Chronic)       Relevant Medications    Cariprazine HCl (Vraylar) 3 MG capsule capsule    busPIRone (BUSPAR) 15 MG tablet    buPROPion XL (Wellbutrin XL) 150 MG 24 hr tablet      Diagnoses       Codes Comments    Bipolar I disorder, most recent episode depressed (HCC)    -  Primary ICD-10-CM: F31.30  ICD-9-CM: 296.50     Generalized anxiety disorder     ICD-10-CM: F41.1  ICD-9-CM: 300.02           Visit Diagnoses:    ICD-10-CM ICD-9-CM   1. Bipolar I disorder, most recent episode depressed (HCC)  F31.30 296.50   2. Generalized anxiety disorder  F41.1 300.02          GOALS:  Short Term Goals: Patient will be compliant with medication, and patient will have no significant medication related side effects.  Patient will be engaged in psychotherapy as indicated.  Patient will report subjective improvement of symptoms.  Long term goals: To stabilize mood and treat/improve subjective symptoms, the patient will stay out of the hospital, the patient will be at an optimal level of functioning, and the patient will take all medications as prescribed.  The patient verbalized understanding and agreement with goals that were mutually set.      TREATMENT PLAN:   -Continue Buspar 15 mg PO BID for anxiety.   -Continue Vraylar 3 mg PO Daily for bipolar disorder  -Pt is prescribed Suboxone from another provider  -Start Wellbutrin  mg PO QAM for bipolar depression    *Discussed the importance of staying on Vraylar while taking  Wellbutrin XL. Pt to call if she experiences any symptoms of leidy/hypomania. Pt has previously done well on this combination of medication. Pt verbalized understanding*    Medication and treatment options, both pharmacological and non-pharmacological treatment options, discussed during today's visit, including any off label use of medication. Patient acknowledged and verbally consented with current treatment plan and was educated on the importance of compliance with treatment and follow-up appointments.        MEDICATION ISSUES:    Discussed treatment plan and medication options of prescribed medication as well as the risks, benefits, any black box warnings, and side effects including potential falls, possible impaired driving, and metabolic adversities among others, including any off label use of medication. Patient is agreeable to call the office with any worsening of symptoms or onset of side effects, or if any concerns or questions arise.  The contact information for the office is made available to the patient. Patient is agreeable to call 911 or go to the nearest ER should they begin having any SI/HI, or if any urgent concerns arise. No medication side effects or related complaints today.       SUICIDE RISK ASSESSMENT: Unalterable demographics and a history of mental health intervention indicate this patient is in a high risk category compared to the general population. At present, the patient denies active SI/HI, intentions, or plans at this time and agrees to seek immediate care should such thoughts develop. The patient verbalizes understanding of how to access emergency care if needed and agrees to do so. Consideration of suicide risk and protective factors such as history, current presentation, individual strengths and weaknesses, psychosocial and environmental stressors and variables, psychiatric illness and symptoms, medical conditions and pain, took place in this interview. Based on those  considerations, the patient is determined: within individual baseline and presenting no imminent risk for suicide or homicide. Other recommendations: The patient does not meet the criteria for inpatient admission and is not a safety risk to self or others at today's visit. Inpatient treatment offers no significant advantages over outpatient treatment for this patient at today's visit.      SAFETY PLAN:  Patient was given ample time for questions and fully participated in treatment planning.  Patient was encouraged to call the clinic with any questions or concerns.  Patient was informed of access to emergency care. If patient were to develop any significant symptomatology, suicidal ideation, homicidal ideation, any concerns, or feel unsafe at any time they are to call the clinic and if unable to get immediate assistance should immediately call 911 or go to the nearest emergency room.  The patient is advised to remove or secure (lock away) all lethal weapons (including guns) and sharps (including razors, scissors, knives, etc.).  All medications (including any prescribed and any over the counter medications) should be stored in a safe and secured location that is not obtainable by children/adolescents.  Patient was given an opportunity and encouraged to ask questions about their medication, illness, and treatment. Patient contracted verbally for the following: If you are experiencing an emotional crisis or have thoughts of harming yourself or others, please go to your nearest local emergency room or call 911. Will continue to re-assess medication response and side effects frequently to establish efficacy and ensure safety. Risks, any black box warnings, side effects, off label usage, and benefits of medication and treatment discussed with patient, along with potential adverse side effects of current and/or newly prescribed medication, alternative treatment options, and OTC medications.  Patient verbalized  understanding of potential risks, any off label use of medication, any black box warnings, and any side effects in their own words. The patient verbalized understanding and agreed to comply with the safety plan discussed in their own words.  Patient given the number to the office. Number also available to the 24- hour suicide hotline.      MEDS ORDERED DURING VISIT:  New Medications Ordered This Visit   Medications   • Cariprazine HCl (Vraylar) 3 MG capsule capsule     Sig: Take 1 capsule by mouth Daily.     Dispense:  30 capsule     Refill:  0   • busPIRone (BUSPAR) 15 MG tablet     Sig: Take 1 tablet by mouth 2 (Two) Times a Day.     Dispense:  60 tablet     Refill:  0   • buPROPion XL (Wellbutrin XL) 150 MG 24 hr tablet     Sig: Take 1 tablet PO QAM     Dispense:  30 tablet     Refill:  0       Return in about 4 weeks (around 8/16/2022), or if symptoms worsen or fail to improve, for Recheck.     Treatment plan completed: 2/23/22    Progress toward goal: Not at goal    Functional Status: Mild impairment     Prognosis: Good with Ongoing Treatment         This document has been electronically signed by JENNIFER Reed  July 19, 2022 14:26 EDT     Some of the data in this electronic note has been brought forward from a previous encounter, any necessary changes have been made, it has been reviewed by this APRN, and it is accurate.    Please note that portions of this note were completed with a voice recognition program. Efforts were made to edit dictation, but occasionally words are mistranscribed.

## 2022-08-16 ENCOUNTER — TELEMEDICINE (OUTPATIENT)
Dept: PSYCHIATRY | Facility: CLINIC | Age: 32
End: 2022-08-16

## 2022-08-16 DIAGNOSIS — F31.30 BIPOLAR I DISORDER, MOST RECENT EPISODE DEPRESSED: Primary | Chronic | ICD-10-CM

## 2022-08-16 DIAGNOSIS — F41.1 GENERALIZED ANXIETY DISORDER: Chronic | ICD-10-CM

## 2022-08-16 PROCEDURE — 99214 OFFICE O/P EST MOD 30 MIN: CPT | Performed by: NURSE PRACTITIONER

## 2022-08-16 RX ORDER — BUPROPION HYDROCHLORIDE 300 MG/1
300 TABLET ORAL EVERY MORNING
Qty: 30 TABLET | Refills: 1 | Status: SHIPPED | OUTPATIENT
Start: 2022-08-16 | End: 2022-10-31 | Stop reason: SDUPTHER

## 2022-08-16 RX ORDER — BUSPIRONE HYDROCHLORIDE 15 MG/1
15 TABLET ORAL 2 TIMES DAILY
Qty: 60 TABLET | Refills: 1 | Status: SHIPPED | OUTPATIENT
Start: 2022-08-16 | End: 2022-11-14 | Stop reason: SDUPTHER

## 2022-08-16 NOTE — PROGRESS NOTES
"This provider is located at the [facility] (through [facility]), [address], [facility], [address] using a secure [employer] Video Visit through [employer]. Patient is being seen remotely via telehealth at their home address in [place], and stated they are in a secure environment for this session. The patient's condition being diagnosed/treated is appropriate for telemedicine. The provider identified herself as well as her credentials.   The patient, and/or patients guardian, consent to be seen remotely, and when consent is given they understand that the consent allows for patient identifiable information to be sent to a third party as needed.   They may refuse to be seen remotely at any time. The electronic data is encrypted and password protected, and the patient and/or guardian has been advised of the potential risks to privacy not withstanding such measures.    You have chosen to receive care through a telehealth visit.  Do you consent to use a video/audio connection for your medical care today? Yes        Subjective   Franca Ruiz is a 32 y.o. female who presents today for follow up    Chief Complaint:  Bipolar disorder, anxiety    Accompanied by: Pt was alone for duration of appointment    History of Present Illness:   Pt states she is doing \"a little better\" than she was last month. She isn't sleeping as much but is still sleeping 10-12 hours a day. Pt's shift is getting ready to change to first shift; this starts on Monday. Pt's car is out of shop and she no longer has transportation issues. Appetite is fair - due to her current shift, she is only eating once a day. Per pt, she decided to sign up for school, but failed the first exams. Pt quickly withdrew and thought about using illicit substances. Pt states she came close but resisted. Pt reports she has difficulty retaining information and concentrating. Pt feels this could be related to past drug use. Pt did well " in school when she was younger. Pt is interested in increasing the Wellbutrin XL to help with depressive symptoms. Pt continues to attend meetings at the [facility] once a week. She finds them helpful. Pt states she will call if she needs anything. If she thinks about using again she has someone she can call - a friend, [facility] clinic, our office, etc. The patient denies any new medical problems or changes in medications since last appointment with this facility. The patient reports compliance with current medication regimen. The patient denies any current side effects from their current medication regimen. The patient denies any abnormal muscle movements or tics. The patient rates their depression at a 7/10 on a 0-10 scale, with 10 being the worst. The patient rates their anxiety at an 8/10 on a 0-10 scale, with 10 being the worst. Pt believes the anxiety and depression is related to the failed exams. The patient would like to increase their medications at this visit. The patient denies any suicidal or homicidal ideations, plans, or intent at today's encounter and is convincing. The patient denies any auditory hallucinations or visual hallucinations. The patient does not endorse any significant symptoms consistent with leidy or psychosis at today's encounter.     *If the patient has any concerns or needs assistance, they may call the [facility] at [phone]*          Prior Psychiatric Medications:  Wellbutrin XL: is helpful  Naltrexone: is helpful   Hydroxyzine: helps, but is sedating  Trazodone: takes PRN and is helpful  Buspar: unsure if effective  Depakote ER: may be beneficial  Paxil:   Zoloft  Celexa  Prozac: may have been helpful  Cymbalta: may have been helpful  Seroquel: side effects; too sedating  Abilify: sedating  Vraylar: helpful        The following portions of the patient's history were reviewed and updated as appropriate: allergies, current medications, past  family history, past medical history, past social history, past surgical history and problem list.          Past Medical History:  Past Medical History:   Diagnosis Date   • Allergic    • Anxiety    • Arthritis    • Bilateral ovarian cysts    • Bipolar disorder (HCC)    • Chronic pain    • Depression    • Obesity    • Sleep apnea    • Substance abuse (HCC)        Social History:  Social History     Socioeconomic History   • Marital status: Single   Tobacco Use   • Smoking status: Former Smoker     Packs/day: 1.00     Years: 10.00     Pack years: 10.00     Types: Cigarettes, Electronic Cigarette     Start date: 2007     Quit date: 2020     Years since quittin.6   • Smokeless tobacco: Never Used   Vaping Use   • Vaping Use: Every day   • Substances: Nicotine, Flavoring   Substance and Sexual Activity   • Alcohol use: Never   • Drug use: Not Currently     Types: Amphetamines, Benzodiazepines, Fentanyl, Hydrocodone, Marijuana, MDMA (ecstacy), Methamphetamines, Morphine, Oxycodone, PCP     Comment: 2021 (Last use)   • Sexual activity: Not Currently     Partners: Male     Birth control/protection: Abstinence, Condom       Family History:  Family History   Problem Relation Age of Onset   • Mental illness Mother    • Arthritis Father    • Hypertension Father    • Mental illness Father    • Obesity Father    • Bipolar disorder Father    • Alcohol abuse Father    • Kidney disease Paternal Aunt    • Mental illness Paternal Aunt    • Bipolar disorder Paternal Aunt    • Mental illness Paternal Uncle    • Sleep apnea Other    • Asthma Other    • COPD Other    • Restless legs syndrome Other        Past Surgical History:  Past Surgical History:   Procedure Laterality Date   • ANKLE SURGERY Right    • WISDOM TOOTH EXTRACTION         Problem List:  Patient Active Problem List   Diagnosis   • Moderate episode of recurrent major depressive disorder (HCC)   • Mild obstructive sleep apnea   • Bilateral carpal  tunnel syndrome   • Morbid obesity with BMI of 50.0-59.9, adult ([facility])   • History of substance abuse ([facility])       Allergy:   Allergies   Allergen Reactions   • Augmentin [Amoxicillin-Pot Clavulanate] Other (See Comments)     Throat blisters   • Crow Flavor [Flavoring Agent] Unknown - Low Severity   • Vantin [Cefpodoxime] Other (See Comments)     Throat blisters        Current Medications:   Current Outpatient Medications   Medication Sig Dispense Refill   • busPIRone (BUSPAR) 15 MG tablet Take 1 tablet by mouth 2 (Two) Times a Day. 60 tablet 1   • Cariprazine HCl (Vraylar) 3 MG capsule capsule Take 1 capsule by mouth Daily. 30 capsule 1   • buprenorphine-naloxone (SUBOXONE) 8-2 MG per SL tablet      • buPROPion XL (Wellbutrin XL) 300 MG 24 hr tablet Take 1 tablet by mouth Every Morning. 30 tablet 1     No current facility-administered medications for this visit.       Review of Symptoms:    Review of Systems   Constitutional: Positive for appetite change and fatigue.   Psychiatric/Behavioral: Positive for decreased concentration, sleep disturbance, depressed mood and stress. The patient is nervous/anxious.          Physical Exam:   Due to the remote nature of this encounter (virtual encounter), vitals were unable to be obtained.  Height stated at 61 inches.  Weight stated at 280 pounds.        Physical Exam  Neurological:      Mental Status: She is alert.   Psychiatric:         Attention and Perception: Attention and perception normal.         Mood and Affect: Mood is depressed. Affect is tearful.         Speech: Speech normal.         Behavior: Behavior normal. Behavior is cooperative.         Thought Content: Thought content normal. Thought content is not paranoid or delusional. Thought content does not include homicidal or suicidal ideation. Thought content does not include homicidal or suicidal plan.         Cognition and Memory: Cognition and memory normal.         Judgment: Judgment normal.           Mental  Status Exam:   Hygiene:   good  Cooperation:  Cooperative  Eye Contact:  Good  Psychomotor Behavior:  Appropriate  Affect:  Appropriate  Mood: depressed  Speech:  Normal  Thought Process:  Goal directed  Thought Content:  Normal  Suicidal:  None  Homicidal:  None  Hallucinations:  None  Delusion:  None  Memory:  Intact  Orientation:  Person, Place, Time and Situation  Reliability:  good  Insight:  Good  Judgement:  Good  Impulse Control:  Good  Physical/Medical Issues:  No          Previous Provider notes and available records reviewed by this APRN at today's encounter.         Lab Results:   No visits with results within 1 Month(s) from this visit.   Latest known visit with results is:   Admission on 12/01/2021, Discharged on 12/01/2021   Component Date Value Ref Range Status   • SARS-CoV-2 SCOTT 12/01/2021 Not Detected  Not Detected Final         Assessment & Plan   Problems Addressed this Visit    None     Visit Diagnoses     Bipolar I disorder, most recent episode depressed (HCC)  (Chronic)   -  Primary    Relevant Medications    buPROPion XL (Wellbutrin XL) 300 MG 24 hr tablet    Cariprazine HCl (Vraylar) 3 MG capsule capsule    busPIRone (BUSPAR) 15 MG tablet    Generalized anxiety disorder  (Chronic)       Relevant Medications    buPROPion XL (Wellbutrin XL) 300 MG 24 hr tablet    Cariprazine HCl (Vraylar) 3 MG capsule capsule    busPIRone (BUSPAR) 15 MG tablet      Diagnoses       Codes Comments    Bipolar I disorder, most recent episode depressed (HCC)    -  Primary ICD-10-CM: F31.30  ICD-9-CM: 296.50     Generalized anxiety disorder     ICD-10-CM: F41.1  ICD-9-CM: 300.02           Visit Diagnoses:    ICD-10-CM ICD-9-CM   1. Bipolar I disorder, most recent episode depressed (HCC)  F31.30 296.50   2. Generalized anxiety disorder  F41.1 300.02          GOALS:  Short Term Goals: Patient will be compliant with medication, and patient will have no significant medication related side effects.  Patient will be  engaged in psychotherapy as indicated.  Patient will report subjective improvement of symptoms.  Long term goals: To stabilize mood and treat/improve subjective symptoms, the patient will stay out of the hospital, the patient will be at an optimal level of functioning, and the patient will take all medications as prescribed.  The patient verbalized understanding and agreement with goals that were mutually set.      TREATMENT PLAN:   -Continue Buspar 15 mg PO BID for anxiety.   -Continue Vraylar 3 mg PO Daily for bipolar disorder  -Pt is prescribed Suboxone from another provider  -Increase Wellbutrin XL to 300 mg PO QAM for bipolar depression    *Discussed the importance of staying on Vraylar while taking Wellbutrin XL. Pt to call if she experiences any symptoms of leidy/hypomania. Pt has previously done well on this combination of medication. Pt verbalized understanding*    Medication and treatment options, both pharmacological and non-pharmacological treatment options, discussed during today's visit, including any off label use of medication. Patient acknowledged and verbally consented with current treatment plan and was educated on the importance of compliance with treatment and follow-up appointments.        MEDICATION ISSUES:    Discussed treatment plan and medication options of prescribed medication as well as the risks, benefits, any black box warnings, and side effects including potential falls, possible impaired driving, and metabolic adversities among others, including any off label use of medication. Patient is agreeable to call the office with any worsening of symptoms or onset of side effects, or if any concerns or questions arise.  The contact information for the office is made available to the patient. Patient is agreeable to call 911 or go to the nearest ER should they begin having any SI/HI, or if any urgent concerns arise. No medication side effects or related complaints today.       SUICIDE RISK  ASSESSMENT: Unalterable demographics and a history of mental health intervention indicate this patient is in a high risk category compared to the general population. At present, the patient denies active SI/HI, intentions, or plans at this time and agrees to seek immediate care should such thoughts develop. The patient verbalizes understanding of how to access emergency care if needed and agrees to do so. Consideration of suicide risk and protective factors such as history, current presentation, individual strengths and weaknesses, psychosocial and environmental stressors and variables, psychiatric illness and symptoms, medical conditions and pain, took place in this interview. Based on those considerations, the patient is determined: within individual baseline and presenting no imminent risk for suicide or homicide. Other recommendations: The patient does not meet the criteria for inpatient admission and is not a safety risk to self or others at today's visit. Inpatient treatment offers no significant advantages over outpatient treatment for this patient at today's visit.      SAFETY PLAN:  Patient was given ample time for questions and fully participated in treatment planning.  Patient was encouraged to call the clinic with any questions or concerns.  Patient was informed of access to emergency care. If patient were to develop any significant symptomatology, suicidal ideation, homicidal ideation, any concerns, or feel unsafe at any time they are to call the clinic and if unable to get immediate assistance should immediately call 911 or go to the nearest emergency room.  The patient is advised to remove or secure (lock away) all lethal weapons (including guns) and sharps (including razors, scissors, knives, etc.).  All medications (including any prescribed and any over the counter medications) should be stored in a safe and secured location that is not obtainable by children/adolescents.  Patient was given an  opportunity and encouraged to ask questions about their medication, illness, and treatment. Patient contracted verbally for the following: If you are experiencing an emotional crisis or have thoughts of harming yourself or others, please go to your nearest local emergency room or call 911. Will continue to re-assess medication response and side effects frequently to establish efficacy and ensure safety. Risks, any black box warnings, side effects, off label usage, and benefits of medication and treatment discussed with patient, along with potential adverse side effects of current and/or newly prescribed medication, alternative treatment options, and OTC medications.  Patient verbalized understanding of potential risks, any off label use of medication, any black box warnings, and any side effects in their own words. The patient verbalized understanding and agreed to comply with the safety plan discussed in their own words.  Patient given the number to the office. Number also available to the 24- hour suicide hotline.      MEDS ORDERED DURING VISIT:  New Medications Ordered This Visit   Medications   • buPROPion XL (Wellbutrin XL) 300 MG 24 hr tablet     Sig: Take 1 tablet by mouth Every Morning.     Dispense:  30 tablet     Refill:  1   • Cariprazine HCl (Vraylar) 3 MG capsule capsule     Sig: Take 1 capsule by mouth Daily.     Dispense:  30 capsule     Refill:  1   • busPIRone (BUSPAR) 15 MG tablet     Sig: Take 1 tablet by mouth 2 (Two) Times a Day.     Dispense:  60 tablet     Refill:  1       No follow-ups on file.     Treatment plan completed: 2/23/22    Progress toward goal: Not at goal    Functional Status: Mild impairment     Prognosis: Good with Ongoing Treatment         This document has been electronically signed by JENNIFER Reed  August 16, 2022 13:58 EDT     Some of the data in this electronic note has been brought forward from a previous encounter, any necessary changes have been made, it has been  reviewed by this APRN, and it is accurate.    Please note that portions of this note were completed with a voice recognition program. Efforts were made to edit dictation, but occasionally words are mistranscribed.

## 2022-09-20 ENCOUNTER — TELEMEDICINE (OUTPATIENT)
Dept: PSYCHIATRY | Facility: CLINIC | Age: 32
End: 2022-09-20

## 2022-09-20 DIAGNOSIS — F41.1 GENERALIZED ANXIETY DISORDER: Chronic | ICD-10-CM

## 2022-09-20 DIAGNOSIS — F31.30 BIPOLAR I DISORDER, MOST RECENT EPISODE DEPRESSED: Primary | Chronic | ICD-10-CM

## 2022-09-20 PROCEDURE — 99214 OFFICE O/P EST MOD 30 MIN: CPT | Performed by: NURSE PRACTITIONER

## 2022-09-20 NOTE — PROGRESS NOTES
This provider is located at the [facility] (through [facility]), [address], [place], [address] using a secure [employer] Video Visit through [employer]. Patient is being seen remotely via telehealth at their home address in [place], and stated they are in a secure environment for this session. The patient's condition being diagnosed/treated is appropriate for telemedicine. The provider identified herself as well as her credentials.   The patient, and/or patients guardian, consent to be seen remotely, and when consent is given they understand that the consent allows for patient identifiable information to be sent to a third party as needed.   They may refuse to be seen remotely at any time. The electronic data is encrypted and password protected, and the patient and/or guardian has been advised of the potential risks to privacy not withstanding such measures.    You have chosen to receive care through a telehealth visit.  Do you consent to use a video/audio connection for your medical care today? Yes        Subjective   Franca Ruiz is a 32 y.o. female who presents today for follow up    Chief Complaint:  Bipolar disorder, anxiety    Accompanied by: Pt was alone for duration of appointment    History of Present Illness:   Pt reports she is doing much better since last appointment. She is working 1st shift and no longer sleeping 16 hours. She is now averaging 7-9 hours each night with her CPAP. Pt's energy has improved as has mood. Pt has been having minimal depression and anxiety. Appetite has been stable. She reports changing shifts improved everything. Pt is working 50-60 hours each week. Pt continues to maintain sobriety. Pt reports that she is making too much to keep her Medicaid and will be losing it at the end of the year. Pt's work does not offer insurance and she will have to shop around for it. Pt is concerned how she will afford the Vraylar and Suboxone. There  is a  coupon available online. The patient denies any new medical problems or changes in medications since last appointment with this facility. The patient reports compliance with current medication regimen. The patient denies any current side effects from their current medication regimen. The patient denies any abnormal muscle movements or tics. The patient rates their depression at a 3/10 on a 0-10 scale, with 10 being the worst. The patient rates their anxiety at a 3/10 on a 0-10 scale, with 10 being the worst. The patient would like to not adjust or change their medications at this visit. The patient denies any suicidal or homicidal ideations, plans, or intent at today's encounter and is convincing.  The patient denies any auditory hallucinations or visual hallucinations. The patient does not endorse any significant symptoms consistent with leidy or psychosis at today's encounter.     *If the patient has any concerns or needs assistance, they may call the [facility] at [phone]          Prior Psychiatric Medications:  Wellbutrin XL: is helpful  Naltrexone: is helpful   Hydroxyzine: helps, but is sedating  Trazodone: takes PRN and is helpful  Buspar: unsure if effective  Depakote ER: may be beneficial  Paxil:   Zoloft  Celexa  Prozac: may have been helpful  Cymbalta: may have been helpful  Seroquel: side effects; too sedating  Abilify: sedating  Vraylar: helpful        The following portions of the patient's history were reviewed and updated as appropriate: allergies, current medications, past family history, past medical history, past social history, past surgical history and problem list.          Past Medical History:  Past Medical History:   Diagnosis Date   • Allergic    • Anxiety    • Arthritis    • Bilateral ovarian cysts    • Bipolar disorder (HCC)    • Chronic pain    • Depression    • Obesity    • Sleep apnea    • Substance abuse (HCC)        Social  History:  Social History     Socioeconomic History   • Marital status: Single   Tobacco Use   • Smoking status: Former Smoker     Packs/day: 1.00     Years: 10.00     Pack years: 10.00     Types: Cigarettes, Electronic Cigarette     Start date: 2007     Quit date: 2020     Years since quittin.7   • Smokeless tobacco: Never Used   Vaping Use   • Vaping Use: Every day   • Substances: Nicotine, Flavoring   Substance and Sexual Activity   • Alcohol use: Never   • Drug use: Not Currently     Types: Amphetamines, Benzodiazepines, Fentanyl, Hydrocodone, Marijuana, MDMA (ecstacy), Methamphetamines, Morphine, Oxycodone, PCP     Comment: 2021 (Last use)   • Sexual activity: Not Currently     Partners: Male     Birth control/protection: Abstinence, Condom       Family History:  Family History   Problem Relation Age of Onset   • Mental illness Mother    • Arthritis Father    • Hypertension Father    • Mental illness Father    • Obesity Father    • Bipolar disorder Father    • Alcohol abuse Father    • Kidney disease Paternal Aunt    • Mental illness Paternal Aunt    • Bipolar disorder Paternal Aunt    • Mental illness Paternal Uncle    • Sleep apnea Other    • Asthma Other    • COPD Other    • Restless legs syndrome Other        Past Surgical History:  Past Surgical History:   Procedure Laterality Date   • ANKLE SURGERY Right    • WISDOM TOOTH EXTRACTION         Problem List:  Patient Active Problem List   Diagnosis   • Moderate episode of recurrent major depressive disorder ([facility])   • Mild obstructive sleep apnea   • Bilateral carpal tunnel syndrome   • Morbid obesity with BMI of 50.0-59.9, adult ([facility])   • History of substance abuse ([facility])       Allergy:   Allergies   Allergen Reactions   • Augmentin [Amoxicillin-Pot Clavulanate] Other (See Comments)     Throat blisters   • Calvary Flavor [Flavoring Agent] Unknown - Low Severity   • Vantin [Cefpodoxime] Other (See Comments)     Throat blisters         Current Medications:   Current Outpatient Medications   Medication Sig Dispense Refill   • buprenorphine-naloxone (SUBOXONE) 8-2 MG per SL tablet      • buPROPion XL (Wellbutrin XL) 300 MG 24 hr tablet Take 1 tablet by mouth Every Morning. 30 tablet 1   • busPIRone (BUSPAR) 15 MG tablet Take 1 tablet by mouth 2 (Two) Times a Day. 60 tablet 1   • Cariprazine HCl (Vraylar) 3 MG capsule capsule Take 1 capsule by mouth Daily. 30 capsule 1     No current facility-administered medications for this visit.       Review of Symptoms:    Review of Systems   Constitutional: Negative.    Psychiatric/Behavioral: Positive for stress.         Physical Exam:   Due to the remote nature of this encounter (virtual encounter), vitals were unable to be obtained.  Height stated at 61 inches.  Weight stated at 280 pounds.        Physical Exam  Neurological:      Mental Status: She is alert.   Psychiatric:         Attention and Perception: Attention and perception normal.         Mood and Affect: Mood and affect normal.         Speech: Speech normal.         Behavior: Behavior normal. Behavior is cooperative.         Thought Content: Thought content normal. Thought content is not paranoid or delusional. Thought content does not include homicidal or suicidal ideation. Thought content does not include homicidal or suicidal plan.         Cognition and Memory: Cognition and memory normal.         Judgment: Judgment normal.           Mental Status Exam:   Hygiene:   good  Cooperation:  Cooperative  Eye Contact:  Good  Psychomotor Behavior:  Appropriate  Affect:  Appropriate  Mood: normal  Speech:  Normal  Thought Process:  Goal directed  Thought Content:  Normal  Suicidal:  None  Homicidal:  None  Hallucinations:  None  Delusion:  None  Memory:  Intact  Orientation:  Person, Place, Time and Situation  Reliability:  good  Insight:  Good  Judgement:  Good  Impulse Control:  Good  Physical/Medical Issues:  No          Previous Provider notes  and available records reviewed by this APRN at today's encounter.         Lab Results:   No visits with results within 1 Month(s) from this visit.   Latest known visit with results is:   Admission on 12/01/2021, Discharged on 12/01/2021   Component Date Value Ref Range Status   • SARS-CoV-2 SCOTT 12/01/2021 Not Detected  Not Detected Final         Assessment & Plan   Problems Addressed this Visit    None     Visit Diagnoses     Bipolar I disorder, most recent episode depressed (HCC)  (Chronic)   -  Primary    Generalized anxiety disorder  (Chronic)         Diagnoses       Codes Comments    Bipolar I disorder, most recent episode depressed (HCC)    -  Primary ICD-10-CM: F31.30  ICD-9-CM: 296.50     Generalized anxiety disorder     ICD-10-CM: F41.1  ICD-9-CM: 300.02           Visit Diagnoses:    ICD-10-CM ICD-9-CM   1. Bipolar I disorder, most recent episode depressed (HCC)  F31.30 296.50   2. Generalized anxiety disorder  F41.1 300.02          GOALS:  Short Term Goals: Patient will be compliant with medication, and patient will have no significant medication related side effects.  Patient will be engaged in psychotherapy as indicated.  Patient will report subjective improvement of symptoms.  Long term goals: To stabilize mood and treat/improve subjective symptoms, the patient will stay out of the hospital, the patient will be at an optimal level of functioning, and the patient will take all medications as prescribed.  The patient verbalized understanding and agreement with goals that were mutually set.      TREATMENT PLAN:   -Continue Buspar 15 mg PO BID for anxiety.   -Continue Vraylar 3 mg PO Daily for bipolar disorder  -Pt is prescribed Suboxone from another provider  -Continue Wellbutrin  mg PO QAM for bipolar depression    *Discussed the importance of staying on Vraylar while taking Wellbutrin XL. Pt to call if she experiences any symptoms of leidy/hypomania. Pt has previously done well on this combination  of medication. Pt verbalized understanding*    Medication and treatment options, both pharmacological and non-pharmacological treatment options, discussed during today's visit, including any off label use of medication. Patient acknowledged and verbally consented with current treatment plan and was educated on the importance of compliance with treatment and follow-up appointments.        MEDICATION ISSUES:    Discussed treatment plan and medication options of prescribed medication as well as the risks, benefits, any black box warnings, and side effects including potential falls, possible impaired driving, and metabolic adversities among others, including any off label use of medication. Patient is agreeable to call the office with any worsening of symptoms or onset of side effects, or if any concerns or questions arise.  The contact information for the office is made available to the patient. Patient is agreeable to call 911 or go to the nearest ER should they begin having any SI/HI, or if any urgent concerns arise. No medication side effects or related complaints today.       SUICIDE RISK ASSESSMENT: Unalterable demographics and a history of mental health intervention indicate this patient is in a high risk category compared to the general population. At present, the patient denies active SI/HI, intentions, or plans at this time and agrees to seek immediate care should such thoughts develop. The patient verbalizes understanding of how to access emergency care if needed and agrees to do so. Consideration of suicide risk and protective factors such as history, current presentation, individual strengths and weaknesses, psychosocial and environmental stressors and variables, psychiatric illness and symptoms, medical conditions and pain, took place in this interview. Based on those considerations, the patient is determined: within individual baseline and presenting no imminent risk for suicide or homicide. Other  recommendations: The patient does not meet the criteria for inpatient admission and is not a safety risk to self or others at today's visit. Inpatient treatment offers no significant advantages over outpatient treatment for this patient at today's visit.      SAFETY PLAN:  Patient was given ample time for questions and fully participated in treatment planning.  Patient was encouraged to call the clinic with any questions or concerns.  Patient was informed of access to emergency care. If patient were to develop any significant symptomatology, suicidal ideation, homicidal ideation, any concerns, or feel unsafe at any time they are to call the clinic and if unable to get immediate assistance should immediately call 911 or go to the nearest emergency room.  The patient is advised to remove or secure (lock away) all lethal weapons (including guns) and sharps (including razors, scissors, knives, etc.).  All medications (including any prescribed and any over the counter medications) should be stored in a safe and secured location that is not obtainable by children/adolescents.  Patient was given an opportunity and encouraged to ask questions about their medication, illness, and treatment. Patient contracted verbally for the following: If you are experiencing an emotional crisis or have thoughts of harming yourself or others, please go to your nearest local emergency room or call 911. Will continue to re-assess medication response and side effects frequently to establish efficacy and ensure safety. Risks, any black box warnings, side effects, off label usage, and benefits of medication and treatment discussed with patient, along with potential adverse side effects of current and/or newly prescribed medication, alternative treatment options, and OTC medications.  Patient verbalized understanding of potential risks, any off label use of medication, any black box warnings, and any side effects in their own words. The patient  verbalized understanding and agreed to comply with the safety plan discussed in their own words.  Patient given the number to the office. Number also available to the 24- hour suicide hotline.      MEDS ORDERED DURING VISIT:  No orders of the defined types were placed in this encounter.      Return in about 8 weeks (around 11/15/2022), or if symptoms worsen or fail to improve, for Recheck.     Treatment plan completed: 2/23/22    Progress toward goal: Not at goal    Functional Status: Mild impairment     Prognosis: Good with Ongoing Treatment         This document has been electronically signed by JENNIFER Reed  September 20, 2022 17:27 EDT     Some of the data in this electronic note has been brought forward from a previous encounter, any necessary changes have been made, it has been reviewed by this APRN, and it is accurate.    Please note that portions of this note were completed with a voice recognition program. Efforts were made to edit dictation, but occasionally words are mistranscribed.

## 2022-10-31 DIAGNOSIS — F41.1 GENERALIZED ANXIETY DISORDER: Chronic | ICD-10-CM

## 2022-10-31 DIAGNOSIS — F31.30 BIPOLAR I DISORDER, MOST RECENT EPISODE DEPRESSED: Chronic | ICD-10-CM

## 2022-10-31 RX ORDER — BUPROPION HYDROCHLORIDE 300 MG/1
300 TABLET ORAL EVERY MORNING
Qty: 30 TABLET | Refills: 1 | Status: SHIPPED | OUTPATIENT
Start: 2022-10-31 | End: 2022-12-12 | Stop reason: SDUPTHER

## 2022-11-14 ENCOUNTER — TELEMEDICINE (OUTPATIENT)
Dept: PSYCHIATRY | Facility: CLINIC | Age: 32
End: 2022-11-14

## 2022-11-14 DIAGNOSIS — F15.21: ICD-10-CM

## 2022-11-14 DIAGNOSIS — F31.30 BIPOLAR I DISORDER, MOST RECENT EPISODE DEPRESSED: Primary | Chronic | ICD-10-CM

## 2022-11-14 DIAGNOSIS — F41.1 GENERALIZED ANXIETY DISORDER: Chronic | ICD-10-CM

## 2022-11-14 PROCEDURE — 99214 OFFICE O/P EST MOD 30 MIN: CPT | Performed by: NURSE PRACTITIONER

## 2022-11-14 RX ORDER — BUSPIRONE HYDROCHLORIDE 15 MG/1
15 TABLET ORAL 2 TIMES DAILY
Qty: 60 TABLET | Refills: 1 | Status: SHIPPED | OUTPATIENT
Start: 2022-11-14 | End: 2022-12-12 | Stop reason: SDUPTHER

## 2022-11-14 NOTE — PROGRESS NOTES
This provider is located at the [facility] (through [facility]), [address], [place], [address] using a secure [employer] Video Visit through [employer]. Patient is being seen remotely via telehealth at their home address in [place], and stated they are in a secure environment for this session. The patient's condition being diagnosed/treated is appropriate for telemedicine. The provider identified herself as well as her credentials.   The patient, and/or patients guardian, consent to be seen remotely, and when consent is given they understand that the consent allows for patient identifiable information to be sent to a third party as needed.   They may refuse to be seen remotely at any time. The electronic data is encrypted and password protected, and the patient and/or guardian has been advised of the potential risks to privacy not withstanding such measures.    You have chosen to receive care through a telehealth visit.  Do you consent to use a video/audio connection for your medical care today? Yes        Subjective   Franca Ruiz is a 32 y.o. female who presents today for follow up    Chief Complaint:  Bipolar disorder, anxiety    Accompanied by: Pt was alone for duration of appointment    History of Present Illness:   Pt reports she has been doing well over the past 8 weeks. Mood has been fairly stable. She occasionally has a few days where she will feel depressed and/or irritable but it's not constant. Pt loves day shift and feels it has made a significant improvement in her life. Pt is able to enjoy herself with friends when she goes out with them on occasion. Pt continues to maintain sobriety. Pt is going out of town this weekend to celebrate Thanksgiving early with her family. Pt is sleeping on average 7-8 hours of sleep. Appetite is stable. The patient denies any new medical problems or changes in medications since last appointment with this facility. The  patient reports compliance with current medication regimen. The patient denies any current side effects from their current medication regimen. The patient denies any abnormal muscle movements or tics. The patient rates their depression on average in the past week at a 3/10 on a 0-10 scale, with 10 being the worst. The patient rates their anxiety on average the past week at a 3/10 on a 0-10 scale, with 10 being the worst. The patient would like to not adjust or change their medications at this visit. The patient denies any suicidal or homicidal ideations, plans, or intent at today's encounter and is convincing. The patient denies any auditory hallucinations or visual hallucinations. The patient does not endorse any significant symptoms consistent with leidy or psychosis at today's encounter.     *If the patient has any concerns or needs assistance, they may call the [facility] at [phone]*        Prior Psychiatric Medications:  Wellbutrin XL: is helpful  Naltrexone: is helpful   Hydroxyzine: helps, but is sedating  Trazodone: takes PRN and is helpful  Buspar: unsure if effective  Depakote ER: may be beneficial  Paxil:   Zoloft  Celexa  Prozac: may have been helpful  Cymbalta: may have been helpful  Seroquel: side effects; too sedating  Abilify: sedating  Vraylar: helpful        The following portions of the patient's history were reviewed and updated as appropriate: allergies, current medications, past family history, past medical history, past social history, past surgical history and problem list.          Past Medical History:  Past Medical History:   Diagnosis Date   • Allergic    • Anxiety    • Arthritis    • Bilateral ovarian cysts    • Bipolar disorder (HCC)    • Chronic pain    • Depression    • Obesity    • Sleep apnea    • Substance abuse (HCC)        Social History:  Social History     Socioeconomic History   • Marital status: Single   Tobacco Use   • Smoking status: Former      Packs/day: 1.00     Years: 10.00     Pack years: 10.00     Types: Cigarettes, Electronic Cigarette     Start date: 2007     Quit date: 2020     Years since quittin.8   • Smokeless tobacco: Never   Vaping Use   • Vaping Use: Every day   • Substances: Nicotine, Flavoring   Substance and Sexual Activity   • Alcohol use: Never   • Drug use: Not Currently     Types: Amphetamines, Benzodiazepines, Fentanyl, Hydrocodone, Marijuana, MDMA (ecstacy), Methamphetamines, Morphine, Oxycodone, PCP     Comment: 2021 (Last use)   • Sexual activity: Not Currently     Partners: Male     Birth control/protection: Abstinence, Condom       Family History:  Family History   Problem Relation Age of Onset   • Mental illness Mother    • Arthritis Father    • Hypertension Father    • Mental illness Father    • Obesity Father    • Bipolar disorder Father    • Alcohol abuse Father    • Kidney disease Paternal Aunt    • Mental illness Paternal Aunt    • Bipolar disorder Paternal Aunt    • Mental illness Paternal Uncle    • Sleep apnea Other    • Asthma Other    • COPD Other    • Restless legs syndrome Other        Past Surgical History:  Past Surgical History:   Procedure Laterality Date   • ANKLE SURGERY Right    • WISDOM TOOTH EXTRACTION         Problem List:  Patient Active Problem List   Diagnosis   • Moderate episode of recurrent major depressive disorder ([facility])   • Mild obstructive sleep apnea   • Bilateral carpal tunnel syndrome   • Morbid obesity with BMI of 50.0-59.9, adult ([facility])   • History of substance abuse ([facility])       Allergy:   Allergies   Allergen Reactions   • Augmentin [Amoxicillin-Pot Clavulanate] Other (See Comments)     Throat blisters   • [place] Flavor [Flavoring Agent] Unknown - Low Severity   • Vantin [Cefpodoxime] Other (See Comments)     Throat blisters        Current Medications:   Current Outpatient Medications   Medication Sig Dispense Refill   • busPIRone (BUSPAR) 15 MG tablet Take 1 tablet  by mouth 2 (Two) Times a Day. 60 tablet 1   • Cariprazine HCl (Vraylar) 3 MG capsule capsule Take 1 capsule by mouth Daily. 30 capsule 1   • buprenorphine-naloxone (SUBOXONE) 8-2 MG per SL tablet      • buPROPion XL (Wellbutrin XL) 300 MG 24 hr tablet Take 1 tablet by mouth Every Morning. 30 tablet 1     No current facility-administered medications for this visit.       Review of Symptoms:    Review of Systems   Constitutional: Negative.    Psychiatric/Behavioral: Negative.          Physical Exam:   Due to the remote nature of this encounter (virtual encounter), vitals were unable to be obtained.  Height stated at 61 inches.  Weight stated at 280 pounds.        Physical Exam  Neurological:      Mental Status: She is alert.   Psychiatric:         Attention and Perception: Attention and perception normal.         Mood and Affect: Mood normal.         Speech: Speech normal.         Behavior: Behavior normal. Behavior is cooperative.         Thought Content: Thought content normal. Thought content is not paranoid or delusional. Thought content does not include homicidal or suicidal ideation. Thought content does not include homicidal or suicidal plan.         Cognition and Memory: Cognition and memory normal.         Judgment: Judgment normal.      Comments: Restricted affect           Mental Status Exam:   Hygiene:   good  Cooperation:  Cooperative  Eye Contact:  Good  Psychomotor Behavior:  Appropriate  Affect:  Restricted  Mood: normal  Speech:  Normal  Thought Process:  Linear  Thought Content:  Normal  Suicidal:  None  Homicidal:  None  Hallucinations:  None  Delusion:  None  Memory:  Intact  Orientation:  Person, Place, Time and Situation  Reliability:  good  Insight:  Good  Judgement:  Good  Impulse Control:  Good  Physical/Medical Issues:  No          Previous Provider notes and available records reviewed by this APRN at today's encounter.         Lab Results:   No visits with results within 1 Month(s) from this  visit.   Latest known visit with results is:   Admission on 12/01/2021, Discharged on 12/01/2021   Component Date Value Ref Range Status   • SARS-CoV-2 SCOTT 12/01/2021 Not Detected  Not Detected Final         Assessment & Plan   Problems Addressed this Visit    None  Visit Diagnoses     Bipolar I disorder, most recent episode depressed (HCC)  (Chronic)   -  Primary    Relevant Medications    Cariprazine HCl (Vraylar) 3 MG capsule capsule    busPIRone (BUSPAR) 15 MG tablet    Generalized anxiety disorder  (Chronic)       Relevant Medications    Cariprazine HCl (Vraylar) 3 MG capsule capsule    busPIRone (BUSPAR) 15 MG tablet    Amphetamine-type substance use disorder, moderate, in sustained remission (HCC)          Diagnoses       Codes Comments    Bipolar I disorder, most recent episode depressed (HCC)    -  Primary ICD-10-CM: F31.30  ICD-9-CM: 296.50     Generalized anxiety disorder     ICD-10-CM: F41.1  ICD-9-CM: 300.02     Amphetamine-type substance use disorder, moderate, in sustained remission (HCC)     ICD-10-CM: F15.21  ICD-9-CM: 304.43           Visit Diagnoses:    ICD-10-CM ICD-9-CM   1. Bipolar I disorder, most recent episode depressed (HCC)  F31.30 296.50   2. Generalized anxiety disorder  F41.1 300.02   3. Amphetamine-type substance use disorder, moderate, in sustained remission (HCC)  F15.21 304.43          GOALS:  Short Term Goals: Patient will be compliant with medication, and patient will have no significant medication related side effects.  Patient will be engaged in psychotherapy as indicated.  Patient will report subjective improvement of symptoms.  Long term goals: To stabilize mood and treat/improve subjective symptoms, the patient will stay out of the hospital, the patient will be at an optimal level of functioning, and the patient will take all medications as prescribed.  The patient verbalized understanding and agreement with goals that were mutually set.      TREATMENT PLAN:   -Continue  Buspar 15 mg PO BID for anxiety.   -Continue Vraylar 3 mg PO Daily for bipolar disorder  -Pt is prescribed Suboxone from another provider  -Continue Wellbutrin  mg PO QAM for bipolar depression    *Discussed the importance of staying on Vraylar while taking Wellbutrin XL. Pt to call if she experiences any symptoms of leidy/hypomania. Pt has previously done well on this combination of medication. Pt verbalized understanding*    Medication and treatment options, both pharmacological and non-pharmacological treatment options, discussed during today's visit, including any off label use of medication. Patient acknowledged and verbally consented with current treatment plan and was educated on the importance of compliance with treatment and follow-up appointments.        MEDICATION ISSUES:    Discussed treatment plan and medication options of prescribed medication as well as the risks, benefits, any black box warnings, and side effects including potential falls, possible impaired driving, and metabolic adversities among others, including any off label use of medication. Patient is agreeable to call the office with any worsening of symptoms or onset of side effects, or if any concerns or questions arise.  The contact information for the office is made available to the patient. Patient is agreeable to call 911 or go to the nearest ER should they begin having any SI/HI, or if any urgent concerns arise. No medication side effects or related complaints today.       SUICIDE RISK ASSESSMENT: Unalterable demographics and a history of mental health intervention indicate this patient is in a high risk category compared to the general population. At present, the patient denies active SI/HI, intentions, or plans at this time and agrees to seek immediate care should such thoughts develop. The patient verbalizes understanding of how to access emergency care if needed and agrees to do so. Consideration of suicide risk and  protective factors such as history, current presentation, individual strengths and weaknesses, psychosocial and environmental stressors and variables, psychiatric illness and symptoms, medical conditions and pain, took place in this interview. Based on those considerations, the patient is determined: within individual baseline and presenting no imminent risk for suicide or homicide. Other recommendations: The patient does not meet the criteria for inpatient admission and is not a safety risk to self or others at today's visit. Inpatient treatment offers no significant advantages over outpatient treatment for this patient at today's visit.      SAFETY PLAN:  Patient was given ample time for questions and fully participated in treatment planning.  Patient was encouraged to call the clinic with any questions or concerns.  Patient was informed of access to emergency care. If patient were to develop any significant symptomatology, suicidal ideation, homicidal ideation, any concerns, or feel unsafe at any time they are to call the clinic and if unable to get immediate assistance should immediately call 911 or go to the nearest emergency room.  The patient is advised to remove or secure (lock away) all lethal weapons (including guns) and sharps (including razors, scissors, knives, etc.).  All medications (including any prescribed and any over the counter medications) should be stored in a safe and secured location that is not obtainable by children/adolescents.  Patient was given an opportunity and encouraged to ask questions about their medication, illness, and treatment. Patient contracted verbally for the following: If you are experiencing an emotional crisis or have thoughts of harming yourself or others, please go to your nearest local emergency room or call 911. Will continue to re-assess medication response and side effects frequently to establish efficacy and ensure safety. Risks, any black box warnings, side  effects, off label usage, and benefits of medication and treatment discussed with patient, along with potential adverse side effects of current and/or newly prescribed medication, alternative treatment options, and OTC medications.  Patient verbalized understanding of potential risks, any off label use of medication, any black box warnings, and any side effects in their own words. The patient verbalized understanding and agreed to comply with the safety plan discussed in their own words.  Patient given the number to the office. Number also available to the 24- hour suicide hotline.      MEDS ORDERED DURING VISIT:  New Medications Ordered This Visit   Medications   • Cariprazine HCl (Vraylar) 3 MG capsule capsule     Sig: Take 1 capsule by mouth Daily.     Dispense:  30 capsule     Refill:  1   • busPIRone (BUSPAR) 15 MG tablet     Sig: Take 1 tablet by mouth 2 (Two) Times a Day.     Dispense:  60 tablet     Refill:  1       Return in about 8 weeks (around 1/9/2023), or if symptoms worsen or fail to improve, for Recheck.     Treatment plan completed: 2/23/22    Progress toward goal: Not at goal    Functional Status: Mild impairment     Prognosis: Good with Ongoing Treatment         This document has been electronically signed by JENNIFER Reed  November 14, 2022 16:27 EST     Some of the data in this electronic note has been brought forward from a previous encounter, any necessary changes have been made, it has been reviewed by this APRN, and it is accurate.    Please note that portions of this note were completed with a voice recognition program. Efforts were made to edit dictation, but occasionally words are mistranscribed.

## 2022-11-29 ENCOUNTER — LAB (OUTPATIENT)
Dept: LAB | Facility: HOSPITAL | Age: 32
End: 2022-11-29

## 2022-11-29 ENCOUNTER — OFFICE VISIT (OUTPATIENT)
Dept: FAMILY MEDICINE CLINIC | Facility: CLINIC | Age: 32
End: 2022-11-29

## 2022-11-29 VITALS
HEIGHT: 61 IN | HEART RATE: 86 BPM | WEIGHT: 293 LBS | SYSTOLIC BLOOD PRESSURE: 116 MMHG | DIASTOLIC BLOOD PRESSURE: 70 MMHG | BODY MASS INDEX: 55.32 KG/M2 | OXYGEN SATURATION: 97 %

## 2022-11-29 DIAGNOSIS — Z20.2 EXPOSURE TO STD: ICD-10-CM

## 2022-11-29 DIAGNOSIS — H65.92 FLUID LEVEL BEHIND TYMPANIC MEMBRANE OF LEFT EAR: ICD-10-CM

## 2022-11-29 DIAGNOSIS — N76.0 ACUTE VAGINITIS: ICD-10-CM

## 2022-11-29 DIAGNOSIS — N76.0 ACUTE VAGINITIS: Primary | ICD-10-CM

## 2022-11-29 LAB
B-HCG UR QL: NEGATIVE
EXPIRATION DATE: NORMAL
HIV1+2 AB SER QL: NORMAL
INTERNAL NEGATIVE CONTROL: NORMAL
INTERNAL POSITIVE CONTROL: NORMAL
Lab: NORMAL
RPR SER QL: NORMAL

## 2022-11-29 PROCEDURE — 86696 HERPES SIMPLEX TYPE 2 TEST: CPT

## 2022-11-29 PROCEDURE — 81025 URINE PREGNANCY TEST: CPT | Performed by: STUDENT IN AN ORGANIZED HEALTH CARE EDUCATION/TRAINING PROGRAM

## 2022-11-29 PROCEDURE — 87798 DETECT AGENT NOS DNA AMP: CPT | Performed by: STUDENT IN AN ORGANIZED HEALTH CARE EDUCATION/TRAINING PROGRAM

## 2022-11-29 PROCEDURE — 86592 SYPHILIS TEST NON-TREP QUAL: CPT

## 2022-11-29 PROCEDURE — 87661 TRICHOMONAS VAGINALIS AMPLIF: CPT | Performed by: STUDENT IN AN ORGANIZED HEALTH CARE EDUCATION/TRAINING PROGRAM

## 2022-11-29 PROCEDURE — 99214 OFFICE O/P EST MOD 30 MIN: CPT | Performed by: STUDENT IN AN ORGANIZED HEALTH CARE EDUCATION/TRAINING PROGRAM

## 2022-11-29 PROCEDURE — 87801 DETECT AGNT MULT DNA AMPLI: CPT | Performed by: STUDENT IN AN ORGANIZED HEALTH CARE EDUCATION/TRAINING PROGRAM

## 2022-11-29 PROCEDURE — G0432 EIA HIV-1/HIV-2 SCREEN: HCPCS

## 2022-11-29 PROCEDURE — 87491 CHLMYD TRACH DNA AMP PROBE: CPT | Performed by: STUDENT IN AN ORGANIZED HEALTH CARE EDUCATION/TRAINING PROGRAM

## 2022-11-29 PROCEDURE — 87591 N.GONORRHOEAE DNA AMP PROB: CPT | Performed by: STUDENT IN AN ORGANIZED HEALTH CARE EDUCATION/TRAINING PROGRAM

## 2022-11-29 PROCEDURE — 86695 HERPES SIMPLEX TYPE 1 TEST: CPT

## 2022-11-29 RX ORDER — METRONIDAZOLE 500 MG/1
500 TABLET ORAL 2 TIMES DAILY
Qty: 14 TABLET | Refills: 0 | Status: SHIPPED | OUTPATIENT
Start: 2022-11-29 | End: 2023-01-26

## 2022-11-29 NOTE — PROGRESS NOTES
Established Office Visit      Patient Name: Franca Ruiz  : 1990   MRN: [id number]   Care Team: Patient Care Team:  Amisha Potts DO as PCP - General (Internal Medicine)    Chief Complaint:    Chief Complaint   Patient presents with   • Vaginitis   • Earache     Off balance, left side        History of Present Illness: Franca Ruiz is a 32 y.o. female with history of substance abuse in recovery, bipolar, anxiety, JULIAN, obesity who is here today to discuss vaginitis.     She reports having unprotected sex with her boyfriend who she recently found out cheated on her. She has been having white discharge and vaginal itchiness. Symptoms ongoing for 4-6 weeks. Denies fevers, nausea, vomiting, abdominal pain, dysuria.     She reports known exposure to trichomonas.     She also reports a few days of feeling off balance and left ear pressure. No pain, discharge, or fevers. Taking claritin for allergies.     Subjective      Review of Systems:   Review of Systems - See HPI    I have reviewed and the following portions of the patient's history were updated as appropriate: past family history, past medical history, past social history, past surgical history and problem list.    Medications:     Current Outpatient Medications:   •  buprenorphine-naloxone (SUBOXONE) 8-2 MG per SL tablet, , Disp: , Rfl:   •  buPROPion XL (Wellbutrin XL) 300 MG 24 hr tablet, Take 1 tablet by mouth Every Morning., Disp: 30 tablet, Rfl: 1  •  busPIRone (BUSPAR) 15 MG tablet, Take 1 tablet by mouth 2 (Two) Times a Day., Disp: 60 tablet, Rfl: 1  •  metroNIDAZOLE (Flagyl) 500 MG tablet, Take 1 tablet by mouth 2 (Two) Times a Day., Disp: 14 tablet, Rfl: 0    Allergies:   Allergies   Allergen Reactions   • Augmentin [Amoxicillin-Pot Clavulanate] Other (See Comments)     Throat blisters   • [place] Flavor [Flavoring Agent] Unknown - Low Severity   • Vantin [Cefpodoxime] Other (See Comments)     Throat blisters       Objective     Physical  "Exam:  Vital Signs:   Vitals:    11/29/22 0818   BP: 116/70   Pulse: 86   SpO2: 97%   Weight: 135 kg (297 lb)   Height: 154.9 cm (60.98\")     Body mass index is 56.15 kg/m².     Physical Exam  Vitals reviewed.   Constitutional:       Appearance: Normal appearance.   HENT:      Right Ear: Tympanic membrane, ear canal and external ear normal.      Left Ear: Ear canal and external ear normal.      Ears:      Comments: Left middle ear effusion   Cardiovascular:      Rate and Rhythm: Normal rate.   Pulmonary:      Effort: Pulmonary effort is normal. No respiratory distress.   Skin:     General: Skin is warm and dry.   Neurological:      Mental Status: She is alert.   Psychiatric:         Mood and Affect: Mood normal.         Behavior: Behavior normal.         Judgment: Judgment normal.         Assessment / Plan      Assessment/Plan:   Problems Addressed This Visit  Diagnoses and all orders for this visit:    1. Acute vaginitis (Primary)  -     NuSwab VG+ - Swab, Vagina; Future  -     RPR; Future  -     HIV-1/O/2 Ag/Ab w Reflex; Future  -     metroNIDAZOLE (Flagyl) 500 MG tablet; Take 1 tablet by mouth 2 (Two) Times a Day.  Dispense: 14 tablet; Refill: 0  -     POCT pregnancy, urine  -     HSV 1 & 2 - Specific Antibody, IgG; Future    2. Exposure to STD  -     NuSwab VG+ - Swab, Vagina; Future  -     RPR; Future  -     HIV-1/O/2 Ag/Ab w Reflex; Future  -     metroNIDAZOLE (Flagyl) 500 MG tablet; Take 1 tablet by mouth 2 (Two) Times a Day.  Dispense: 14 tablet; Refill: 0  -     POCT pregnancy, urine  -     HSV 1 & 2 - Specific Antibody, IgG; Future    3. Fluid level behind tympanic membrane of left ear      Patient reports recently finding out partner has been unfaithful after unprotected intercourse. now experiencing vaginitis.  Empirically treat for exposure to trichomonas   Full STD screening and pregnancy test   Counseled patient on importance of safe sex habits to prevent unexpected pregnancies and barrier " contraception to prevent STD transmission.  Recommend flonase in addition to current claritin for middle ear effusion/discomfort.       Plan of care reviewed with patient at the conclusion of today's visit. Education was provided regarding diagnosis and management.  Patient verbalizes understanding of and agreement with management plan.    Follow Up:   Return in about 3 months (around 2/28/2023) for Annual.        DO FARIBA Bhagat RD  [facility]  [address]  [facility] [phone]  Fax [phone]  Phone [phone]

## 2022-11-30 LAB
HSV1 IGG SER IA-ACNC: <0.91 INDEX (ref 0–0.9)
HSV2 IGG SER IA-ACNC: <0.91 INDEX (ref 0–0.9)

## 2022-12-02 LAB
A VAGINAE DNA VAG QL NAA+PROBE: NORMAL SCORE
BVAB2 DNA VAG QL NAA+PROBE: NORMAL SCORE
C ALBICANS DNA VAG QL NAA+PROBE: NEGATIVE
C GLABRATA DNA VAG QL NAA+PROBE: NEGATIVE
C TRACH DNA VAG QL NAA+PROBE: NEGATIVE
MEGA1 DNA VAG QL NAA+PROBE: NORMAL SCORE
N GONORRHOEA DNA VAG QL NAA+PROBE: NEGATIVE
T VAGINALIS DNA VAG QL NAA+PROBE: NORMAL

## 2022-12-05 DIAGNOSIS — Z20.2 EXPOSURE TO STD: Primary | ICD-10-CM

## 2022-12-12 ENCOUNTER — LAB (OUTPATIENT)
Dept: LAB | Facility: HOSPITAL | Age: 32
End: 2022-12-12

## 2022-12-12 ENCOUNTER — TELEMEDICINE (OUTPATIENT)
Dept: PSYCHIATRY | Facility: CLINIC | Age: 32
End: 2022-12-12

## 2022-12-12 ENCOUNTER — OFFICE VISIT (OUTPATIENT)
Dept: SLEEP MEDICINE | Facility: HOSPITAL | Age: 32
End: 2022-12-12

## 2022-12-12 VITALS
DIASTOLIC BLOOD PRESSURE: 87 MMHG | SYSTOLIC BLOOD PRESSURE: 138 MMHG | WEIGHT: 293 LBS | HEART RATE: 75 BPM | BODY MASS INDEX: 55.32 KG/M2 | OXYGEN SATURATION: 98 % | HEIGHT: 61 IN

## 2022-12-12 DIAGNOSIS — F41.1 GENERALIZED ANXIETY DISORDER: Chronic | ICD-10-CM

## 2022-12-12 DIAGNOSIS — F31.30 BIPOLAR I DISORDER, MOST RECENT EPISODE DEPRESSED: Primary | Chronic | ICD-10-CM

## 2022-12-12 DIAGNOSIS — Z20.2 EXPOSURE TO STD: ICD-10-CM

## 2022-12-12 DIAGNOSIS — F15.21: Chronic | ICD-10-CM

## 2022-12-12 DIAGNOSIS — G47.33 OSA (OBSTRUCTIVE SLEEP APNEA): Primary | ICD-10-CM

## 2022-12-12 PROCEDURE — 87491 CHLMYD TRACH DNA AMP PROBE: CPT

## 2022-12-12 PROCEDURE — 87661 TRICHOMONAS VAGINALIS AMPLIF: CPT

## 2022-12-12 PROCEDURE — 99213 OFFICE O/P EST LOW 20 MIN: CPT | Performed by: NURSE PRACTITIONER

## 2022-12-12 PROCEDURE — 87591 N.GONORRHOEAE DNA AMP PROB: CPT

## 2022-12-12 PROCEDURE — 99214 OFFICE O/P EST MOD 30 MIN: CPT | Performed by: NURSE PRACTITIONER

## 2022-12-12 RX ORDER — BUPROPION HYDROCHLORIDE 300 MG/1
300 TABLET ORAL EVERY MORNING
Qty: 90 TABLET | Refills: 0 | Status: SHIPPED | OUTPATIENT
Start: 2022-12-12 | End: 2023-03-20 | Stop reason: SDUPTHER

## 2022-12-12 RX ORDER — BUSPIRONE HYDROCHLORIDE 15 MG/1
15 TABLET ORAL 2 TIMES DAILY
Qty: 180 TABLET | Refills: 0 | Status: SHIPPED | OUTPATIENT
Start: 2022-12-12 | End: 2023-03-20 | Stop reason: SDUPTHER

## 2022-12-12 NOTE — PROGRESS NOTES
Chief Complaint:   Chief Complaint   Patient presents with   • Follow-up       HPI:    Franca Ruiz is a 32 y.o. female here for follow-up of sleep apnea.  Patient was last seen 1/24/2022.  Patient continues to do well with CPAP therapy.  Patient sleeps 8 hours nightly and does feel rested upon awakening.  He will usually take her 20 to 30 minutes to go to sleep is very rare that she gets up in the night.  Patient has an [place] score of 3/24.  Patient has no concerns or complaints and wishes to continue therapy.        Current medications are:   Current Outpatient Medications:   •  buPROPion XL (Wellbutrin XL) 300 MG 24 hr tablet, Take 1 tablet by mouth Every Morning., Disp: 90 tablet, Rfl: 0  •  busPIRone (BUSPAR) 15 MG tablet, Take 1 tablet by mouth 2 (Two) Times a Day., Disp: 180 tablet, Rfl: 0  •  Cariprazine HCl (Vraylar) 3 MG capsule capsule, Take 1 capsule by mouth Daily., Disp: 90 capsule, Rfl: 0  •  buprenorphine-naloxone (SUBOXONE) 8-2 MG per SL tablet, , Disp: , Rfl:   •  metroNIDAZOLE (Flagyl) 500 MG tablet, Take 1 tablet by mouth 2 (Two) Times a Day., Disp: 14 tablet, Rfl: 0.      The patient's relevant past medical, surgical, family and social history were reviewed and updated in Epic as appropriate.       Review of Systems   Respiratory: Positive for apnea.    Psychiatric/Behavioral: Positive for behavioral problems, decreased concentration and sleep disturbance.   All other systems reviewed and are negative.        Objective:    Physical Exam  Constitutional:       Appearance: Normal appearance.   HENT:      Head: Normocephalic and atraumatic.      Mouth/Throat:      Comments: Class 4 airway  Cardiovascular:      Rate and Rhythm: Normal rate and regular rhythm.   Pulmonary:      Effort: Pulmonary effort is normal.      Breath sounds: Normal breath sounds.   Skin:     General: Skin is warm and dry.   Neurological:      Mental Status: She is alert and oriented to person, place, and time.  "  Psychiatric:         Mood and Affect: Mood normal.         Behavior: Behavior normal.         Thought Content: Thought content normal.         Judgment: Judgment normal.       /87   Pulse 75   Ht 154.9 cm (61\")   Wt 136 kg (299 lb)   SpO2 98%   BMI 56.50 kg/m²     CPAP Report    89/90 days of use  Greater than 4-hour use 86%  Settings 8-18  AHI 0.8  95th percentile pressure 9.2  The patient continues to use and benefit from CPAP therapy.    ASSESSMENT/PLAN    Diagnoses and all orders for this visit:    1. JULIAN (obstructive sleep apnea) (Primary)  -     PAP Therapy        1. Counseled patient regarding multimodal approach with healthy nutrition, healthy sleep, regular physical activity, social activities, counseling, and medications. Encouraged to practice lateral sleep position. Avoid alcohol and sedatives close to bedtime.  2.   Refill supplies x1 year.  Return to clinic 1 year or sooner symptoms warrant.    I have reviewed the results of my evaluation and impression and discussed my recommendations in detail with the patient.      Signed by  Yoon Velasco, APRN    December 12, 2022      CC: Amisha Potts,          No ref. provider found      "

## 2022-12-12 NOTE — PROGRESS NOTES
"This provider is located at the [facility] (through [facility]), [address], [place], [address] using a secure [employer] Video Visit through [employer]. Patient is being seen remotely via telehealth at their home address in [place], and stated they are in a secure environment for this session. The patient's condition being diagnosed/treated is appropriate for telemedicine. The provider identified herself as well as her credentials.   The patient, and/or patients guardian, consent to be seen remotely, and when consent is given they understand that the consent allows for patient identifiable information to be sent to a third party as needed.   They may refuse to be seen remotely at any time. The electronic data is encrypted and password protected, and the patient and/or guardian has been advised of the potential risks to privacy not withstanding such measures.    You have chosen to receive care through a telehealth visit.  Do you consent to use a video/audio connection for your medical care today? Yes        Subjective   Franca Ruiz is a 32 y.o. female who presents today for follow up    Chief Complaint:  Bipolar disorder, anxiety    Accompanied by: Pt was alone for duration of appointment    History of Present Illness:   Pt reports she is doing \"alright\". However, she loses her insurance at the end of the month. Pt is hoping to get her medications refilled to give her some time to figure out what she is going to do. Pt states she may see if her Suboxone clinic will take over the prescriptions since she has to go there for the Suboxone. Recommended pt apply for the [place] Patient Assistance Program and if she is getting close to running out of medications, call the [facility] health department to see if they have samples she can have. Mood has been stable as is appetite and sleep. Pt maintains sobriety. The patient denies any new medical problems or changes in " medications since last appointment with this facility. The patient reports compliance with current medication regimen. The patient denies any current side effects from their current medication regimen. The patient denies any abnormal muscle movements or tics. The patient would like to not adjust or change their medications at this visit. The patient denies any suicidal or homicidal ideations, plans, or intent at today's encounter and is convincing. The patient denies any auditory hallucinations or visual hallucinations. The patient does not endorse any significant symptoms consistent with leidy or psychosis at today's encounter.     *If the patient has any concerns or needs assistance, they may call the [facility] at [phone]*        Prior Psychiatric Medications:  Wellbutrin XL: is helpful  Naltrexone: is helpful   Hydroxyzine: helps, but is sedating  Trazodone: takes PRN and is helpful  Buspar: unsure if effective  Depakote ER: may be beneficial  Paxil:   Zoloft  Celexa  Prozac: may have been helpful  Cymbalta: may have been helpful  Seroquel: side effects; too sedating  Abilify: sedating  Vraylar: helpful        The following portions of the patient's history were reviewed and updated as appropriate: allergies, current medications, past family history, past medical history, past social history, past surgical history and problem list.          Past Medical History:  Past Medical History:   Diagnosis Date   • Allergic    • Anxiety    • Arthritis    • Bilateral ovarian cysts    • Bipolar disorder (HCC)    • Chronic pain    • Depression    • Obesity    • Sleep apnea    • Substance abuse (HCC)        Social History:  Social History     Socioeconomic History   • Marital status: Single   Tobacco Use   • Smoking status: Former     Packs/day: 1.00     Years: 10.00     Pack years: 10.00     Types: Cigarettes, Electronic Cigarette     Start date: 6/1/2007     Quit date: 1/1/2020     Years  since quittin.9   • Smokeless tobacco: Never   Vaping Use   • Vaping Use: Every day   • Substances: Nicotine, Flavoring   Substance and Sexual Activity   • Alcohol use: Never   • Drug use: Not Currently     Types: Amphetamines, Benzodiazepines, Fentanyl, Hydrocodone, Marijuana, MDMA (ecstacy), Methamphetamines, Morphine, Oxycodone, PCP     Comment: 2021 (Last use)   • Sexual activity: Not Currently     Partners: Male     Birth control/protection: Condom, Abstinence       Family History:  Family History   Problem Relation Age of Onset   • Mental illness Mother    • Arthritis Father    • Hypertension Father    • Mental illness Father    • Obesity Father    • Bipolar disorder Father    • Alcohol abuse Father    • Kidney disease Paternal Aunt    • Mental illness Paternal Aunt    • Bipolar disorder Paternal Aunt    • Mental illness Paternal Uncle    • Sleep apnea Other    • Asthma Other    • COPD Other    • Restless legs syndrome Other        Past Surgical History:  Past Surgical History:   Procedure Laterality Date   • ANKLE SURGERY Right    • WISDOM TOOTH EXTRACTION         Problem List:  Patient Active Problem List   Diagnosis   • Moderate episode of recurrent major depressive disorder ([facility])   • Mild obstructive sleep apnea   • Bilateral carpal tunnel syndrome   • Morbid obesity with BMI of 50.0-59.9, adult ([facility])   • History of substance abuse ([facility])       Allergy:   Allergies   Allergen Reactions   • Augmentin [Amoxicillin-Pot Clavulanate] Other (See Comments)     Throat blisters   • Crow Flavor [Flavoring Agent] Unknown - Low Severity   • Vantin [Cefpodoxime] Other (See Comments)     Throat blisters        Current Medications:   Current Outpatient Medications   Medication Sig Dispense Refill   • buPROPion XL (Wellbutrin XL) 300 MG 24 hr tablet Take 1 tablet by mouth Every Morning. 90 tablet 0   • busPIRone (BUSPAR) 15 MG tablet Take 1 tablet by mouth 2 (Two) Times a Day. 180 tablet 0   •  buprenorphine-naloxone (SUBOXONE) 8-2 MG per SL tablet      • Cariprazine HCl (Vraylar) 3 MG capsule capsule Take 1 capsule by mouth Daily. 90 capsule 0   • metroNIDAZOLE (Flagyl) 500 MG tablet Take 1 tablet by mouth 2 (Two) Times a Day. 14 tablet 0     No current facility-administered medications for this visit.       Review of Symptoms:    Review of Systems   Constitutional: Negative.    Psychiatric/Behavioral: Positive for stress.         Physical Exam:   Due to the remote nature of this encounter (virtual encounter), vitals were unable to be obtained.  Height stated at 61 inches.  Weight stated at 297 pounds.        Physical Exam  Neurological:      Mental Status: She is alert.   Psychiatric:         Attention and Perception: Attention and perception normal. She does not perceive auditory or visual hallucinations.         Mood and Affect: Mood normal.         Speech: Speech normal.         Behavior: Behavior normal. Behavior is cooperative.         Thought Content: Thought content normal. Thought content is not paranoid or delusional. Thought content does not include homicidal or suicidal ideation. Thought content does not include homicidal or suicidal plan.         Cognition and Memory: Cognition and memory normal.         Judgment: Judgment normal.      Comments: Restricted affect           Mental Status Exam:   Hygiene:   good  Cooperation:  Cooperative  Eye Contact:  Good  Psychomotor Behavior:  Appropriate  Affect:  Restricted  Mood: normal  Speech:  Normal  Thought Process:  Goal directed  Thought Content:  Normal  Suicidal:  None  Homicidal:  None  Hallucinations:  None  Delusion:  None  Memory:  Intact  Orientation:  Person, Place, Time and Situation  Reliability:  good  Insight:  Good  Judgement:  Good  Impulse Control:  Good  Physical/Medical Issues:  No          Previous Provider notes and available records reviewed by this APRN at today's encounter.         Lab Results:   Lab on 11/29/2022    Component Date Value Ref Range Status   • RPR 11/29/2022 Non-Reactive  Non-Reactive Final   • HIV-1/ HIV-2 11/29/2022 Non-Reactive  Non-Reactive Final    A non-reactive test result does not preclude the possibility of exposure to HIV or infection with HIV. An antibody response to recent exposure may take several months to reach detectable levels.   • HSV 1 IgG, Type Specific 11/29/2022 <0.91  0.00 - 0.90 index Final                                     Negative        <0.91                                   Equivocal 0.91 - 1.09                                   Positive        >1.09   Note: Negative indicates no antibodies detected to   HSV-1. Equivocal may suggest early infection.  If   clinically appropriate, retest at later date. Positive   indicates antibodies detected to HSV-1.   • HSV 2 IgG 11/29/2022 <0.91  0.00 - 0.90 index Final                                     Negative        <0.91                                   Equivocal 0.91 - 1.09                                   Positive        >1.09   Note: Negative indicates no HSV-2 antibodies detected.   Positive indicates HSV-2 antibodies detected.   Equivocal and low positive HSV-2 screens   (Index 0.91-5.00) may be false positive and are   reflexed to supplemental testing in accordance with   CDC guidelines.   Office Visit on 11/29/2022   Component Date Value Ref Range Status   • HCG, Urine, QL 11/29/2022 Negative  Negative Final   • Lot Number 11/29/2022 [id number]   Final   • Internal Positive Control 11/29/2022 Passed  Positive, Passed Final   • Internal Negative Control 11/29/2022 Passed  Negative, Passed Final   • Expiration Date 11/29/2022 10-31-23   Final   • Atopobium Vaginae 11/29/2022 Low - 0  Score Final   • BVAB 2 11/29/2022 Low - 0  Score Final   • Megasphaera 1 11/29/2022 Low - 0  Score Final    Calculate total score by adding the 3 individual bacterial  vaginosis (BV) marker scores together.  Total score is  interpreted as follows:  Total  score 0-1: Indicates the absence of BV.  Total score   2: Indeterminate for BV. Additional clinical                   data should be evaluated to establish a                   diagnosis.  Total score 3-6: Indicates the presence of BV.  This test was developed and its performance characteristics  determined by [facility].  It has not been cleared or approved  by the Food and Drug Administration.   • Yenifer Albicans, SCOTT 11/29/2022 Negative  Negative Final   • Yenifer Glabrata, SCOTT 11/29/2022 Negative  Negative Final   • Trichomonas vaginosis 11/29/2022 TNP   Final    Test Not Performed.  The specimen submitted was too viscous  for analysis.   • Chlamydia trachomatis, SCOTT 11/29/2022 Negative  Negative Final   • Neisseria gonorrhoeae, SCOTT 11/29/2022 Negative  Negative Final         Assessment & Plan   Problems Addressed this Visit    None  Visit Diagnoses     Bipolar I disorder, most recent episode depressed (HCC)  (Chronic)   -  Primary    Relevant Medications    Cariprazine HCl (Vraylar) 3 MG capsule capsule    busPIRone (BUSPAR) 15 MG tablet    buPROPion XL (Wellbutrin XL) 300 MG 24 hr tablet    Generalized anxiety disorder  (Chronic)       Relevant Medications    Cariprazine HCl (Vraylar) 3 MG capsule capsule    busPIRone (BUSPAR) 15 MG tablet    buPROPion XL (Wellbutrin XL) 300 MG 24 hr tablet    Amphetamine-type substance use disorder, moderate, in sustained remission (HCC)  (Chronic)         Diagnoses       Codes Comments    Bipolar I disorder, most recent episode depressed (HCC)    -  Primary ICD-10-CM: F31.30  ICD-9-CM: 296.50     Generalized anxiety disorder     ICD-10-CM: F41.1  ICD-9-CM: 300.02     Amphetamine-type substance use disorder, moderate, in sustained remission (HCC)     ICD-10-CM: F15.21  ICD-9-CM: 304.43           Visit Diagnoses:    ICD-10-CM ICD-9-CM   1. Bipolar I disorder, most recent episode depressed (HCC)  F31.30 296.50   2. Generalized anxiety disorder  F41.1 300.02   3. Amphetamine-type  substance use disorder, moderate, in sustained remission ([facility])  F15.21 304.43          GOALS:  Short Term Goals: Patient will be compliant with medication, and patient will have no significant medication related side effects.  Patient will be engaged in psychotherapy as indicated.  Patient will report subjective improvement of symptoms.  Long term goals: To stabilize mood and treat/improve subjective symptoms, the patient will stay out of the hospital, the patient will be at an optimal level of functioning, and the patient will take all medications as prescribed.  The patient verbalized understanding and agreement with goals that were mutually set.      TREATMENT PLAN:   -Continue Buspar 15 mg PO BID for anxiety.   -Continue Vraylar 3 mg PO Daily for bipolar disorder  -Pt is prescribed Suboxone from another provider  -Continue Wellbutrin  mg PO QAM for bipolar depression    -Due to losing her insurance at the end of the month, pt is requesting 90 day supply on her medications. Pt states she may see if her Suboxone clinic will take over the prescriptions since she has to go there for the Suboxone. Recommended pt apply for the [place] Patient Assistance Program and if she is getting close to running out of medications, call the [facility] department to see if they have samples she can have. Pt states she will call and keep this APRN updated on her situation. Pt to follow-up PRN as this time.     *Discussed the importance of staying on Vraylar while taking Wellbutrin XL. Pt to call if she experiences any symptoms of leidy/hypomania. Pt has previously done well on this combination of medication. Pt verbalized understanding*    Medication and treatment options, both pharmacological and non-pharmacological treatment options, discussed during today's visit, including any off label use of medication. Patient acknowledged and verbally consented with current treatment plan and was educated on the importance of  compliance with treatment and follow-up appointments.        MEDICATION ISSUES:    Discussed treatment plan and medication options of prescribed medication as well as the risks, benefits, any black box warnings, and side effects including potential falls, possible impaired driving, and metabolic adversities among others, including any off label use of medication. Patient is agreeable to call the office with any worsening of symptoms or onset of side effects, or if any concerns or questions arise.  The contact information for the office is made available to the patient. Patient is agreeable to call 911 or go to the nearest ER should they begin having any SI/HI, or if any urgent concerns arise. No medication side effects or related complaints today.       SUICIDE RISK ASSESSMENT: Unalterable demographics and a history of mental health intervention indicate this patient is in a high risk category compared to the general population. At present, the patient denies active SI/HI, intentions, or plans at this time and agrees to seek immediate care should such thoughts develop. The patient verbalizes understanding of how to access emergency care if needed and agrees to do so. Consideration of suicide risk and protective factors such as history, current presentation, individual strengths and weaknesses, psychosocial and environmental stressors and variables, psychiatric illness and symptoms, medical conditions and pain, took place in this interview. Based on those considerations, the patient is determined: within individual baseline and presenting no imminent risk for suicide or homicide. Other recommendations: The patient does not meet the criteria for inpatient admission and is not a safety risk to self or others at today's visit. Inpatient treatment offers no significant advantages over outpatient treatment for this patient at today's visit.      SAFETY PLAN:  Patient was given ample time for questions and fully  participated in treatment planning.  Patient was encouraged to call the clinic with any questions or concerns.  Patient was informed of access to emergency care. If patient were to develop any significant symptomatology, suicidal ideation, homicidal ideation, any concerns, or feel unsafe at any time they are to call the clinic and if unable to get immediate assistance should immediately call 911 or go to the nearest emergency room.  The patient is advised to remove or secure (lock away) all lethal weapons (including guns) and sharps (including razors, scissors, knives, etc.).  All medications (including any prescribed and any over the counter medications) should be stored in a safe and secured location that is not obtainable by children/adolescents.  Patient was given an opportunity and encouraged to ask questions about their medication, illness, and treatment. Patient contracted verbally for the following: If you are experiencing an emotional crisis or have thoughts of harming yourself or others, please go to your nearest local emergency room or call 911. Will continue to re-assess medication response and side effects frequently to establish efficacy and ensure safety. Risks, any black box warnings, side effects, off label usage, and benefits of medication and treatment discussed with patient, along with potential adverse side effects of current and/or newly prescribed medication, alternative treatment options, and OTC medications.  Patient verbalized understanding of potential risks, any off label use of medication, any black box warnings, and any side effects in their own words. The patient verbalized understanding and agreed to comply with the safety plan discussed in their own words.  Patient given the number to the office. Number also available to the 24- hour suicide hotline.      MEDS ORDERED DURING VISIT:  New Medications Ordered This Visit   Medications   • Cariprazine HCl (Vraylar) 3 MG capsule capsule      Sig: Take 1 capsule by mouth Daily.     Dispense:  90 capsule     Refill:  0   • busPIRone (BUSPAR) 15 MG tablet     Sig: Take 1 tablet by mouth 2 (Two) Times a Day.     Dispense:  180 tablet     Refill:  0   • buPROPion XL (Wellbutrin XL) 300 MG 24 hr tablet     Sig: Take 1 tablet by mouth Every Morning.     Dispense:  90 tablet     Refill:  0       Return if symptoms worsen or fail to improve.     Treatment plan completed: 2/23/22    Progress toward goal: Not at goal    Functional Status: Mild impairment     Prognosis: Good with Ongoing Treatment         This document has been electronically signed by JENNIFER Reed  December 12, 2022 10:51 EST     Some of the data in this electronic note has been brought forward from a previous encounter, any necessary changes have been made, it has been reviewed by this APRN, and it is accurate.    Please note that portions of this note were completed with a voice recognition program. Efforts were made to edit dictation, but occasionally words are mistranscribed.

## 2022-12-13 ENCOUNTER — TELEPHONE (OUTPATIENT)
Dept: FAMILY MEDICINE CLINIC | Facility: CLINIC | Age: 32
End: 2022-12-13

## 2022-12-13 DIAGNOSIS — E66.01 MORBID OBESITY WITH BMI OF 50.0-59.9, ADULT: Primary | ICD-10-CM

## 2022-12-13 NOTE — TELEPHONE ENCOUNTER
Caller: Franca Ruiz    Relationship: Self    Best call back number: [phone]    What is the medical concern/diagnosis: WEIGHT LOSS    What specialty or service is being requested: WEIGHT MANAGEMENT     What is the provider, practice or medical service name:     What is the office location:     What is the office phone number:    Any additional details:

## 2022-12-14 LAB
C TRACH RRNA SPEC QL NAA+PROBE: NEGATIVE
N GONORRHOEA RRNA SPEC QL NAA+PROBE: NEGATIVE
T VAGINALIS RRNA SPEC QL NAA+PROBE: NEGATIVE

## 2022-12-16 ENCOUNTER — PATIENT MESSAGE (OUTPATIENT)
Dept: FAMILY MEDICINE CLINIC | Facility: CLINIC | Age: 32
End: 2022-12-16

## 2023-01-24 ENCOUNTER — OFFICE VISIT (OUTPATIENT)
Dept: BARIATRICS/WEIGHT MGMT | Facility: CLINIC | Age: 33
End: 2023-01-24
Payer: COMMERCIAL

## 2023-01-24 VITALS
WEIGHT: 293 LBS | HEART RATE: 78 BPM | DIASTOLIC BLOOD PRESSURE: 86 MMHG | SYSTOLIC BLOOD PRESSURE: 132 MMHG | HEIGHT: 60 IN | BODY MASS INDEX: 57.52 KG/M2

## 2023-01-24 DIAGNOSIS — R79.89 LOW VITAMIN D LEVEL: ICD-10-CM

## 2023-01-24 DIAGNOSIS — E66.01 MORBID OBESITY WITH BMI OF 50.0-59.9, ADULT: Primary | ICD-10-CM

## 2023-01-24 PROBLEM — G47.30 SLEEP APNEA: Status: ACTIVE | Noted: 2021-08-24

## 2023-01-24 PROBLEM — F31.9 BIPOLAR DISORDER: Status: ACTIVE | Noted: 2023-01-24

## 2023-01-24 PROBLEM — G47.30 SLEEP APNEA: Status: ACTIVE | Noted: 2023-01-24

## 2023-01-24 PROBLEM — F19.10 SUBSTANCE ABUSE: Status: ACTIVE | Noted: 2023-01-24

## 2023-01-24 PROCEDURE — 99215 OFFICE O/P EST HI 40 MIN: CPT | Performed by: NURSE PRACTITIONER

## 2023-01-24 NOTE — PROGRESS NOTES
[facility]  [address]  [place] [address]   Office Note      Date: 2023  Patient Name: Franca Ruiz  MRN: [id number]  : 1990  Subjective  Subjective     Chief Complaint  Obesity Management , Initial consult          Franca Ruiz presents to [facility] for obesity management.    Highest lifetime weight: 330 pounds. Today's weight is (!) 137 kg (302 lb 12.8 oz) pounds.   Weight 5 years ago: 200  The patient is exercising with a FITT score of:    Frequency   Intensity Time Strength Training   [x]   0 None  []   0 None  []   0 None  [x]   0 None    []   1 (1-2x/week) [x]   1 (light) []   1 (<10 min) []   1 (1x/week)   []   2 (3-5x/week) []   2 (moderate) []   2 (10-20 min) []   2 (2x/week)   []   3 (daily)   []   3 (moderately hard)  []   4 (very hard) [x]   3 (20-30 min)  []   4 (>30 min) []   3 (3-4x/week)         The following seem to sabotage weight loss efforts:eating too fast, specific cravings like carbohydrates, convenience food (fast food) and sugary drinks such as sodas or energy drinks.     Review of Systems   Constitutional: Negative for fatigue.        Positive for weight gain   HENT: Negative for trouble swallowing.         Negative for throat swelling   Respiratory: Positive for apnea. Negative for shortness of breath and wheezing.         Negative for snoring   Cardiovascular: Negative for chest pain, palpitations and leg swelling.   Gastrointestinal: Negative for abdominal pain, constipation, diarrhea, GERD and indigestion.   Endocrine: Negative for cold intolerance, heat intolerance, polydipsia, polyphagia and polyuria.        Negative for loss of hair  Negative for hirsutism     Genitourinary:        Denies menstrual irregularities   Musculoskeletal: Negative for arthralgias.        Denies exercise limitations  Denies chronic pain   Skin: Negative for dry skin.        Negative for acne  "  Neurological: Negative for headache and memory problem.        Negative for paresthesias   Psychiatric/Behavioral: Negative for self-injury, sleep disturbance, suicidal ideas and depressed mood. The patient is not nervous/anxious.        PHQ-9 Total Score: 13       Objective   Body mass index is 59.14 kg/m².  Body composition analysis completed and showed:   %body fat: 52.2    Measurements (in inches)  Neck: 17  Chest: 50  Waist: 48  Hips: 61  Thighs: 51    Vital Signs:   /86   Pulse 78   Ht 152.4 cm (60\")   Wt (!) 137 kg (302 lb 12.8 oz)   BMI 59.14 kg/m²       Physical Exam  Constitutional:       Appearance: Normal appearance. She is obese.   Cardiovascular:      Rate and Rhythm: Normal rate and regular rhythm.      Heart sounds: Normal heart sounds.   Pulmonary:      Effort: Pulmonary effort is normal. No respiratory distress.      Breath sounds: Normal breath sounds.   Skin:     General: Skin is warm and dry.   Neurological:      Mental Status: She is alert and oriented to person, place, and time.   Psychiatric:         Attention and Perception: Attention and perception normal.         Mood and Affect: Mood normal.         Speech: Speech normal.         Behavior: Behavior normal.        Result Review :                   Assessment / Plan       Diagnoses and all orders for this visit:    1. Morbid obesity with BMI of 50.0-59.9, adult (HCC) (Primary)  Assessment & Plan:  Patient's (Body mass index is 59.14 kg/m².) indicates that they are obese (BMI >30) with health conditions that include obstructive sleep apnea and osteoarthritis . Weight is newly identified. BMI is is above average; BMI management plan is completed. We discussed low calorie, low carb based diet program, portion control, increasing exercise and an diana-based approach such as [employer] Pal or Lose It.   -- This is an initial visit and patient was referred to us from her primary care provider  --Body comp analysis was reviewed in office " today.  Baritastic diana was set up based on basal metabolic rate.  Patient advised that #1 goal is to start food looking in particular at calories, protein, carbs.  We are looking to keep our protein elevated and her carbs lowered.  Advised that were not looking for perfection on this but just starting to analyze what type of macros her food is having.  -- Currently drinking around 4 sweet drinks including sodas and monster energy drinks daily.  Goal to reduce this to no more than 1 a day with eventually less than that.  -- Goal to be thinking of ways that she can start her day with a protein-based breakfast versus adding a higher sugar breakfast such as miniature donuts.  -- Patient is interested in pharmacology.  Medication eligibility sheet provided and asked her to check with her insurance for coverage options and bring into next office visit.  We will discuss further after receiving labs at next office visit in 2 weeks.    Orders:  -     Cancel: CBC & Differential  -     Cancel: Comprehensive Metabolic Panel  -     Cancel: Hemoglobin A1c  -     Cancel: Insulin, Total  -     Cancel: Lipid Panel  -     Cancel: T3, Free  -     Cancel: TSH  -     Cancel: Urine Drug Screen - Urine, Clean Catch  -     Cancel: Vitamin D,25-Hydroxy  -     CBC & Differential  -     Comprehensive Metabolic Panel  -     Hemoglobin A1c  -     Insulin, Total  -     Lipid Panel  -     T3, Free  -     TSH  -     Urine Drug Screen - Urine, Clean Catch  -     Vitamin D,25-Hydroxy    2. Low vitamin D level  -     Cancel: Vitamin D,25-Hydroxy  -     Vitamin D,25-Hydroxy       Topics of discussion included obesity as a disease, nutritional education on food groups, exercise, and medications. Patient was instructed in adequate protein, controlled carb and controlled fat intake. Patient received instructions on using the medicines as a tool in controlling their weight with nutritional and behavioral changes. Risks and benefits were discussed. I  believe the potential benefits of medication helping to decrease weight outweighs the risks. Patient is to try nutritonal/behavioral changes only first. Patient received our clinic education booklet.   Our patient consent form was reviewed including potential risks of weight loss. We also reviewed our confidentiality and HIPPA statements. Patients current FITT score was reviewed along with current capability for exercise tolerance and a patient will work towards a FITT score of: Patient's past medical history was reviewed in detail and barriers to weight loss were identified and discussed. Past efforts at weight reduction on their own as well as under medical provider supervision were documented and discussed.  I advised patient to continue routine care with their Primary Care Provider. Nutritional recommendations and goals were reviewed based on body composition analysis including basal metabolic rate. Goal set for Calories,  adjusted for exercise calories burnt as well as Macronutrient. Take other medications and supplements as directed. Increase physical activity as tolerated without side effects.       I spent 75 minutes on this date of service. This time includes time spent by me in the following activities:preparing for the visit, counseling and educating the patient/family/caregiver, ordering medications, tests, or procedures and documenting information in the medical record.    Follow Up   No follow-ups on file.  Patient was given instructions and counseling regarding her condition or for health maintenance advice. Please see specific information pulled into the AVS if appropriate.     Gracy Singh, APRN  01/24/2023

## 2023-01-26 ENCOUNTER — TELEMEDICINE (OUTPATIENT)
Dept: PSYCHIATRY | Facility: CLINIC | Age: 33
End: 2023-01-26
Payer: COMMERCIAL

## 2023-01-26 DIAGNOSIS — F31.30 BIPOLAR I DISORDER, MOST RECENT EPISODE DEPRESSED: Primary | Chronic | ICD-10-CM

## 2023-01-26 DIAGNOSIS — F41.1 GENERALIZED ANXIETY DISORDER: Chronic | ICD-10-CM

## 2023-01-26 DIAGNOSIS — F15.21: ICD-10-CM

## 2023-01-26 LAB
25(OH)D3+25(OH)D2 SERPL-MCNC: 25.2 NG/ML (ref 30–100)
ALBUMIN SERPL-MCNC: 4.4 G/DL (ref 3.8–4.8)
ALBUMIN/GLOB SERPL: 1.8 {RATIO} (ref 1.2–2.2)
ALP SERPL-CCNC: 96 IU/L (ref 44–121)
ALT SERPL-CCNC: 28 IU/L (ref 0–32)
AMPHETAMINES UR QL SCN: NEGATIVE NG/ML
AST SERPL-CCNC: 26 IU/L (ref 0–40)
BARBITURATES UR QL SCN: NEGATIVE NG/ML
BASOPHILS # BLD AUTO: 0.1 X10E3/UL (ref 0–0.2)
BASOPHILS NFR BLD AUTO: 1 %
BENZODIAZ UR QL SCN: NEGATIVE NG/ML
BILIRUB SERPL-MCNC: 0.4 MG/DL (ref 0–1.2)
BUN SERPL-MCNC: 11 MG/DL (ref 6–20)
BUN/CREAT SERPL: 12 (ref 9–23)
BZE UR QL SCN: NEGATIVE NG/ML
CALCIUM SERPL-MCNC: 9.5 MG/DL (ref 8.7–10.2)
CANNABINOIDS UR QL SCN: POSITIVE NG/ML
CHLORIDE SERPL-SCNC: 102 MMOL/L (ref 96–106)
CHOLEST SERPL-MCNC: 181 MG/DL (ref 100–199)
CO2 SERPL-SCNC: 25 MMOL/L (ref 20–29)
CREAT SERPL-MCNC: 0.93 MG/DL (ref 0.57–1)
CREAT UR-MCNC: 156.8 MG/DL (ref 20–300)
EGFRCR SERPLBLD CKD-EPI 2021: 84 ML/MIN/1.73
EOSINOPHIL # BLD AUTO: 0.4 X10E3/UL (ref 0–0.4)
EOSINOPHIL NFR BLD AUTO: 5 %
ERYTHROCYTE [DISTWIDTH] IN BLOOD BY AUTOMATED COUNT: 12.9 % (ref 11.7–15.4)
GLOBULIN SER CALC-MCNC: 2.5 G/DL (ref 1.5–4.5)
GLUCOSE SERPL-MCNC: 87 MG/DL (ref 70–99)
HBA1C MFR BLD: 5.3 % (ref 4.8–5.6)
HCT VFR BLD AUTO: 39.6 % (ref 34–46.6)
HDLC SERPL-MCNC: 59 MG/DL
HGB BLD-MCNC: 13.3 G/DL (ref 11.1–15.9)
IMM GRANULOCYTES # BLD AUTO: 0 X10E3/UL (ref 0–0.1)
IMM GRANULOCYTES NFR BLD AUTO: 0 %
INSULIN SERPL-ACNC: 23.4 UIU/ML (ref 2.6–24.9)
LABORATORY COMMENT REPORT: ABNORMAL
LDLC SERPL CALC-MCNC: 110 MG/DL (ref 0–99)
LYMPHOCYTES # BLD AUTO: 3.2 X10E3/UL (ref 0.7–3.1)
LYMPHOCYTES NFR BLD AUTO: 44 %
MCH RBC QN AUTO: 29.8 PG (ref 26.6–33)
MCHC RBC AUTO-ENTMCNC: 33.6 G/DL (ref 31.5–35.7)
MCV RBC AUTO: 89 FL (ref 79–97)
METHADONE UR QL SCN: NEGATIVE NG/ML
MONOCYTES # BLD AUTO: 0.5 X10E3/UL (ref 0.1–0.9)
MONOCYTES NFR BLD AUTO: 7 %
NEUTROPHILS # BLD AUTO: 3.1 X10E3/UL (ref 1.4–7)
NEUTROPHILS NFR BLD AUTO: 43 %
OPIATES UR QL SCN: NEGATIVE NG/ML
OXYCODONE+OXYMORPHONE UR QL SCN: NEGATIVE NG/ML
PCP UR QL: NEGATIVE NG/ML
PH UR: 6 [PH] (ref 4.5–8.9)
PLATELET # BLD AUTO: 249 X10E3/UL (ref 150–450)
POTASSIUM SERPL-SCNC: 4.6 MMOL/L (ref 3.5–5.2)
PROPOXYPH UR QL SCN: NEGATIVE NG/ML
PROT SERPL-MCNC: 6.9 G/DL (ref 6–8.5)
RBC # BLD AUTO: 4.46 X10E6/UL (ref 3.77–5.28)
SODIUM SERPL-SCNC: 143 MMOL/L (ref 134–144)
T3FREE SERPL-MCNC: 3.4 PG/ML (ref 2–4.4)
TRIGL SERPL-MCNC: 64 MG/DL (ref 0–149)
TSH SERPL DL<=0.005 MIU/L-ACNC: 2.93 UIU/ML (ref 0.45–4.5)
VLDLC SERPL CALC-MCNC: 12 MG/DL (ref 5–40)
WBC # BLD AUTO: 7.2 X10E3/UL (ref 3.4–10.8)

## 2023-01-26 PROCEDURE — 99214 OFFICE O/P EST MOD 30 MIN: CPT | Performed by: NURSE PRACTITIONER

## 2023-01-26 NOTE — TREATMENT PLAN
Multi-Disciplinary Problems (from Behavioral Health Treatment Plan)    Active Problems     Problem: Anxiety  Start Date: 01/26/23    Problem Details: The patient self-scales this problem as a 2 with 10 being the worst.        Goal Priority Start Date Expected End Date End Date    Patient will develop and implement behavioral and cognitive strategies to reduce anxiety and irrational fears. -- 01/26/23 -- --    Goal Details: Progress toward goal:  The patient self-scales their progress related to this goal as a 2 with 10 being the worst.        Goal Intervention Frequency Start Date End Date    Help patient explore past emotional issues in relation to present anxiety. Q Month 01/26/23 --    Intervention Details: Duration of treatment until until discharged.        Goal Intervention Frequency Start Date End Date    Help patient develop an awareness of their cognitive and physical responses to anxiety. Q Month 01/26/23 --    Intervention Details: Duration of treatment until until discharged.              Problem: Mood Instability  Start Date: 01/26/23    Problem Details: The patient self-scales this problem as a 3 with 10 being the worst.        Goal Priority Start Date Expected End Date End Date    Patient will achieve mood stability as evidenced by controlled behavior and a more deliberate thought process -- 01/26/23 -- --    Goal Details: Progress toward goal:  The patient self-scales their progress related to this goal as a 3 with 10 being the worst.        Goal Intervention Frequency Start Date End Date    Provide structure and focus to patient's thoughts and actions by establishing plans and routine. Q Month 01/26/23 --    Intervention Details: Duration of treatment until until discharged.        Goal Intervention Frequency Start Date End Date    Assist patient in setting responsible goals and limits in behavior. Q Month 01/26/23 --    Intervention Details: Duration of treatment until until discharged.                            I have discussed and reviewed this treatment plan with the patient and/or guardian.  The patient has verbally agreed with this treatment plan (no signatures are obtained at today's visit as the patient is a telehealth patient and is unable to print and sign this document, therefore verbal agreement is obtained).

## 2023-01-26 NOTE — PROGRESS NOTES
This provider is located at the [facility] (through [facility]), [address], [address] using a telephone in a secure private environment. The Patient is seen remotely at their home address in [place], using a private telephone.  The patient is unable to be seen through a MyChart Video Visit through [facility] at today's encounter because pt was not able to hear this APRN, therefore a telephone encounter was conducted. The patient is being evaluated/treated via telehealth by telephone, and stated they are in a secure environment for this session. The patient's condition being diagnosed/treated is appropriate for telemedicine. The provider identified herself as well as her credentials.   The patient, and/or patient's guardian, consent to be seen remotely, and when consent is given they understand that the consent allows for patient identifiable information to be sent to a third party as needed.   They may refuse to be seen remotely at any time. The electronic data is encrypted and password protected, and the patient and/or guardian has been advised of the potential risks to privacy not withstanding such measures.    This visit has been rescheduled as a phone visit to comply with patient safety concerns in accordance with CDC recommendations. Total time of discussion was 12 minutes.    You have chosen to receive care through a telephone visit. Do you consent to use a telephone visit for your medical care today? Yes    Patient identifiers utilized: Name and date of birth.          Subjective   Franca Ruiz is a 32 y.o. female who presents today for follow up    Chief Complaint:  Bipolar disorder, anxiety    Accompanied by: Pt was alone for duration of appointment    History of Present Illness:   Pt reports she is doing well overall. Pt has been in school since last semester and continues to work day shift at her job. Pt does most of her school work on the weekends. Pt  recently needed a statement from this APRN for school stating pt was under her care. Pt reports she called multiple times and verbalized agitation that this APRN did not receive the message. Pt had to get her Suboxone MD to write it. Pt found out she is able to keep Medicaid insurance. Sleep is stable on her CPAP. Appetite is stable as is mood. Pt has maintained sobriety, it was 2 years on 1/21/23, and continues to go to the Suboxone clinic. The patient denies any new medical problems or changes in medications since last appointment with this facility. The patient reports compliance with current medication regimen. The patient denies any current side effects from their current medication regimen. The patient denies any abnormal muscle movements or tics. The patient rates their depression on average in the past week at a 3/10 on a 0-10 scale, with 10 being the worst. The patient rates their anxiety on average the past week at a 2/10 on a 0-10 scale, with 10 being the worst. The patient would like to not adjust or change their medications at this visit. The patient denies any suicidal or homicidal ideations, plans, or intent at today's encounter and is convincing. The patient denies any auditory hallucinations or visual hallucinations. The patient does not endorse any significant symptoms consistent with leidy or psychosis at today's encounter.     *If the patient has any concerns or needs assistance, they may call the [facility] at [phone]*          Prior Psychiatric Medications:  Wellbutrin XL: is helpful  Naltrexone: is helpful   Hydroxyzine: helps, but is sedating  Trazodone: takes PRN and is helpful  Buspar: unsure if effective  Depakote ER: may be beneficial  Paxil:   Zoloft  Celexa  Prozac: may have been helpful  Cymbalta: may have been helpful  Seroquel: side effects; too sedating  Abilify: sedating  Vraylar: helpful        The following portions of the patient's history were  reviewed and updated as appropriate: allergies, current medications, past family history, past medical history, past social history, past surgical history and problem list.          Past Medical History:  Past Medical History:   Diagnosis Date   • Allergic     Seasonal, controlled with 2nd gen antihistamine   • Anxiety    • Arthritis    • Bilateral ovarian cysts    • Bipolar disorder (HCC)     managed by behavioral health, [facility]   • Chronic pain    • Depression    • Obesity    • Seizures (HCC)     denied by patient   • Sleep apnea     CPAP, managed by [facility]   • Substance abuse (HCC)     managed by [facility]       Social History:  Social History     Socioeconomic History   • Marital status: Single   Tobacco Use   • Smoking status: Former     Packs/day: 1.00     Years: 10.00     Pack years: 10.00     Types: Cigarettes, Electronic Cigarette     Start date: 6/1/2007     Quit date: 1/1/2020     Years since quitting: 3.0   • Smokeless tobacco: Never   Vaping Use   • Vaping Use: Every day   • Substances: Nicotine, Flavoring   Substance and Sexual Activity   • Alcohol use: Never   • Drug use: Not Currently     Types: Amphetamines, Benzodiazepines, Fentanyl, Hydrocodone, Marijuana, MDMA (ecstacy), Methamphetamines, Morphine, Oxycodone, PCP     Comment: January 21, 2021 (Last use)   • Sexual activity: Not Currently     Partners: Male     Birth control/protection: Condom, Abstinence       Family History:  Family History   Problem Relation Age of Onset   • Mental illness Mother    • Arthritis Father    • Hypertension Father    • Mental illness Father    • Obesity Father    • Bipolar disorder Father    • Alcohol abuse Father    • Sleep apnea Father    • Kidney disease Paternal Aunt    • Mental illness Paternal Aunt    • Bipolar disorder Paternal Aunt    • Cancer Paternal Aunt         unspecified   • Mental illness Paternal Uncle    • Cancer Paternal Uncle         unspecified   • Cancer Paternal Grandmother          unspecified   • Sleep apnea Other    • Asthma Other    • COPD Other    • Restless legs syndrome Other        Past Surgical History:  Past Surgical History:   Procedure Laterality Date   • ANKLE SURGERY Right 2013   • CARPAL TUNNEL RELEASE Left 04/2022   • CARPAL TUNNEL RELEASE Right 07/2022   • WISDOM TOOTH EXTRACTION         Problem List:  Patient Active Problem List   Diagnosis   • Moderate episode of recurrent major depressive disorder (HCC)   • Sleep apnea   • Bilateral carpal tunnel syndrome   • Morbid obesity with BMI of 50.0-59.9, adult ([facility])   • History of substance abuse ([facility])   • Bipolar disorder ([facility])   • Substance abuse ([facility])   • Sleep apnea       Allergy:   Allergies   Allergen Reactions   • Augmentin [Amoxicillin-Pot Clavulanate] Other (See Comments)     Throat blisters   • [place] Flavor [Flavoring Agent] Unknown - Low Severity   • Vantin [Cefpodoxime] Other (See Comments)     Throat blisters        Current Medications:   Current Outpatient Medications   Medication Sig Dispense Refill   • buprenorphine-naloxone (SUBOXONE) 8-2 MG per SL tablet 1 tablet 2 (Two) Times a Day.     • buPROPion XL (Wellbutrin XL) 300 MG 24 hr tablet Take 1 tablet by mouth Every Morning. 90 tablet 0   • busPIRone (BUSPAR) 15 MG tablet Take 1 tablet by mouth 2 (Two) Times a Day. 180 tablet 0   • Cariprazine HCl (Vraylar) 3 MG capsule capsule Take 1 capsule by mouth Daily. 90 capsule 0   • metroNIDAZOLE (Flagyl) 500 MG tablet Take 1 tablet by mouth 2 (Two) Times a Day. 14 tablet 0     No current facility-administered medications for this visit.       Review of Symptoms:    Review of Systems   Constitutional: Negative.    Psychiatric/Behavioral: Positive for stress.         Physical Exam:   Due to the remote nature of this encounter (virtual encounter), vitals were unable to be obtained.  Height stated at 61 inches.  Weight stated at 297 pounds.        Physical Exam  Neurological:      Mental Status: She is alert.   Psychiatric:          Attention and Perception: Attention and perception normal. She does not perceive auditory or visual hallucinations.         Mood and Affect: Mood normal.         Speech: Speech normal.         Behavior: Behavior is cooperative.         Thought Content: Thought content normal. Thought content is not paranoid or delusional. Thought content does not include homicidal or suicidal ideation. Thought content does not include homicidal or suicidal plan.         Cognition and Memory: Cognition and memory normal.         Judgment: Judgment normal.      Comments: Unable to assess affect and behavior           Mental Status Exam:   Hygiene:   Unable to assess  Cooperation:  Cooperative  Eye Contact:  Unable to assess  Psychomotor Behavior:  Unable to assess  Affect:  Unable to assess  Mood: normal  Speech:  Normal  Thought Process:  Goal directed  Thought Content:  Normal  Suicidal:  None  Homicidal:  None  Hallucinations:  None  Delusion:  None  Memory:  Intact  Orientation:  Person, Place, Time and Situation  Reliability:  good  Insight:  Good  Judgement:  Good  Impulse Control:  Good  Physical/Medical Issues:  No          Previous Provider notes and available records reviewed by this APRN at today's encounter.         Lab Results:   No visits with results within 1 Month(s) from this visit.   Latest known visit with results is:   Lab on 12/12/2022   Component Date Value Ref Range Status   • Chlamydia trachomatis, SCOTT 12/12/2022 Negative  Negative Final   • Gonococcus by SCOTT 12/12/2022 Negative  Negative Final   • Trichomonas vaginosis 12/12/2022 Negative  Negative Final         Assessment & Plan   Problems Addressed this Visit    None  Visit Diagnoses     Bipolar I disorder, most recent episode depressed (HCC)  (Chronic)   -  Primary    Generalized anxiety disorder  (Chronic)       Amphetamine-type substance use disorder, moderate, in sustained remission (HCC)          Diagnoses       Codes Comments    Bipolar I  disorder, most recent episode depressed (HCC)    -  Primary ICD-10-CM: F31.30  ICD-9-CM: 296.50     Generalized anxiety disorder     ICD-10-CM: F41.1  ICD-9-CM: 300.02     Amphetamine-type substance use disorder, moderate, in sustained remission (HCC)     ICD-10-CM: F15.21  ICD-9-CM: 304.43           Visit Diagnoses:    ICD-10-CM ICD-9-CM   1. Bipolar I disorder, most recent episode depressed (HCC)  F31.30 296.50   2. Generalized anxiety disorder  F41.1 300.02   3. Amphetamine-type substance use disorder, moderate, in sustained remission (HCC)  F15.21 304.43          GOALS:  Short Term Goals: Patient will be compliant with medication, and patient will have no significant medication related side effects.  Patient will be engaged in psychotherapy as indicated.  Patient will report subjective improvement of symptoms.  Long term goals: To stabilize mood and treat/improve subjective symptoms, the patient will stay out of the hospital, the patient will be at an optimal level of functioning, and the patient will take all medications as prescribed.  The patient verbalized understanding and agreement with goals that were mutually set.      TREATMENT PLAN:   -Continue Buspar 15 mg PO BID for anxiety.   -Continue Vraylar 3 mg PO Daily for bipolar disorder  -Pt is prescribed Suboxone from another provider  -Continue Wellbutrin  mg PO QAM for bipolar depression    *Discussed the importance of staying on Vraylar while taking Wellbutrin XL. Pt to call if she experiences any symptoms of leidy/hypomania. Pt has previously done well on this combination of medication. Pt verbalized understanding*    Medication and treatment options, both pharmacological and non-pharmacological treatment options, discussed during today's visit, including any off label use of medication. Patient acknowledged and verbally consented with current treatment plan and was educated on the importance of compliance with treatment and follow-up  appointments.        MEDICATION ISSUES:    Discussed treatment plan and medication options of prescribed medication as well as the risks, benefits, any black box warnings, and side effects including potential falls, possible impaired driving, and metabolic adversities among others, including any off label use of medication. Patient is agreeable to call the office with any worsening of symptoms or onset of side effects, or if any concerns or questions arise.  The contact information for the office is made available to the patient. Patient is agreeable to call 911 or go to the nearest ER should they begin having any SI/HI, or if any urgent concerns arise.       SUICIDE RISK ASSESSMENT: Unalterable demographics and a history of mental health intervention indicate this patient is in a high risk category compared to the general population. At present, the patient denies active SI/HI, intentions, or plans at this time and agrees to seek immediate care should such thoughts develop. The patient verbalizes understanding of how to access emergency care if needed and agrees to do so. Consideration of suicide risk and protective factors such as history, current presentation, individual strengths and weaknesses, psychosocial and environmental stressors and variables, psychiatric illness and symptoms, medical conditions and pain, took place in this interview. Based on those considerations, the patient is determined: within individual baseline and presenting no imminent risk for suicide or homicide. Other recommendations: The patient does not meet the criteria for inpatient admission and is not a safety risk to self or others at today's visit. Inpatient treatment offers no significant advantages over outpatient treatment for this patient at today's visit.      SAFETY PLAN:  Patient was given ample time for questions and fully participated in treatment planning.  Patient was encouraged to call the clinic with any questions or  concerns.  Patient was informed of access to emergency care. If patient were to develop any significant symptomatology, suicidal ideation, homicidal ideation, any concerns, or feel unsafe at any time they are to call the clinic and if unable to get immediate assistance should immediately call 911 or go to the nearest emergency room.  The patient is advised to remove or secure (lock away) all lethal weapons (including guns) and sharps (including razors, scissors, knives, etc.).  All medications (including any prescribed and any over the counter medications) should be stored in a safe and secured location that is not obtainable by children/adolescents.  Patient was given an opportunity and encouraged to ask questions about their medication, illness, and treatment. Patient contracted verbally for the following: If you are experiencing an emotional crisis or have thoughts of harming yourself or others, please go to your nearest local emergency room or call 911. Will continue to re-assess medication response and side effects frequently to establish efficacy and ensure safety. Risks, any black box warnings, side effects, off label usage, and benefits of medication and treatment discussed with patient, along with potential adverse side effects of current and/or newly prescribed medication, alternative treatment options, and OTC medications.  Patient verbalized understanding of potential risks, any off label use of medication, any black box warnings, and any side effects in their own words. The patient verbalized understanding and agreed to comply with the safety plan discussed in their own words.  Patient given the number to the office. Number also available to the 24- hour suicide hotline.      MEDS ORDERED DURING VISIT:  No orders of the defined types were placed in this encounter.      Return in about 8 weeks (around 3/23/2023), or if symptoms worsen or fail to improve, for Recheck.     Treatment plan completed:  1/26/23    Progress toward goal: Not at goal    Functional Status: Mild impairment     Prognosis: Good with Ongoing Treatment         This document has been electronically signed by JENNIFER Reed  January 26, 2023 08:57 EST     Some of the data in this electronic note has been brought forward from a previous encounter, any necessary changes have been made, it has been reviewed by this APRN, and it is accurate.    Please note that portions of this note were completed with a voice recognition program. Efforts were made to edit dictation, but occasionally words are mistranscribed.

## 2023-02-07 ENCOUNTER — OFFICE VISIT (OUTPATIENT)
Dept: BARIATRICS/WEIGHT MGMT | Facility: CLINIC | Age: 33
End: 2023-02-07
Payer: COMMERCIAL

## 2023-02-07 VITALS
SYSTOLIC BLOOD PRESSURE: 140 MMHG | HEIGHT: 60 IN | HEART RATE: 80 BPM | WEIGHT: 293 LBS | DIASTOLIC BLOOD PRESSURE: 70 MMHG | OXYGEN SATURATION: 95 % | BODY MASS INDEX: 57.52 KG/M2

## 2023-02-07 DIAGNOSIS — E66.01 MORBID OBESITY WITH BMI OF 50.0-59.9, ADULT: Primary | ICD-10-CM

## 2023-02-07 PROBLEM — E88.819 INSULIN RESISTANCE: Status: ACTIVE | Noted: 2023-02-07

## 2023-02-07 PROBLEM — E88.81 INSULIN RESISTANCE: Status: ACTIVE | Noted: 2023-02-07

## 2023-02-07 PROBLEM — E78.00 PURE HYPERCHOLESTEROLEMIA: Status: ACTIVE | Noted: 2023-02-07

## 2023-02-07 PROCEDURE — 96372 THER/PROPH/DIAG INJ SC/IM: CPT | Performed by: NURSE PRACTITIONER

## 2023-02-07 PROCEDURE — 99215 OFFICE O/P EST HI 40 MIN: CPT | Performed by: NURSE PRACTITIONER

## 2023-02-07 RX ORDER — CYANOCOBALAMIN 1000 UG/ML
1000 INJECTION, SOLUTION INTRAMUSCULAR; SUBCUTANEOUS
Status: SHIPPED | OUTPATIENT
Start: 2023-02-07

## 2023-02-07 RX ORDER — TOPIRAMATE 25 MG/1
TABLET ORAL
Qty: 60 TABLET | Refills: 0 | Status: SHIPPED | OUTPATIENT
Start: 2023-02-07 | End: 2023-03-07

## 2023-02-07 RX ADMIN — CYANOCOBALAMIN 1000 MCG: 1000 INJECTION, SOLUTION INTRAMUSCULAR; SUBCUTANEOUS at 16:07

## 2023-02-07 NOTE — PROGRESS NOTES
[facility]  [address]  [address]  [place] [address]     Office Note      Date: 2023  Patient Name: Franca Ruiz  MRN: [id number]  : 1990  Subjective  Subjective     Chief Complaint  Obesity Management follow-up          Franca Ruiz presents to [facility] for obesity management.   Patient is unsatisfied with weight loss progress. Appetite is poorly controlled. Reports no side effects of prescribed medications today. The patient is taking multivitamin and is taking fish oil.  The patient is using a food journal.  The patient rates current efforts as 5 out of 10.  The patient is exercising with a FITT score of:    Frequency Intensity Time Strength Training   []   0, none []   0 []   0 [x]   0   [x]   1 (1-2x/week) [x]   1 (light) []   1 (<10 min) []   1 (1x/week)   []   2 (3-5x/week) []   2 (moderate) [x]   2 (10-20 min) []   2 (2x/week)   []   3 (daily) []   3 (moderately hard)  []   4 (very hard) []   3 (20-30 min)  []   4 (>30 min) []   3 (3-4x/week)     Review of Systems   Constitutional: Negative for appetite change and fatigue.   Eyes: Negative for blurred vision, double vision and visual disturbance.   Cardiovascular: Negative for chest pain and palpitations.   Gastrointestinal: Negative for abdominal pain, constipation, diarrhea, nausea, vomiting and GERD.   Endocrine: Negative for polydipsia, polyphagia and polyuria.   Musculoskeletal: Negative for arthralgias, back pain and myalgias.   Neurological: Negative for dizziness, tremors, light-headedness, headache and memory problem.        Parasthesias negative   Psychiatric/Behavioral: Negative for sleep disturbance, depressed mood and stress. The patient is not nervous/anxious.        Objective   Start weight: 302.8 pounds.    Total Loss lb/%Loss of beginning body weight (BBW): -2.8lb/-0.92%  Change in weight since last visit: -2.8    Recent Weight History:   Wt  "Readings from Last 6 Encounters:   02/07/23 136 kg (300 lb)   01/24/23 (!) 137 kg (302 lb 12.8 oz)   12/12/22 136 kg (299 lb)   11/29/22 135 kg (297 lb)   03/14/22 135 kg (296 lb 12.8 oz)   01/25/22 135 kg (297 lb 9.9 oz)       Body mass index is 58.59 kg/m².   Body composition analysis completed and showed:   %body fat: 52.1  Measurements (in inches)  Waist: 44    Vital Signs:   /70   Pulse 80   Ht 152.4 cm (60\")   Wt 136 kg (300 lb)   SpO2 95%   BMI 58.59 kg/m²       Physical Exam  Vitals reviewed.   Constitutional:       Appearance: She is obese. She is not ill-appearing.   HENT:      Head:      Comments: masked  Pulmonary:      Effort: Pulmonary effort is normal.   Neurological:      Mental Status: She is alert.   Psychiatric:         Attention and Perception: Attention and perception normal.         Behavior: Behavior is cooperative.        Result Review :              Assessment / Plan        Diagnoses and all orders for this visit:    1. Morbid obesity with BMI of 50.0-59.9, adult (HCC) (Primary)  Assessment & Plan:  Patient's (Body mass index is 58.59 kg/m².) indicates that they are obese (BMI >30) with health conditions that include obstructive sleep apnea, dyslipidemias and osteoarthritis . Weight is improving with lifestyle modifications. BMI  is above average; BMI management plan is completed. We discussed low calorie, low carb based diet program, portion control, increasing exercise, pharmacologic options including Toperimate and an diana-based approach such as Livevol Pal or Lose It.     -- This is a follow-up visit and patient is down just shy of 3 pounds since last office visit approximately 2 weeks ago  -- We had set a goal of daily journaling which she has an excellent job of only missing around 1 day.  Her 1 goal over the next month is to continue this process.  Again emphasizing that were not always looking for perfection but again just being very honest with what we are eating on a " day-to-day basis.  Continue to bring in food journal to next office visit for brief review.  -- Goal to increase our protein-based breakfast and decrease are carb-based breakfast in particular are small glazed donuts.  We did discuss things such as frozen egg bites.  -- She did bring in her medication eligibility sheet which shows the Saxenda, Wegovy, Contrave, Qsymia, Lomaira not covered.  She was advised by her recovery clinic that phentermine could affect her Suboxone treatment.  Advised that we will not start this without clearance from the clinic.  -- Start Topamax.  Prescription sent in.  -- Please set a water goal of 100 ounces and she feels that she has increased her water intake more than she has in the past.  Keep up this great effort.  Be mindful to continue to work on decreasing high sugar/carbohydrate drinks.  -- Keep looking at foods even if they look healthy.  For example she ate a cereal advertises healthy and ended up having over 100 carbs in the serving she ate.  Keep up the great effort on food journaling.  --Explore adding more vegtables into diet.   -- Labs reviewed we did discuss mildly elevated cholesterol and discuss mild insulin resistance is current with a Amber IR score 5.0.  Discussed the importance of continuing to keep her carbs lower protein high    Orders:  -     psyllium (METAMUCIL) 0.52 g capsule; Take 2 capsules by mouth 2 (Two) Times a Day. Titrate up as directed. Take with a minimum of 8oz water.  Dispense: 120 capsule; Refill: 11  -     topiramate (Topamax) 25 MG tablet; Take one daily at bedtime for a week then increase to 2 daily at bedtime  Dispense: 60 tablet; Refill: 0  -     cyanocobalamin injection 1,000 mcg      We discussed the risks, benefits, and limitations of treatments. Continue medications and OTC supplements as discussed. Patient verbalizes understanding of and agreement with management plan.     Follow Up   Return in about 4 weeks (around 3/7/2023).  Patient was  given instructions and counseling regarding her condition or for health maintenance advice. Please see specific information pulled into the AVS if appropriate.     I spent 40 minutes on this date of service. This time includes time spent by me in the following activities:preparing for the visit, counseling and educating the patient/family/caregiver, ordering medications, tests, or procedures and documenting information in the medical record.    Gracy Singh, JENNIFER  02/07/2023

## 2023-02-07 NOTE — PROGRESS NOTES
"[facility]   [address]  [place] [address]  [phone]    Name: Franca Ruiz  : 1990  Patient Care Team:  Amisha Potts DO as PCP - General (Internal Medicine)    Chief Complaint;: No chief complaint on file.       Patient presents during the COVID-19 pandemic/federally declared state of public health emergency.   This service was conducted via Zoom. Patient is being seen via telehealth service due to current public health emergency.     Virtual visit for Franca Ruiz, a 32 y.o. female, established patient for medical weight loss.     Patient is {Satisfied/unsatisfied/unsure:24194} with weight loss progress  Hunger control has {IMPROVED:61554} The patient {is / IS NOT:56604::\"is not\"} exercising. The patient {is / IS NOT:77055::\"is not\"} using a food journal. The patient {is / IS NOT:73072::\"is not\"} checking weight regularly. The patient rates current efforts as *** out of 10. There is no height or weight on file to calculate BMI. and has not reached treatment goal.     Review of Systems    The patient is exercising with a FITT score of:    Frequency   Intensity Time Strength Training   []   0 None  []   0 None  []   0 None  []   0 None    []   1 (1-2x/week) []   1 (light) []   1 (<10 min) []   1 (1x/week)   []   2 (3-5x/week) []   2 (moderate) []   2 (10-20 min) []   2 (2x/week)   []   3 (daily)   []   3 (moderately hard)  []   4 (very hard) []   3 (20-30 min)  []   4 (>30 min) []   3 (3-4x/week)       Recent Weight History:   Wt Readings from Last 6 Encounters:   23 (!) 137 kg (302 lb 12.8 oz)   22 136 kg (299 lb)   22 135 kg (297 lb)   22 135 kg (296 lb 12.8 oz)   22 135 kg (297 lb 9.9 oz)   22 135 kg (297 lb 6.4 oz)       Today's weight: ***  Last office visit weight: ***  Change in weight since last visit: ***  Start Weight: ***  Total Loss: ***  %Loss of BBW:***     VS   There were no " "vitals filed for this visit.    Measurements (in inches)  Waist: ***    Physical Exam:    General:  .{CR New patient general:08330::\"well developed; well nourished\",\"no acute distress\",\"obese \"}     ASSESSMENT/PLAN:   There are no diagnoses linked to this encounter.    I have instructed the patient to continue with pursuit of medical weight loss as a part of this program. Patient {Does(not):75161::\"does\"} meet criteria for use of anorectics at this time as {criteria:36860::\"BMI >27\"}. Continue nutritional focus and work towards new exercise FITT goal of:     Frequency   Intensity Time Strength Training   []   0 None  []   0 None  []   0 None  []   0 None    []   1 (1-2x/week) []   1 (light) []   1 (<10 min) []   1 (1x/week)   []   2 (3-5x/week) []   2 (moderate) []   2 (10-20 min) []   2 (2x/week)   []   3 (daily)   []   3 (moderately hard)  []   4 (very hard) []   3 (20-30 min)  []   4 (>30 min) []   3 (3-4x/week)       The current plan for this month includes: {WeightLossInst:86949}    Note: This was an audio and video enabled telemedicine encounter to comply with physical distancing guidelines during the COVID-19 pandemic.  Consent for televisit was obtained prior to the visit. Refills of controlled substances are being provided in this context because of the state of emergency and current social distancing guidance due to COVID-19. We will resume face-to-face visits as permitted and as advised by public health officials.     I spent *** minutes on this date of service. This time includes time spent by me in the following activities:preparing for the visit, counseling and educating the patient/family/caregiver, ordering medications, tests, or procedures and documenting information in the medical record.    Plan of care reviewed with the patient at the conclusion of today's visit. We discussed the risks, benefits, and limitations of treatments. Continue medications and OTC supplements as discussed. Patient " verbalizes understanding of and agreement with management plan.      No follow-ups on file.      Gracy Singh, APRN

## 2023-02-07 NOTE — ASSESSMENT & PLAN NOTE
Patient's (Body mass index is 58.59 kg/m².) indicates that they are obese (BMI >30) with health conditions that include obstructive sleep apnea, dyslipidemias and osteoarthritis . Weight is improving with lifestyle modifications. BMI  is above average; BMI management plan is completed. We discussed low calorie, low carb based diet program, portion control, increasing exercise, pharmacologic options including Toperimate and an diana-based approach such as Etaoshi Pal or Lose It.     -- This is a follow-up visit and patient is down just shy of 3 pounds since last office visit approximately 2 weeks ago  -- We had set a goal of daily journaling which she has an excellent job of only missing around 1 day.  Her 1 goal over the next month is to continue this process.  Again emphasizing that were not always looking for perfection but again just being very honest with what we are eating on a day-to-day basis.  Continue to bring in food journal to next office visit for brief review.  -- Goal to increase our protein-based breakfast and decrease are carb-based breakfast in particular are small glazed donuts.  We did discuss things such as frozen egg bites.  -- She did bring in her medication eligibility sheet which shows the Saxenda, Wegovy, Contrave, Qsymia, Lomaira not covered.  She was advised by her recovery clinic that phentermine could affect her Suboxone treatment.  Advised that we will not start this without clearance from the clinic.  -- Start Topamax.  Prescription sent in.  -- Please set a water goal of 100 ounces and she feels that she has increased her water intake more than she has in the past.  Keep up this great effort.  Be mindful to continue to work on decreasing high sugar/carbohydrate drinks.  -- Keep looking at foods even if they look healthy.  For example she ate a cereal advertises healthy and ended up having over 100 carbs in the serving she ate.  Keep up the great effort on food journaling.  --Explore  adding more vegtables into diet.   -- Labs reviewed we did discuss mildly elevated cholesterol and discuss mild insulin resistance is current with a Amber IR score 5.0.  Discussed the importance of continuing to keep her carbs lower protein high

## 2023-02-12 RX ORDER — ERGOCALCIFEROL 1.25 MG/1
50000 CAPSULE ORAL WEEKLY
Qty: 8 CAPSULE | Refills: 0 | Status: SHIPPED | OUTPATIENT
Start: 2023-02-12 | End: 2023-02-14 | Stop reason: SDUPTHER

## 2023-02-14 ENCOUNTER — PATIENT MESSAGE (OUTPATIENT)
Dept: BARIATRICS/WEIGHT MGMT | Facility: CLINIC | Age: 33
End: 2023-02-14
Payer: COMMERCIAL

## 2023-02-14 RX ORDER — ERGOCALCIFEROL 1.25 MG/1
50000 CAPSULE ORAL WEEKLY
Qty: 8 CAPSULE | Refills: 0 | Status: SHIPPED | OUTPATIENT
Start: 2023-02-14 | End: 2023-04-05

## 2023-02-14 NOTE — TELEPHONE ENCOUNTER
From: Franca Ruiz  To: Gracy Singh  Sent: 2/14/2023 10:08 AM EST  Subject: Question regarding vitamin D    Hey, I've called the office multiple times but no one ever picks up or gives a call back. On the [employer] message it says you called in a prescription of vitamin D to my pharmacy but they are not showing that anything has been sent in. It is the [facility] pharmacy on [facility]. My phone number is [phone]

## 2023-03-09 ENCOUNTER — TELEMEDICINE (OUTPATIENT)
Dept: BARIATRICS/WEIGHT MGMT | Facility: CLINIC | Age: 33
End: 2023-03-09
Payer: COMMERCIAL

## 2023-03-09 VITALS — WEIGHT: 288.6 LBS | BODY MASS INDEX: 56.66 KG/M2 | HEIGHT: 60 IN

## 2023-03-09 DIAGNOSIS — E66.01 MORBID OBESITY WITH BMI OF 50.0-59.9, ADULT: Primary | ICD-10-CM

## 2023-03-09 PROCEDURE — 99213 OFFICE O/P EST LOW 20 MIN: CPT | Performed by: NURSE PRACTITIONER

## 2023-03-09 RX ORDER — PHENTERMINE HYDROCHLORIDE 15 MG/1
15 CAPSULE ORAL EVERY MORNING
Qty: 30 CAPSULE | Refills: 0 | Status: SHIPPED | OUTPATIENT
Start: 2023-03-09 | End: 2023-04-08

## 2023-03-09 NOTE — ASSESSMENT & PLAN NOTE
Patient's (Body mass index is 56.36 kg/m².) indicates that they are obese (BMI >30) with health conditions that include obstructive sleep apnea, dyslipidemias and insuliin resistance . Weight is improving with treatment. BMI  is above average; BMI management plan is completed. We discussed low calorie, low carb based diet program, portion control, increasing exercise, pharmacologic options including phentermine and an diana-based approach such as Enervee Pal or Lose It.     --This is a follow up visit and she is down around 12 lbs  --Start phentermine. Advised this medication will show up on drug screenings.   --Journaled all days this past month. Continue goal to hit all days. Bring into visit for review  --Goal to walk at min 3x days at min 2 miles those days  --Restart topomax at 25mg and stop if she notice daytime fatigue.

## 2023-03-09 NOTE — PROGRESS NOTES
[facility]   [address]  [place] [address]  [phone]    Name: Franca Ruiz  : 1990  Patient Care Team:  Amisha Potts DO as PCP - General (Internal Medicine)    Chief Complaint;:   Chief Complaint   Patient presents with   • Obesity   • Follow-up        Patient presents during the COVID-19 pandemic/federally declared state of public health emergency.   This service was conducted via Zoom. Patient is being seen via telehealth service due to current public health emergency.     Virtual visit for Franca Ruiz, a 32 y.o. female, established patient for medical weight loss.     Patient is satisfied with weight loss progress  Hunger control has improved The patient is exercising. The patient is using a food journal, journaled most days. The patient is checking weight regularly. The patient rates current efforts as 6 out of 10. Body mass index is 56.36 kg/m². and has not reached treatment goal. Spoke with Provider at [facility] and was advised it is okay to start phentermine.     Review of Systems   Constitutional: Negative for appetite change and fatigue.   Eyes: Negative for blurred vision, double vision and visual disturbance.   Cardiovascular: Negative for chest pain and palpitations.   Gastrointestinal: Negative for abdominal pain, constipation, diarrhea, nausea, vomiting and GERD.   Endocrine: Negative for polydipsia, polyphagia and polyuria.   Musculoskeletal: Negative for arthralgias, back pain and myalgias.   Neurological: Negative for dizziness, tremors, light-headedness, headache and memory problem.        Parasthesias negative   Psychiatric/Behavioral: Negative for sleep disturbance, depressed mood and stress. The patient is not nervous/anxious.        The patient is exercising with a FITT score of:    Frequency   Intensity Time Strength Training   []   0 None  []   0 None  []   0 None  []   0 None    []   1 (1-2x/week) []  "  1 (light) []   1 (<10 min) []   1 (1x/week)   []   2 (3-5x/week) []   2 (moderate) []   2 (10-20 min) []   2 (2x/week)   []   3 (daily)   []   3 (moderately hard)  []   4 (very hard) []   3 (20-30 min)  []   4 (>30 min) []   3 (3-4x/week)       Recent Weight History:   Wt Readings from Last 6 Encounters:   03/09/23 131 kg (288 lb 9.6 oz)   02/07/23 136 kg (300 lb)   01/24/23 (!) 137 kg (302 lb 12.8 oz)   12/12/22 136 kg (299 lb)   11/29/22 135 kg (297 lb)   03/14/22 135 kg (296 lb 12.8 oz)       Today's weight: 288  Last office visit weight: 302.8  Change in weight since last visit: -14.8  Start Weight: 302  Total Loss: -14.8  %Loss of BBW:4.6%     VS   Vitals:    03/09/23 1529   Weight: 131 kg (288 lb 9.6 oz)   Height: 152.4 cm (60\")       Physical Exam:    General:  .well developed; well nourished  no acute distress  obese      ASSESSMENT/PLAN:   Diagnoses and all orders for this visit:    1. Morbid obesity with BMI of 50.0-59.9, adult (HCC) (Primary)  Assessment & Plan:  Patient's (Body mass index is 56.36 kg/m².) indicates that they are obese (BMI >30) with health conditions that include obstructive sleep apnea, dyslipidemias and insuliin resistance . Weight is improving with treatment. BMI  is above average; BMI management plan is completed. We discussed low calorie, low carb based diet program, portion control, increasing exercise, pharmacologic options including phentermine and an diana-based approach such as Doujiao Pal or Lose It.     --This is a follow up visit and she is down around 12 lbs  --Start phentermine. Advised this medication will show up on drug screenings.   --Journaled all days this past month. Continue goal to hit all days. Bring into visit for review  --Goal to walk at min 3x days at min 2 miles those days  --Restart topomax at 25mg and stop if she notice daytime fatigue.       Orders:  -     phentermine 15 MG capsule; Take 1 capsule by mouth Every Morning for 30 days.  Dispense: 30 " capsule; Refill: 0      I have instructed the patient to continue with pursuit of medical weight loss as a part of this program. Patient does meet criteria for use of anorectics at this time as BMI >27.   Note: This was an audio and video enabled telemedicine encounter to comply with physical distancing guidelines during the COVID-19 pandemic.  Consent for televisit was obtained prior to the visit. Refills of controlled substances are being provided in this context because of the state of emergency and current social distancing guidance due to COVID-19. We will resume face-to-face visits as permitted and as advised by public health officials.     I spent 30 minutes on this date of service. This time includes time spent by me in the following activities:preparing for the visit, counseling and educating the patient/family/caregiver, ordering medications, tests, or procedures and documenting information in the medical record.    Plan of care reviewed with the patient at the conclusion of today's visit. We discussed the risks, benefits, and limitations of treatments. Continue medications and OTC supplements as discussed. Patient verbalizes understanding of and agreement with management plan.      Return in about 4 weeks (around 4/6/2023).      Gracy Singh, APRN

## 2023-03-20 ENCOUNTER — TELEMEDICINE (OUTPATIENT)
Dept: PSYCHIATRY | Facility: CLINIC | Age: 33
End: 2023-03-20
Payer: COMMERCIAL

## 2023-03-20 DIAGNOSIS — F15.21: Chronic | ICD-10-CM

## 2023-03-20 DIAGNOSIS — F41.1 GENERALIZED ANXIETY DISORDER: Chronic | ICD-10-CM

## 2023-03-20 DIAGNOSIS — F31.30 BIPOLAR I DISORDER, MOST RECENT EPISODE DEPRESSED: Primary | Chronic | ICD-10-CM

## 2023-03-20 PROCEDURE — 99214 OFFICE O/P EST MOD 30 MIN: CPT | Performed by: NURSE PRACTITIONER

## 2023-03-20 PROCEDURE — 1160F RVW MEDS BY RX/DR IN RCRD: CPT | Performed by: NURSE PRACTITIONER

## 2023-03-20 PROCEDURE — 1159F MED LIST DOCD IN RCRD: CPT | Performed by: NURSE PRACTITIONER

## 2023-03-20 RX ORDER — BUSPIRONE HYDROCHLORIDE 15 MG/1
15 TABLET ORAL 2 TIMES DAILY
Qty: 60 TABLET | Refills: 2 | Status: SHIPPED | OUTPATIENT
Start: 2023-03-20

## 2023-03-20 RX ORDER — BUPROPION HYDROCHLORIDE 300 MG/1
300 TABLET ORAL EVERY MORNING
Qty: 30 TABLET | Refills: 2 | Status: SHIPPED | OUTPATIENT
Start: 2023-03-20

## 2023-03-20 NOTE — PROGRESS NOTES
"This provider is located at the [facility] (through [facility]), [address], [place], [address] using a secure [employer] Video Visit through [employer]. Patient is being seen remotely via telehealth in [place], and stated they are in a secure environment for this session. The patient's condition being diagnosed/treated is appropriate for telemedicine. The provider identified herself as well as her credentials.   The patient, and/or patients guardian, consent to be seen remotely, and when consent is given they understand that the consent allows for patient identifiable information to be sent to a third party as needed.   They may refuse to be seen remotely at any time. The electronic data is encrypted and password protected, and the patient and/or guardian has been advised of the potential risks to privacy not withstanding such measures.    You have chosen to receive care through a telehealth visit.  Do you consent to use a video/audio connection for your medical care today? Yes    Patient identifiers utilized: Name and date of birth      Subjective   Franca Ruiz is a 32 y.o. female who presents today for follow up    Chief Complaint:  Bipolar disorder, anxiety    Accompanied by: Pt was alone for duration of appointment    History of Present Illness:   Pt reports she is doing \"alright\". Pt has been working a full-time and part-time job while taking three college courses. She is going to school for her Associate's in Applied Science. Pt believes she has three semesters left. 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 Pt feels she is balancing it all fairly well so far. Pt maintains sobriety; she has been sober over 2 years. Sleep is stable on her C-PAP. Pt reports the [facility] started her on phentermine and Topamax. She feels they are working fairly well; she has lost 18 lbs. Mood has been fairly stable. Denies " any episodes of leidy/hypomania or depression. The patient denies any new medical problems since last appointment with this facility. The patient reports compliance with current medication regimen. The patient denies any current side effects from their current medication regimen. The patient denies any abnormal muscle movements or tics. The patient rates their depression on average in the past week at a 2-3/10 on a 0-10 scale, with 10 being the worst. The patient rates their anxiety on average the past week at a 2-3/10 on a 0-10 scale, with 10 being the worst. The patient would like to not adjust or change their medications at this visit. The patient denies any suicidal or homicidal ideations, plans, or intent at today's encounter and is convincing. The patient denies any auditory hallucinations or visual hallucinations. The patient does not endorse any significant symptoms consistent with leidy or psychosis at today's encounter.     *If the patient has any concerns or needs assistance, they may call the [facility] at [phone]*        Prior Psychiatric Medications:  Wellbutrin XL: is helpful  Naltrexone: is helpful   Hydroxyzine: helps, but is sedating  Trazodone: takes PRN and is helpful  Buspar: unsure if effective  Depakote ER: may be beneficial  Paxil:   Zoloft  Celexa  Prozac: may have been helpful  Cymbalta: may have been helpful  Seroquel: side effects; too sedating  Abilify: sedating  Vraylar: helpful        The following portions of the patient's history were reviewed and updated as appropriate: allergies, current medications, past family history, past medical history, past social history, past surgical history and problem list.          Past Medical History:  Past Medical History:   Diagnosis Date   • Allergic     Seasonal, controlled with 2nd gen antihistamine   • Anxiety    • Arthritis    • Bilateral ovarian cysts    • Bipolar disorder (HCC)     managed by behavioral  [facility]   • Chronic pain    • Depression    • Obesity    • Seizures ([facility])     denied by patient   • Sleep apnea     CPAP, managed by [facility]   • Substance abuse ([facility])     [facility] by [facility]       Social History:  Social History     Socioeconomic History   • Marital status: Single   Tobacco Use   • Smoking status: Former     Types: Electronic Cigarette   • Smokeless tobacco: Never   Vaping Use   • Vaping Use: Every day   • Substances: Nicotine, Flavoring   Substance and Sexual Activity   • Alcohol use: Never   • Drug use: Not Currently     Types: Amphetamines, Benzodiazepines, Fentanyl, Hydrocodone, Marijuana, MDMA (ecstacy), Methamphetamines, Morphine, Oxycodone, PCP     Comment: January 21, 2021 (Last use)   • Sexual activity: Not Currently     Partners: Male     Birth control/protection: Condom, Abstinence       Family History:  Family History   Problem Relation Age of Onset   • Mental illness Mother    • Arthritis Father    • Hypertension Father    • Mental illness Father    • Obesity Father    • Bipolar disorder Father    • Alcohol abuse Father    • Sleep apnea Father    • Kidney disease Paternal Aunt    • Mental illness Paternal Aunt    • Bipolar disorder Paternal Aunt    • Cancer Paternal Aunt         unspecified   • Mental illness Paternal Uncle    • Cancer Paternal Uncle         unspecified   • Cancer Paternal Grandmother         unspecified   • Sleep apnea Other    • Asthma Other    • COPD Other    • Restless legs syndrome Other        Past Surgical History:  Past Surgical History:   Procedure Laterality Date   • ANKLE SURGERY Right 2013   • CARPAL TUNNEL RELEASE Left 04/2022   • CARPAL TUNNEL RELEASE Right 07/2022   • WISDOM TOOTH EXTRACTION         Problem List:  Patient Active Problem List   Diagnosis   • Moderate episode of recurrent major depressive disorder ([facility])   • Sleep apnea   • Bilateral carpal tunnel syndrome   • Morbid obesity with BMI of 50.0-59.9, adult ([facility])   • History of  substance abuse (HCC)   • Bipolar disorder (HCC)   • Substance abuse (HCC)   • Sleep apnea   • Insulin resistance   • Pure hypercholesterolemia       Allergy:   Allergies   Allergen Reactions   • Augmentin [Amoxicillin-Pot Clavulanate] Other (See Comments)     Throat blisters   • Boykin Flavor [Flavoring Agent] Unknown - Low Severity   • Vantin [Cefpodoxime] Other (See Comments)     Throat blisters        Current Medications:   Current Outpatient Medications   Medication Sig Dispense Refill   • buPROPion XL (Wellbutrin XL) 300 MG 24 hr tablet Take 1 tablet by mouth Every Morning. 30 tablet 2   • busPIRone (BUSPAR) 15 MG tablet Take 1 tablet by mouth 2 (Two) Times a Day. 60 tablet 2   • buprenorphine-naloxone (SUBOXONE) 8-2 MG per SL tablet 1 tablet 2 (Two) Times a Day.     • Cariprazine HCl (Vraylar) 3 MG capsule capsule Take 1 capsule by mouth Daily. 30 capsule 2   • phentermine 15 MG capsule Take 1 capsule by mouth Every Morning for 30 days. 30 capsule 0   • psyllium (METAMUCIL) 0.52 g capsule Take 2 capsules by mouth 2 (Two) Times a Day. Titrate up as directed. Take with a minimum of 8oz water. 120 capsule 11   • topiramate (Topamax) 25 MG tablet Take one daily at bedtime for a week then increase to 2 daily at bedtime 60 tablet 0   • vitamin D (ERGOCALCIFEROL) 1.25 MG (18336 UT) capsule capsule Take 1 capsule by mouth 1 (One) Time Per Week for 8 doses. 8 capsule 0     Current Facility-Administered Medications   Medication Dose Route Frequency Provider Last Rate Last Admin   • cyanocobalamin injection 1,000 mcg  1,000 mcg Intramuscular Q28 Days Gracy Singh APRN   1,000 mcg at 02/07/23 1607       Review of Symptoms:    Review of Systems   Constitutional: Positive for appetite change.   Psychiatric/Behavioral: Negative.          Physical Exam:   Due to the remote nature of this encounter (virtual encounter), vitals were unable to be obtained.  Height stated at 61 inches.  Weight stated at 288  pounds.        Physical Exam  Neurological:      Mental Status: She is alert.   Psychiatric:         Attention and Perception: Attention and perception normal.         Mood and Affect: Mood and affect normal.         Speech: Speech normal.         Behavior: Behavior normal. Behavior is cooperative.         Thought Content: Thought content normal. Thought content is not paranoid or delusional. Thought content does not include homicidal or suicidal ideation. Thought content does not include homicidal or suicidal plan.         Cognition and Memory: Cognition and memory normal.         Judgment: Judgment normal.           Mental Status Exam:   Hygiene:   good  Cooperation:  Cooperative  Eye Contact:  Fair  Psychomotor Behavior:  Appropriate  Affect:  Appropriate  Mood: normal  Speech:  Normal  Thought Process:  Goal directed  Thought Content:  Normal  Suicidal:  None  Homicidal:  None  Hallucinations:  None  Delusion:  None  Memory:  Intact  Orientation:  Person, Place, Time and Situation  Reliability:  good  Insight:  Good  Judgement:  Good  Impulse Control:  Good  Physical/Medical Issues:  No          Previous Provider notes and available records reviewed by this APRN at today's encounter.         Lab Results:   No visits with results within 1 Month(s) from this visit.   Latest known visit with results is:   Office Visit on 01/24/2023   Component Date Value Ref Range Status   • WBC 01/24/2023 7.2  3.4 - 10.8 x10E3/uL Final   • RBC 01/24/2023 4.46  3.77 - 5.28 x10E6/uL Final   • Hemoglobin 01/24/2023 13.3  11.1 - 15.9 g/dL Final   • Hematocrit 01/24/2023 39.6  34.0 - 46.6 % Final   • MCV 01/24/2023 89  79 - 97 fL Final   • MCH 01/24/2023 29.8  26.6 - 33.0 pg Final   • MCHC 01/24/2023 33.6  31.5 - 35.7 g/dL Final   • RDW 01/24/2023 12.9  11.7 - 15.4 % Final   • Platelets 01/24/2023 249  150 - 450 x10E3/uL Final   • Neutrophil Rel % 01/24/2023 43  Not Estab. % Final   • Lymphocyte Rel % 01/24/2023 44  Not Estab. % Final    • Monocyte Rel % 01/24/2023 7  Not Estab. % Final   • Eosinophil Rel % 01/24/2023 5  Not Estab. % Final   • Basophil Rel % 01/24/2023 1  Not Estab. % Final   • Neutrophils Absolute 01/24/2023 3.1  1.4 - 7.0 x10E3/uL Final   • Lymphocytes Absolute 01/24/2023 3.2 (H)  0.7 - 3.1 x10E3/uL Final   • Monocytes Absolute 01/24/2023 0.5  0.1 - 0.9 x10E3/uL Final   • Eosinophils Absolute 01/24/2023 0.4  0.0 - 0.4 x10E3/uL Final   • Basophils Absolute 01/24/2023 0.1  0.0 - 0.2 x10E3/uL Final   • Immature Granulocyte Rel % 01/24/2023 0  Not Estab. % Final   • Immature Grans Absolute 01/24/2023 0.0  0.0 - 0.1 x10E3/uL Final   • Glucose 01/24/2023 87  70 - 99 mg/dL Final   • BUN 01/24/2023 11  6 - 20 mg/dL Final   • Creatinine 01/24/2023 0.93  0.57 - 1.00 mg/dL Final   • EGFR Result 01/24/2023 84  >59 mL/min/1.73 Final   • BUN/Creatinine Ratio 01/24/2023 12  9 - 23 Final   • Sodium 01/24/2023 143  134 - 144 mmol/L Final   • Potassium 01/24/2023 4.6  3.5 - 5.2 mmol/L Final   • Chloride 01/24/2023 102  96 - 106 mmol/L Final   • Total CO2 01/24/2023 25  20 - 29 mmol/L Final   • Calcium 01/24/2023 9.5  8.7 - 10.2 mg/dL Final   • Total Protein 01/24/2023 6.9  6.0 - 8.5 g/dL Final   • Albumin 01/24/2023 4.4  3.8 - 4.8 g/dL Final   • Globulin 01/24/2023 2.5  1.5 - 4.5 g/dL Final   • A/G Ratio 01/24/2023 1.8  1.2 - 2.2 Final   • Total Bilirubin 01/24/2023 0.4  0.0 - 1.2 mg/dL Final   • Alkaline Phosphatase 01/24/2023 96  44 - 121 IU/L Final   • AST (SGOT) 01/24/2023 26  0 - 40 IU/L Final   • ALT (SGPT) 01/24/2023 28  0 - 32 IU/L Final   • Hemoglobin A1C 01/24/2023 5.3  4.8 - 5.6 % Final    Comment:          Prediabetes: 5.7 - 6.4           Diabetes: >6.4           Glycemic control for adults with diabetes: <7.0     • Insulin 01/24/2023 23.4  2.6 - 24.9 uIU/mL Final   • Total Cholesterol 01/24/2023 181  100 - 199 mg/dL Final   • Triglycerides 01/24/2023 64  0 - 149 mg/dL Final   • HDL Cholesterol 01/24/2023 59  >39 mg/dL Final   • VLDL  Cholesterol Kenan 01/24/2023 12  5 - 40 mg/dL Final   • LDL Chol Calc (NIH) 01/24/2023 110 (H)  0 - 99 mg/dL Final   • T3, Free 01/24/2023 3.4  2.0 - 4.4 pg/mL Final   • TSH 01/24/2023 2.930  0.450 - 4.500 uIU/mL Final   • Amphetamine, Urine Qual 01/24/2023 Negative  Ydisyc=4982 ng/mL Final   • Barbiturates Screen, Urine 01/24/2023 Negative  Hpjhyw=230 ng/mL Final   • Benzodiazepine Screen, Urine 01/24/2023 Negative  Tzkuex=842 ng/mL Final   • THC Screen, Urine 01/24/2023 Positive (A)  Cutoff=20 ng/mL Final   • Cocaine Screen, Urine 01/24/2023 Negative  Tbvfsd=749 ng/mL Final   • Opiate Screen, Urine 01/24/2023 Negative  Fjdhxt=509 ng/mL Final    Opiate test includes Codeine, Morphine, Hydromorphone, Hydrocodone.   • Oxycodone/Oxymorphone, Urine 01/24/2023 Negative  Xoalqt=633 ng/mL Final    Test includes Oxycodone and Oxymorphone   • Phencyclidine (PCP), Urine 01/24/2023 Negative  Cutoff=25 ng/mL Final   • Methadone Screen, Urine 01/24/2023 Negative  Tqnxus=645 ng/mL Final   • Propoxyphene Screen 01/24/2023 Negative  Rlsfdr=576 ng/mL Final   • Creatinine, Urine 01/24/2023 156.8  20.0 - 300.0 mg/dL Final   • pH, UA 01/24/2023 6.0  4.5 - 8.9 Final   • Please note 01/24/2023 Comment   Final    Comment: This assay provides a preliminary unconfirmed analytical test  result that may be suitable for clinical management of patients  in certain situations. Drug-test results should be interpreted  in the context of clinical information. Patient metabolic  variables, specific drug chemistry, and specimen  characteristics can affect test outcome. Technical consultation  is available if a test result is inconsistent with an expected  outcome. (email-[email or web address] or call toll-free  [phone])     • 25 Hydroxy, Vitamin D 01/24/2023 25.2 (L)  30.0 - 100.0 ng/mL Final    Comment: Vitamin D deficiency has been defined by the [place] of  Medicine and an Endocrine Society practice guideline as a  level of serum 25-OH  vitamin D less than 20 ng/mL (1,2).  The Endocrine Society went on to further define vitamin D  insufficiency as a level between 21 and 29 ng/mL (2).  1. IOM ([place] of Medicine). 2010. Dietary reference     intakes for calcium and D. [place]: The     National Academies Press.  2. Caroline MF, Kennedi MCGUIRE, Lisa ALBERTS, et al.     Evaluation, treatment, and prevention of vitamin D     deficiency: an Endocrine Society clinical practice     guideline. JCEM. 2011 Jul; 96(7):1911-30.           Assessment & Plan   Problems Addressed this Visit    None  Visit Diagnoses     Bipolar I disorder, most recent episode depressed (HCC)  (Chronic)   -  Primary    Relevant Medications    Cariprazine HCl (Vraylar) 3 MG capsule capsule    busPIRone (BUSPAR) 15 MG tablet    buPROPion XL (Wellbutrin XL) 300 MG 24 hr tablet    Generalized anxiety disorder  (Chronic)       Relevant Medications    Cariprazine HCl (Vraylar) 3 MG capsule capsule    busPIRone (BUSPAR) 15 MG tablet    buPROPion XL (Wellbutrin XL) 300 MG 24 hr tablet    Amphetamine-type substance use disorder, moderate, in sustained remission (HCC)  (Chronic)         Diagnoses       Codes Comments    Bipolar I disorder, most recent episode depressed (HCC)    -  Primary ICD-10-CM: F31.30  ICD-9-CM: 296.50     Generalized anxiety disorder     ICD-10-CM: F41.1  ICD-9-CM: 300.02     Amphetamine-type substance use disorder, moderate, in sustained remission (HCC)     ICD-10-CM: F15.21  ICD-9-CM: 304.43           Visit Diagnoses:    ICD-10-CM ICD-9-CM   1. Bipolar I disorder, most recent episode depressed (HCC)  F31.30 296.50   2. Generalized anxiety disorder  F41.1 300.02   3. Amphetamine-type substance use disorder, moderate, in sustained remission (HCC)  F15.21 304.43          GOALS:  Short Term Goals: Patient will be compliant with medication, and patient will have no significant medication related side effects.  Patient will be engaged in psychotherapy as indicated.   Patient will report subjective improvement of symptoms.  Long term goals: To stabilize mood and treat/improve subjective symptoms, the patient will stay out of the hospital, the patient will be at an optimal level of functioning, and the patient will take all medications as prescribed.  The patient verbalized understanding and agreement with goals that were mutually set.      TREATMENT PLAN:   -Continue Buspar 15 mg PO BID for anxiety.   -Continue Vraylar 3 mg PO Daily for bipolar disorder  -Pt is prescribed Suboxone from another provider  -Continue Wellbutrin  mg PO QAM for bipolar depression    *Discussed the importance of staying on Vraylar while taking Wellbutrin XL. Pt to call if she experiences any symptoms of leidy/hypomania. Pt has previously done well on this combination of medication. Pt verbalized understanding*    Medication and treatment options, both pharmacological and non-pharmacological treatment options, discussed during today's visit, including any off label use of medication. Patient acknowledged and verbally consented with current treatment plan and was educated on the importance of compliance with treatment and follow-up appointments.        MEDICATION ISSUES:    Discussed treatment plan and medication options of prescribed medication as well as the risks, benefits, any black box warnings, and side effects including potential falls, possible impaired driving, and metabolic adversities among others, including any off label use of medication. Patient is agreeable to call the office with any worsening of symptoms or onset of side effects, or if any concerns or questions arise.  The contact information for the office is made available to the patient. Patient is agreeable to call 911 or go to the nearest ER should they begin having any SI/HI, or if any urgent concerns arise.       SUICIDE RISK ASSESSMENT: Unalterable demographics and a history of mental health intervention indicate this  patient is in a high risk category compared to the general population. At present, the patient denies active SI/HI, intentions, or plans at this time and agrees to seek immediate care should such thoughts develop. The patient verbalizes understanding of how to access emergency care if needed and agrees to do so. Consideration of suicide risk and protective factors such as history, current presentation, individual strengths and weaknesses, psychosocial and environmental stressors and variables, psychiatric illness and symptoms, medical conditions and pain, took place in this interview. Based on those considerations, the patient is determined: within individual baseline and presenting no imminent risk for suicide or homicide. Other recommendations: The patient does not meet the criteria for inpatient admission and is not a safety risk to self or others at today's visit. Inpatient treatment offers no significant advantages over outpatient treatment for this patient at today's visit.      SAFETY PLAN:  Patient was given ample time for questions and fully participated in treatment planning.  Patient was encouraged to call the clinic with any questions or concerns.  Patient was informed of access to emergency care. If patient were to develop any significant symptomatology, suicidal ideation, homicidal ideation, any concerns, or feel unsafe at any time they are to call the clinic and if unable to get immediate assistance should immediately call 911 or go to the nearest emergency room.  The patient is advised to remove or secure (lock away) all lethal weapons (including guns) and sharps (including razors, scissors, knives, etc.).  All medications (including any prescribed and any over the counter medications) should be stored in a safe and secured location that is not obtainable by children/adolescents.  Patient was given an opportunity and encouraged to ask questions about their medication, illness, and treatment. Patient  contracted verbally for the following: If you are experiencing an emotional crisis or have thoughts of harming yourself or others, please go to your nearest local emergency room or call 911. Will continue to re-assess medication response and side effects frequently to establish efficacy and ensure safety. Risks, any black box warnings, side effects, off label usage, and benefits of medication and treatment discussed with patient, along with potential adverse side effects of current and/or newly prescribed medication, alternative treatment options, and OTC medications.  Patient verbalized understanding of potential risks, any off label use of medication, any black box warnings, and any side effects in their own words. The patient verbalized understanding and agreed to comply with the safety plan discussed in their own words.  Patient given the number to the office. Number also available to the 24- hour suicide hotline.      MEDS ORDERED DURING VISIT:  New Medications Ordered This Visit   Medications   • Cariprazine HCl (Vraylar) 3 MG capsule capsule     Sig: Take 1 capsule by mouth Daily.     Dispense:  30 capsule     Refill:  2   • busPIRone (BUSPAR) 15 MG tablet     Sig: Take 1 tablet by mouth 2 (Two) Times a Day.     Dispense:  60 tablet     Refill:  2   • buPROPion XL (Wellbutrin XL) 300 MG 24 hr tablet     Sig: Take 1 tablet by mouth Every Morning.     Dispense:  30 tablet     Refill:  2       Return in about 10 weeks (around 5/29/2023), or if symptoms worsen or fail to improve, for Recheck.     Treatment plan completed: 1/26/23    Progress toward goal: Not at goal    Functional Status: Mild impairment     Prognosis: Good with Ongoing Treatment         This document has been electronically signed by JENNIFER Reed  March 20, 2023 16:35 EDT     Some of the data in this electronic note has been brought forward from a previous encounter, any necessary changes have been made, it has been reviewed by this APRN,  and it is accurate.    Please note that portions of this note were completed with a voice recognition program. Efforts were made to edit dictation, but occasionally words are mistranscribed.

## 2023-04-04 DIAGNOSIS — E66.01 MORBID OBESITY WITH BMI OF 50.0-59.9, ADULT: ICD-10-CM

## 2023-04-04 NOTE — TELEPHONE ENCOUNTER
Rx Refill Note  Requested Prescriptions     Pending Prescriptions Disp Refills   • topiramate (Topamax) 25 MG tablet 60 tablet 0     Sig: Take one daily at bedtime for a week then increase to 2 daily at bedtime   • phentermine 15 MG capsule 30 capsule 0     Sig: Take 1 capsule by mouth Every Morning for 30 days.      Last office visit with prescribing clinician: Visit date not found   Last telemedicine visit with prescribing clinician: 4/17/2023   Next office visit with prescribing clinician: Visit date not found                         Would you like a call back once the refill request has been completed: [] Yes [] No    If the office needs to give you a call back, can they leave a voicemail: [] Yes [] No    Alena Thibodeaux MA  04/04/23, 14:04 EDT

## 2023-04-10 RX ORDER — TOPIRAMATE 25 MG/1
TABLET ORAL
Qty: 60 TABLET | Refills: 0 | OUTPATIENT
Start: 2023-04-10 | End: 2023-05-02

## 2023-04-10 RX ORDER — PHENTERMINE HYDROCHLORIDE 15 MG/1
15 CAPSULE ORAL EVERY MORNING
Qty: 30 CAPSULE | Refills: 0 | OUTPATIENT
Start: 2023-04-10 | End: 2023-05-10

## 2023-04-17 ENCOUNTER — TELEMEDICINE (OUTPATIENT)
Dept: BARIATRICS/WEIGHT MGMT | Facility: CLINIC | Age: 33
End: 2023-04-17
Payer: COMMERCIAL

## 2023-04-17 VITALS
WEIGHT: 275 LBS | DIASTOLIC BLOOD PRESSURE: 82 MMHG | SYSTOLIC BLOOD PRESSURE: 125 MMHG | BODY MASS INDEX: 53.99 KG/M2 | HEIGHT: 60 IN

## 2023-04-17 DIAGNOSIS — E66.01 MORBID OBESITY WITH BMI OF 50.0-59.9, ADULT: ICD-10-CM

## 2023-04-17 PROCEDURE — 99213 OFFICE O/P EST LOW 20 MIN: CPT | Performed by: NURSE PRACTITIONER

## 2023-04-17 RX ORDER — PHENTERMINE HYDROCHLORIDE 15 MG/1
15 CAPSULE ORAL EVERY MORNING
Qty: 30 CAPSULE | Refills: 0 | Status: SHIPPED | OUTPATIENT
Start: 2023-04-17 | End: 2023-05-17

## 2023-04-17 RX ORDER — TOPIRAMATE 25 MG/1
TABLET ORAL
Qty: 60 TABLET | Refills: 2 | Status: SHIPPED | OUTPATIENT
Start: 2023-04-17 | End: 2023-05-15

## 2023-04-17 NOTE — PROGRESS NOTES
Office Note      Date: 2023  Patient Name: Franca Ruiz  MRN: [id number]  : 1990    Patient presents during the COVID-19 pandemic/federally declared state of public health emergency.  This service was conducted via Zoom.  Patient is located in the [facility]. Provider is located at her primary office location. The use of a video visit has been reviewed with the patient and verbal informed consent has been obtained.  Subjective  Subjective     Chief Complaint  Obesity Management follow-up    Subjective          Franca Ruiz presents to [facility] via telehealth for obesity management.       Patient is satisfied with weight loss progress. Appetite is well controlled. Reports no side effects of prescribed medications today. She is journalling most days and staying within calorie goals most days. She was seen virtually today so unable to review journal but reports only missing a few days. She did start phentermine 15 mg and topiramate without adverse effects.     The patient is exercising with a FITT score of:  Review of Systems   Constitutional: Negative for appetite change and fatigue.   Eyes: Negative for blurred vision, double vision and visual disturbance.   Cardiovascular: Negative for chest pain and palpitations.   Gastrointestinal: Negative for abdominal pain, constipation, diarrhea, nausea, vomiting and GERD.   Endocrine: Negative for polydipsia, polyphagia and polyuria.   Musculoskeletal: Negative for arthralgias, back pain and myalgias.   Neurological: Negative for dizziness, tremors, light-headedness, headache and memory problem.        Parasthesias negative   Psychiatric/Behavioral: Negative for sleep disturbance, depressed mood and stress. The patient is not nervous/anxious.          Objective   Body mass index is 53.71 kg/m².  Start weight: 302.8 pounds.    Total Loss lb/%Loss of beginning body weight (BBW): 27lb/9.8%  Change in weight  "since last visit: -13    Wt Readings from Last 6 Encounters:   04/17/23 1511 125 kg (275 lb)   03/09/23 1529 131 kg (288 lb 9.6 oz)   02/07/23 1514 136 kg (300 lb)   01/24/23 1432 (!) 137 kg (302 lb 12.8 oz)   12/12/22 1415 136 kg (299 lb)   11/29/22 0818 135 kg (297 lb)         General:  .well developed; well nourished  no acute distress  obese        /82   Ht 152.4 cm (60\")   Wt 125 kg (275 lb)   BMI 53.71 kg/m²       Result Review :                 Assessment and Plan    Diagnoses and all orders for this visit:    1. Morbid obesity with BMI of 50.0-59.9, adult  Assessment & Plan:  Patient's (Body mass index is 53.71 kg/m².) indicates that they are obese (BMI >30) with health conditions that include obstructive sleep apnea, dyslipidemias, osteoarthritis and insulin resistance . Weight is improving with treatment. BMI  is above average; BMI management plan is completed. We discussed low calorie, low carb based diet program, portion control, increasing exercise, pharmacologic options including topomax and phentermine and an diana-based approach such as [employer] Pal or Lose It.   --This is a follow up visit and she is down around 13 lbs  --Last visit she set a goal of journalling. She has been consistent  --At last visit we had set a goal of walking. Continuing goal to    Orders:  -     phentermine 15 MG capsule; Take 1 capsule by mouth Every Morning for 30 days.  Dispense: 30 capsule; Refill: 0  -     topiramate (Topamax) 25 MG tablet; Take one daily at bedtime for a week then increase to 2 daily at bedtime  Dispense: 60 tablet; Refill: 2    We discussed the risks, benefits, and limitations of treatments. Continue medications and OTC supplements as discussed. Patient verbalizes understanding of and agreement with management plan.     Follow Up   Return in about 4 weeks (around 5/15/2023).  I spent 40 minutes on this date of service. This time includes time spent by me in the following activities:preparing " for the visit, counseling and educating the patient/family/caregiver, ordering medications, tests, or procedures and documenting information in the medical record.  Patient was given instructions and counseling regarding her condition or for health maintenance advice. Please see specific information pulled into the AVS if appropriate.     Gracy Singh, APRN

## 2023-04-17 NOTE — ASSESSMENT & PLAN NOTE
Patient's (Body mass index is 53.71 kg/m².) indicates that they are obese (BMI >30) with health conditions that include obstructive sleep apnea, dyslipidemias, osteoarthritis and insulin resistance . Weight is improving with treatment. BMI  is above average; BMI management plan is completed. We discussed low calorie, low carb based diet program, portion control, increasing exercise, pharmacologic options including topomax and phentermine and an diana-based approach such as [employer] Pal or Lose It.   --This is a follow up visit and she is down around 13 lbs  --Last visit she set a goal of journalling. She has been consistent  --At last visit we had set a goal of walking. Continuing goal to

## 2023-05-22 ENCOUNTER — OFFICE VISIT (OUTPATIENT)
Dept: FAMILY MEDICINE CLINIC | Facility: CLINIC | Age: 33
End: 2023-05-22
Payer: COMMERCIAL

## 2023-05-22 VITALS
WEIGHT: 293 LBS | HEART RATE: 83 BPM | SYSTOLIC BLOOD PRESSURE: 130 MMHG | DIASTOLIC BLOOD PRESSURE: 90 MMHG | HEIGHT: 60 IN | BODY MASS INDEX: 57.52 KG/M2 | OXYGEN SATURATION: 99 % | TEMPERATURE: 99.1 F

## 2023-05-22 DIAGNOSIS — Z20.822 CLOSE EXPOSURE TO COVID-19 VIRUS: ICD-10-CM

## 2023-05-22 DIAGNOSIS — J06.9 UPPER RESPIRATORY TRACT INFECTION, UNSPECIFIED TYPE: Primary | ICD-10-CM

## 2023-05-22 LAB
EXPIRATION DATE: NORMAL
FLUAV AG UPPER RESP QL IA.RAPID: NOT DETECTED
FLUBV AG UPPER RESP QL IA.RAPID: NOT DETECTED
INTERNAL CONTROL: NORMAL
Lab: NORMAL
SARS-COV-2 AG UPPER RESP QL IA.RAPID: NOT DETECTED

## 2023-05-22 PROCEDURE — 87428 SARSCOV & INF VIR A&B AG IA: CPT | Performed by: STUDENT IN AN ORGANIZED HEALTH CARE EDUCATION/TRAINING PROGRAM

## 2023-05-22 PROCEDURE — 99213 OFFICE O/P EST LOW 20 MIN: CPT | Performed by: STUDENT IN AN ORGANIZED HEALTH CARE EDUCATION/TRAINING PROGRAM

## 2023-05-22 RX ORDER — BENZONATATE 100 MG/1
100 CAPSULE ORAL 3 TIMES DAILY PRN
Qty: 30 CAPSULE | Refills: 0 | Status: SHIPPED | OUTPATIENT
Start: 2023-05-22

## 2023-05-22 RX ORDER — GUAIFENESIN, PSEUDOEPHEDRINE HYDROCHLORIDE 600; 60 MG/1; MG/1
1 TABLET, EXTENDED RELEASE ORAL EVERY 12 HOURS
Qty: 30 TABLET | Refills: 0 | Status: SHIPPED | OUTPATIENT
Start: 2023-05-22

## 2023-05-22 RX ORDER — PREDNISONE 20 MG/1
TABLET ORAL
COMMUNITY
Start: 2023-05-16

## 2023-05-22 NOTE — PROGRESS NOTES
Established Office Visit      Patient Name: Franca Ruiz  : 1990   MRN: [id number]   Care Team: Patient Care Team:  Amisha Potts DO as PCP - General (Internal Medicine)    Chief Complaint:    Chief Complaint   Patient presents with   • Sore Throat     Mother tested positive for covid    • Cough       History of Present Illness: Franca Ruiz is a 33 y.o. female who is here today to discuss URI symptoms and recent covid exposure.    Symptoms started yesterday. She reports scratchy throat, dry cough, headache. No nausea, vomiting, diarrhea, fever, SOA, wheezing. She has seasonal allergies and is taking claritin for these.   She was recently around her mother who tested positive for covid 2 days ago.       Subjective      Review of Systems:   Review of Systems - See HPI    I have reviewed and the following portions of the patient's history were updated as appropriate: past family history, past medical history, past social history, past surgical history and problem list.    Medications:     Current Outpatient Medications:   •  buprenorphine-naloxone (SUBOXONE) 8-2 MG per SL tablet, 1 tablet 2 (Two) Times a Day., Disp: , Rfl:   •  buPROPion XL (Wellbutrin XL) 300 MG 24 hr tablet, Take 1 tablet by mouth Every Morning., Disp: 30 tablet, Rfl: 2  •  busPIRone (BUSPAR) 15 MG tablet, Take 1 tablet by mouth 2 (Two) Times a Day., Disp: 60 tablet, Rfl: 2  •  Cariprazine HCl (Vraylar) 3 MG capsule capsule, Take 1 capsule by mouth Daily., Disp: 30 capsule, Rfl: 2  •  predniSONE (DELTASONE) 20 MG tablet, , Disp: , Rfl:   •  psyllium (METAMUCIL) 0.52 g capsule, Take 2 capsules by mouth 2 (Two) Times a Day. Titrate up as directed. Take with a minimum of 8oz water., Disp: 120 capsule, Rfl: 11  •  benzonatate (Tessalon Perles) 100 MG capsule, Take 1 capsule by mouth 3 (Three) Times a Day As Needed for Cough., Disp: 30 capsule, Rfl: 0  •  phentermine 15 MG capsule, Take 1 capsule by mouth Every Morning for 30 days.,  "Disp: 30 capsule, Rfl: 0  •  pseudoephedrine-guaifenesin (MUCINEX D)  MG per 12 hr tablet, Take 1 tablet by mouth Every 12 (Twelve) Hours., Disp: 30 tablet, Rfl: 0  •  topiramate (Topamax) 25 MG tablet, Take one daily at bedtime for a week then increase to 2 daily at bedtime, Disp: 60 tablet, Rfl: 2    Current Facility-Administered Medications:   •  cyanocobalamin injection 1,000 mcg, 1,000 mcg, Intramuscular, Q28 Days, DeliamaidaGracy medinaJENNIFER, 1,000 mcg at 02/07/23 1607    Allergies:   Allergies   Allergen Reactions   • Augmentin [Amoxicillin-Pot Clavulanate] Other (See Comments)     Throat blisters   • Crow Flavor [Flavoring Agent] Unknown - Low Severity   • Vantin [Cefpodoxime] Other (See Comments)     Throat blisters       Objective     Physical Exam:  Vital Signs:   Vitals:    05/22/23 1505   BP: 130/90   Pulse: 83   Temp: 99.1 °F (37.3 °C)   SpO2: 99%   Weight: 133 kg (294 lb)   Height: 152.4 cm (60\")     Body mass index is 57.42 kg/m².     Physical Exam  Vitals reviewed.   Constitutional:       Appearance: Normal appearance. She is obese. She is not ill-appearing.   HENT:      Right Ear: Tympanic membrane, ear canal and external ear normal.      Left Ear: Tympanic membrane, ear canal and external ear normal.      Nose: Congestion present.   Cardiovascular:      Rate and Rhythm: Normal rate and regular rhythm.      Heart sounds: Normal heart sounds. No murmur heard.  Pulmonary:      Effort: Pulmonary effort is normal. No respiratory distress.      Breath sounds: Normal breath sounds. No wheezing, rhonchi or rales.   Skin:     General: Skin is warm and dry.   Neurological:      Mental Status: She is alert.   Psychiatric:         Mood and Affect: Mood normal.         Behavior: Behavior normal.         Judgment: Judgment normal.         Assessment / Plan      Assessment/Plan:   Problems Addressed This Visit  Diagnoses and all orders for this visit:    1. Upper respiratory tract infection, unspecified " type (Primary)  -     POCT SARS-CoV-2 Antigen GABRIELLE + Flu  -     benzonatate (Tessalon Perles) 100 MG capsule; Take 1 capsule by mouth 3 (Three) Times a Day As Needed for Cough.  Dispense: 30 capsule; Refill: 0  -     pseudoephedrine-guaifenesin (MUCINEX D)  MG per 12 hr tablet; Take 1 tablet by mouth Every 12 (Twelve) Hours.  Dispense: 30 tablet; Refill: 0    2. Close exposure to COVID-19 virus  -     POCT SARS-CoV-2 Antigen GABRIELLE + Flu  -     benzonatate (Tessalon Perles) 100 MG capsule; Take 1 capsule by mouth 3 (Three) Times a Day As Needed for Cough.  Dispense: 30 capsule; Refill: 0  -     pseudoephedrine-guaifenesin (MUCINEX D)  MG per 12 hr tablet; Take 1 tablet by mouth Every 12 (Twelve) Hours.  Dispense: 30 tablet; Refill: 0      She presents with mild cold symptoms today and low grade fever with recent covid exposure. Her test is negative today although it is possible this is false negative due to early testing. I recommend she quarantine and avoid work over the next 2 days, rest, hydration, and conservative therapy with claritin, mucinex-D, tessalon perles, and tylenol/NSAID prn aches/fever. She expressed understanding and agreeable with plan. Will let me know if symptoms worsen or do not improve. Will plan to retest using home covid test prior to returning to work to confirm negative. If positive, this would alter her quarantine recommendations and she will let me know.     Plan of care reviewed with patient at the conclusion of today's visit. Education was provided regarding diagnosis and management.  Patient verbalizes understanding of and agreement with management plan.    Follow Up:   No follow-ups on file.        DO FARIBA Bhagat RD  [facility]  [address]  [facility] [phone]  Fax [phone]  Phone [phone]

## 2023-05-22 NOTE — LETTER
May 22, 2023     Patient: Franca Ruiz   YOB: 1990   Date of Visit: 5/22/2023       To Whom It May Concern:    It is my medical opinion that Franca Ruiz should remain out of work until 5/24/23 .           Sincerely,        Amisha Potts,     CC:   No Recipients

## 2023-05-30 ENCOUNTER — TELEMEDICINE (OUTPATIENT)
Dept: PSYCHIATRY | Facility: CLINIC | Age: 33
End: 2023-05-30

## 2023-05-30 DIAGNOSIS — F41.1 GENERALIZED ANXIETY DISORDER: Chronic | ICD-10-CM

## 2023-05-30 DIAGNOSIS — F31.30 BIPOLAR I DISORDER, MOST RECENT EPISODE DEPRESSED: Primary | Chronic | ICD-10-CM

## 2023-05-30 DIAGNOSIS — F15.21: ICD-10-CM

## 2023-05-30 PROCEDURE — 1159F MED LIST DOCD IN RCRD: CPT | Performed by: NURSE PRACTITIONER

## 2023-05-30 PROCEDURE — 99214 OFFICE O/P EST MOD 30 MIN: CPT | Performed by: NURSE PRACTITIONER

## 2023-05-30 PROCEDURE — 1160F RVW MEDS BY RX/DR IN RCRD: CPT | Performed by: NURSE PRACTITIONER

## 2023-05-30 RX ORDER — BUPROPION HYDROCHLORIDE 150 MG/1
TABLET ORAL
Qty: 30 TABLET | Refills: 1 | Status: SHIPPED | OUTPATIENT
Start: 2023-05-30

## 2023-05-30 NOTE — PROGRESS NOTES
"This provider is completing this appointment through [facility] (through [facility]), [address], [place], [address] using a secure [employer] Video Visit through [employer]. Patient is being seen remotely via telehealth in [place], and stated they are in a secure environment for this session. The patient's condition being diagnosed/treated is appropriate for telemedicine. The provider identified herself as well as her credentials.   The patient, and/or patients guardian, consent to be seen remotely, and when consent is given they understand that the consent allows for patient identifiable information to be sent to a third party as needed.   They may refuse to be seen remotely at any time. The electronic data is encrypted and password protected, and the patient and/or guardian has been advised of the potential risks to privacy not withstanding such measures.    You have chosen to receive care through a telehealth visit.  Do you consent to use a video/audio connection for your medical care today? Yes    Patient identifiers utilized: Name and date of birth.        Subjective   Franca Ruiz is a 33 y.o. female who presents today for follow up    Chief Complaint:  Bipolar disorder, anxiety    Accompanied by: Pt was alone for duration of appointment    History of Present Illness:   Pt was last seen by this APRN on 3/20/23.  Pt states she has been doing \"alright\". Sometime after her last appointment, she began having vision issues in her right eye and in her peripheral vision. Pt saw a provider and was diagnosed with MEWDS. She has been on steroids for the past x5-6 weeks and reports she will have to be on them for at least another x4-5 weeks. Pt states they make her \"grumpy\" and it has also increased her appetite. Per pt, any weight she lost from the phentermine, she has most likely gained back. Pt hasn't been taking it while on the steroids. Pt is currently working two jobs. 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 Pt made all A's in her courses for the semester. She is taking the summer off and will return in the fall. Pt is having some difficulty finding things to keep her occupied in her spare time. Pt feels she is watching too much television. Pt has always worked and has never been on a vacation. Pt will occasionally go out with a friend. Pt continues to maintain sobriety. Sleep is stable on her CPAP. Pt states she just feels as if she has been \"existing\" for months, at least prior to the start of the school semester. Pt is lacking motivation and energy. Pt admits she told this APRN last appointment she was doing well because she thought the depression would go away, but it has persisted. Discussed medication options with pt and she is open to increasing the Wellbutrin XL to 450 mg PO QAM. Pt has agreed to call this APRN if she experiences any hypomania/leidy symptoms. The patient reports compliance with current medication regimen. The patient denies any current side effects from their current medication regimen. The patient denies any abnormal muscle movements or tics. The patient rates their depression on average in the past week at a 7/10 on a 0-10 scale, with 10 being the worst. The patient rates their anxiety on average the past week at a 4/10 on a 0-10 scale, with 10 being the worst. The patient would like to increase their medications at this visit. The patient denies any suicidal or homicidal ideations, plans, or intent at today's encounter and is convincing. The patient denies any auditory hallucinations or visual hallucinations. The patient does not endorse any significant symptoms consistent with leidy or psychosis at today's encounter.     *If the patient has any concerns or needs assistance, they may call the [facility] at [phone]*          Prior Psychiatric " Medications:  Wellbutrin XL: is helpful  Naltrexone: is helpful   Hydroxyzine: helps, but is sedating  Trazodone: takes PRN and is helpful  Buspar: unsure if effective  Depakote ER: may be beneficial  Paxil:   Zoloft  Celexa  Prozac: may have been helpful  Cymbalta: may have been helpful  Seroquel: side effects; too sedating  Abilify: sedating  Vraylar: helpful        The following portions of the patient's history were reviewed and updated as appropriate: allergies, current medications, past family history, past medical history, past social history, past surgical history and problem list.          Past Medical History:  Past Medical History:   Diagnosis Date   • Allergic     Seasonal, controlled with 2nd gen antihistamine   • Anxiety    • Arthritis    • Bilateral ovarian cysts    • Bipolar disorder     managed by behavioral [facility]   • Chronic pain    • Depression    • Obesity    • Seizures     denied by patient   • Sleep apnea     CPAP, managed by [facility]   • Substance abuse     [facility] by [facility]       Social History:  Social History     Socioeconomic History   • Marital status: Single   Tobacco Use   • Smoking status: Former     Types: Electronic Cigarette     Quit date: 2018     Years since quittin.4   • Smokeless tobacco: Never   Vaping Use   • Vaping Use: Every day   • Substances: Nicotine, Flavoring   Substance and Sexual Activity   • Alcohol use: Never   • Drug use: Not Currently     Types: Amphetamines, Benzodiazepines, Fentanyl, Hydrocodone, Marijuana, MDMA (ecstacy), Methamphetamines, Morphine, Oxycodone, PCP     Comment: 2021 (Last use)   • Sexual activity: Not Currently     Partners: Male     Birth control/protection: Condom, Abstinence       Family History:  Family History   Problem Relation Age of Onset   • Mental illness Mother    • Arthritis Father    • Hypertension Father    • Mental illness Father    • Obesity Father    • Bipolar disorder Father    • Alcohol abuse  Father    • Sleep apnea Father    • Kidney disease Paternal Aunt    • Mental illness Paternal Aunt    • Bipolar disorder Paternal Aunt    • Cancer Paternal Aunt         unspecified   • Mental illness Paternal Uncle    • Cancer Paternal Uncle         unspecified   • Cancer Paternal Grandmother         unspecified   • Sleep apnea Other    • Asthma Other    • COPD Other    • Restless legs syndrome Other        Past Surgical History:  Past Surgical History:   Procedure Laterality Date   • ANKLE SURGERY Right 2013   • CARPAL TUNNEL RELEASE Left 04/2022   • CARPAL TUNNEL RELEASE Right 07/2022   • WISDOM TOOTH EXTRACTION         Problem List:  Patient Active Problem List   Diagnosis   • Moderate episode of recurrent major depressive disorder   • Sleep apnea   • Bilateral carpal tunnel syndrome   • Morbid obesity with BMI of 50.0-59.9, adult   • History of substance abuse   • Bipolar disorder   • Substance abuse   • Sleep apnea   • Insulin resistance   • Pure hypercholesterolemia       Allergy:   Allergies   Allergen Reactions   • Augmentin [Amoxicillin-Pot Clavulanate] Other (See Comments)     Throat blisters   • Crow Flavor [Flavoring Agent] Unknown - Low Severity   • Vantin [Cefpodoxime] Other (See Comments)     Throat blisters        Current Medications:   Current Outpatient Medications   Medication Sig Dispense Refill   • benzonatate (Tessalon Perles) 100 MG capsule Take 1 capsule by mouth 3 (Three) Times a Day As Needed for Cough. 30 capsule 0   • buprenorphine-naloxone (SUBOXONE) 8-2 MG per SL tablet 1 tablet 2 (Two) Times a Day.     • buPROPion XL (Wellbutrin XL) 150 MG 24 hr tablet Take 1 tablet PO QAM (in addition to 300 mg to = a total of 450 mg/daily) 30 tablet 1   • buPROPion XL (Wellbutrin XL) 300 MG 24 hr tablet Take 1 tablet by mouth Every Morning. 30 tablet 2   • busPIRone (BUSPAR) 15 MG tablet Take 1 tablet by mouth 2 (Two) Times a Day. 60 tablet 2   • Cariprazine HCl (Vraylar) 3 MG capsule capsule Take 1  capsule by mouth Daily. 30 capsule 2   • phentermine 15 MG capsule Take 1 capsule by mouth Every Morning for 30 days. 30 capsule 0   • predniSONE (DELTASONE) 20 MG tablet      • pseudoephedrine-guaifenesin (MUCINEX D)  MG per 12 hr tablet Take 1 tablet by mouth Every 12 (Twelve) Hours. 30 tablet 0   • psyllium (METAMUCIL) 0.52 g capsule Take 2 capsules by mouth 2 (Two) Times a Day. Titrate up as directed. Take with a minimum of 8oz water. 120 capsule 11   • topiramate (Topamax) 25 MG tablet Take one daily at bedtime for a week then increase to 2 daily at bedtime 60 tablet 2     Current Facility-Administered Medications   Medication Dose Route Frequency Provider Last Rate Last Admin   • cyanocobalamin injection 1,000 mcg  1,000 mcg Intramuscular Q28 Days Santhosh SinghJENNIFER álvarez   1,000 mcg at 02/07/23 1607       Review of Symptoms:    Review of Systems   Constitutional: Positive for activity change, appetite change, fatigue and unexpected weight gain.   Eyes: Positive for visual disturbance.   Psychiatric/Behavioral: Negative.  Positive for depressed mood.         Physical Exam:   Due to the remote nature of this encounter (virtual encounter), vitals were unable to be obtained.  Height stated at 61 inches.  Weight stated at 294 pounds.        Physical Exam  Neurological:      Mental Status: She is alert.   Psychiatric:         Attention and Perception: Attention and perception normal. She does not perceive auditory or visual hallucinations.         Mood and Affect: Affect normal. Mood is depressed.         Speech: Speech normal.         Behavior: Behavior normal. Behavior is cooperative.         Thought Content: Thought content normal. Thought content is not paranoid or delusional. Thought content does not include homicidal or suicidal ideation. Thought content does not include homicidal or suicidal plan.         Cognition and Memory: Cognition and memory normal.         Judgment: Judgment normal.            Mental Status Exam:   Hygiene:   good  Cooperation:  Cooperative  Eye Contact:  Fair  Psychomotor Behavior:  Appropriate  Affect:  Appropriate  Mood: depressed  Speech:  Normal  Thought Process:  Linear  Thought Content:  Mood congruent  Suicidal:  None  Homicidal:  None  Hallucinations:  None  Delusion:  None  Memory:  Intact  Orientation:  Person, Place, Time and Situation  Reliability:  good  Insight:  Good  Judgement:  Good  Impulse Control:  Good        Previous Provider notes and available records reviewed by this APRN at today's encounter.         Lab Results:   Office Visit on 05/22/2023   Component Date Value Ref Range Status   • SARS Antigen 05/22/2023 Not Detected  Not Detected, Presumptive Negative Final   • Influenza A Antigen GABRIELLE 05/22/2023 Not Detected  Not Detected Final   • Influenza B Antigen GABRIELLE 05/22/2023 Not Detected  Not Detected Final   • Internal Control 05/22/2023 Passed  Passed Final   • Lot Number 05/22/2023 [id number]   Final   • Expiration Date 05/22/2023 3-23-24   Final         Assessment & Plan   Problems Addressed this Visit    None  Visit Diagnoses     Bipolar I disorder, most recent episode depressed  (Chronic)   -  Primary    Relevant Medications    buPROPion XL (Wellbutrin XL) 150 MG 24 hr tablet    Generalized anxiety disorder  (Chronic)       Relevant Medications    buPROPion XL (Wellbutrin XL) 150 MG 24 hr tablet    Amphetamine-type substance use disorder, moderate, in sustained remission          Diagnoses       Codes Comments    Bipolar I disorder, most recent episode depressed    -  Primary ICD-10-CM: F31.30  ICD-9-CM: 296.50     Generalized anxiety disorder     ICD-10-CM: F41.1  ICD-9-CM: 300.02     Amphetamine-type substance use disorder, moderate, in sustained remission     ICD-10-CM: F15.21  ICD-9-CM: 304.43           Visit Diagnoses:    ICD-10-CM ICD-9-CM   1. Bipolar I disorder, most recent episode depressed  F31.30 296.50   2. Generalized anxiety disorder   F41.1 300.02   3. Amphetamine-type substance use disorder, moderate, in sustained remission  F15.21 304.43          GOALS:  Short Term Goals: Patient will be compliant with medication, and patient will have no significant medication related side effects.  Patient will be engaged in psychotherapy as indicated.  Patient will report subjective improvement of symptoms.  Long term goals: To stabilize mood and treat/improve subjective symptoms, the patient will stay out of the hospital, the patient will be at an optimal level of functioning, and the patient will take all medications as prescribed.  The patient verbalized understanding and agreement with goals that were mutually set.      TREATMENT PLAN:   -Continue Buspar 15 mg PO BID for anxiety.   -Continue Vraylar 3 mg PO Daily for bipolar disorder  -Pt is prescribed Suboxone from another provider  -Increase Wellbutrin XL to 450 mg PO QAM for bipolar depression    *Pt has agreed to call this APRN if she experiences any symptoms of leidy/hypomania. If pt doesn't get any added benefit from the increase in Wellbutrin XL, will decrease dose back to 300 mg and may try adding small dose of Prozac to medication regimen*    Medication and treatment options, both pharmacological and non-pharmacological treatment options, discussed during today's visit, including any off label use of medication. Patient acknowledged and verbally consented with current treatment plan and was educated on the importance of compliance with treatment and follow-up appointments.      Discussed medication options and treatment plan of prescribed medication, any off label use of medication, as well as the risks, benefits, any black box warnings including increased suicidality, and side effects including but not limited to potential falls, dizziness, possible impaired driving, GI side effects (change in appetite, abdominal discomfort, nausea, vomiting, diarrhea, and/or constipation), dry mouth,  somnolence, sedation, insomnia, activation, agitation, irritation, tremors, abnormal muscle movements or disorders, tardive dyskinesia, akathisia, asthenia, headache, sweating, possible bruising or rare bleeding, electrolyte and/or fluid abnormalities, change in blood pressure/heart rate/and or heart rhythm, hypotension, sexual dysfunction, rare impulse control problems, rare seizures, rare neuroleptic malignant syndrome, increased risk of death and cerebrovascular events, change in blood glucose and increased risk for diabetes, change in triglycerides and cholesterol and increased risk for dyslipidemia,  weight gain, weight gain that can become problematic to health, skin conditions and reactions, and metabolic adversities among others. Patient and/or guardian are agreeable to call the office with any worsening of symptoms or onset of side effects, or if any concerns or questions arise.  The contact information for the office is made available to the patient and/or guardian. Patient and/or guardian are agreeable to call 911 or go to the nearest ER should they begin having any SI/HI, or if any urgent concerns arise.      MEDICATION ISSUES:    Discussed treatment plan and medication options of prescribed medication as well as the risks, benefits, any black box warnings, and side effects including potential falls, possible impaired driving, and metabolic adversities among others, including any off label use of medication. Patient is agreeable to call the office with any worsening of symptoms or onset of side effects, or if any concerns or questions arise.  The contact information for the office is made available to the patient. Patient is agreeable to call 911 or go to the nearest ER should they begin having any SI/HI, or if any urgent concerns arise.       SUICIDE RISK ASSESSMENT: Unalterable demographics and a history of mental health intervention indicate this patient is in a high risk category compared to the  general population. At present, the patient denies active SI/HI, intentions, or plans at this time and agrees to seek immediate care should such thoughts develop. The patient verbalizes understanding of how to access emergency care if needed and agrees to do so. Consideration of suicide risk and protective factors such as history, current presentation, individual strengths and weaknesses, psychosocial and environmental stressors and variables, psychiatric illness and symptoms, medical conditions and pain, took place in this interview. Based on those considerations, the patient is determined: within individual baseline and presenting no imminent risk for suicide or homicide. Other recommendations: The patient does not meet the criteria for inpatient admission and is not a safety risk to self or others at today's visit. Inpatient treatment offers no significant advantages over outpatient treatment for this patient at today's visit.      SAFETY PLAN:  Patient was given ample time for questions and fully participated in treatment planning.  Patient was encouraged to call the clinic with any questions or concerns.  Patient was informed of access to emergency care. If patient were to develop any significant symptomatology, suicidal ideation, homicidal ideation, any concerns, or feel unsafe at any time they are to call the clinic and if unable to get immediate assistance should immediately call 911 or go to the nearest emergency room.  The patient is advised to remove or secure (lock away) all lethal weapons (including guns) and sharps (including razors, scissors, knives, etc.).  All medications (including any prescribed and any over the counter medications) should be stored in a safe and secured location that is not obtainable by children/adolescents.  Patient was given an opportunity and encouraged to ask questions about their medication, illness, and treatment. Patient contracted verbally for the following: If you are  experiencing an emotional crisis or have thoughts of harming yourself or others, please go to your nearest local emergency room or call 911. Will continue to re-assess medication response and side effects frequently to establish efficacy and ensure safety. Risks, any black box warnings, side effects, off label usage, and benefits of medication and treatment discussed with patient, along with potential adverse side effects of current and/or newly prescribed medication, alternative treatment options, and OTC medications.  Patient verbalized understanding of potential risks, any off label use of medication, any black box warnings, and any side effects in their own words. The patient verbalized understanding and agreed to comply with the safety plan discussed in their own words.  Patient given the number to the office. Number also available to the 24- hour suicide hotline.      MEDS ORDERED DURING VISIT:  New Medications Ordered This Visit   Medications   • buPROPion XL (Wellbutrin XL) 150 MG 24 hr tablet     Sig: Take 1 tablet PO QAM (in addition to 300 mg to = a total of 450 mg/daily)     Dispense:  30 tablet     Refill:  1       Return in about 4 weeks (around 6/27/2023), or if symptoms worsen or fail to improve, for Recheck.     Treatment plan completed: 1/26/23    Progress toward goal: Not at goal    Functional Status: Moderate impairment     Prognosis: Good with Ongoing Treatment         This document has been electronically signed by JENNIFER Reed  May 30, 2023 16:35 EDT     Some of the data in this electronic note has been brought forward from a previous encounter, any necessary changes have been made, it has been reviewed by this APRN, and it is accurate.    Please note that portions of this note were completed with a voice recognition program. Efforts were made to edit dictation, but occasionally words are mistranscribed.

## 2023-08-01 ENCOUNTER — TELEMEDICINE (OUTPATIENT)
Dept: PSYCHIATRY | Facility: CLINIC | Age: 33
End: 2023-08-01
Payer: COMMERCIAL

## 2023-08-01 DIAGNOSIS — F31.30 BIPOLAR I DISORDER, MOST RECENT EPISODE DEPRESSED: Primary | Chronic | ICD-10-CM

## 2023-08-01 DIAGNOSIS — F15.21: ICD-10-CM

## 2023-08-01 DIAGNOSIS — F41.1 GENERALIZED ANXIETY DISORDER: Chronic | ICD-10-CM

## 2023-08-01 PROCEDURE — 99214 OFFICE O/P EST MOD 30 MIN: CPT | Performed by: NURSE PRACTITIONER

## 2023-08-01 RX ORDER — LAMOTRIGINE 25 MG/1
TABLET ORAL
Qty: 46 TABLET | Refills: 0 | Status: SHIPPED | OUTPATIENT
Start: 2023-08-01

## 2023-08-03 DIAGNOSIS — E66.01 MORBID OBESITY WITH BMI OF 50.0-59.9, ADULT: ICD-10-CM

## 2023-08-03 RX ORDER — TOPIRAMATE 25 MG/1
TABLET ORAL
Qty: 60 TABLET | Refills: 2 | Status: CANCELLED | OUTPATIENT
Start: 2023-08-03 | End: 2023-08-31

## 2023-08-24 ENCOUNTER — TELEMEDICINE (OUTPATIENT)
Dept: PSYCHIATRY | Facility: CLINIC | Age: 33
End: 2023-08-24
Payer: COMMERCIAL

## 2023-08-24 DIAGNOSIS — F41.1 GENERALIZED ANXIETY DISORDER: Chronic | ICD-10-CM

## 2023-08-24 DIAGNOSIS — F31.30 BIPOLAR I DISORDER, MOST RECENT EPISODE DEPRESSED: Primary | Chronic | ICD-10-CM

## 2023-08-24 DIAGNOSIS — F15.21: ICD-10-CM

## 2023-08-24 PROCEDURE — 1159F MED LIST DOCD IN RCRD: CPT | Performed by: NURSE PRACTITIONER

## 2023-08-24 PROCEDURE — 1160F RVW MEDS BY RX/DR IN RCRD: CPT | Performed by: NURSE PRACTITIONER

## 2023-08-24 PROCEDURE — 99214 OFFICE O/P EST MOD 30 MIN: CPT | Performed by: NURSE PRACTITIONER

## 2023-08-24 RX ORDER — LAMOTRIGINE 100 MG/1
100 TABLET ORAL DAILY
Qty: 30 TABLET | Refills: 1 | Status: SHIPPED | OUTPATIENT
Start: 2023-08-24

## 2023-08-24 RX ORDER — BUSPIRONE HYDROCHLORIDE 15 MG/1
15 TABLET ORAL 2 TIMES DAILY
Qty: 60 TABLET | Refills: 1 | Status: SHIPPED | OUTPATIENT
Start: 2023-08-24

## 2023-08-24 RX ORDER — BUPROPION HYDROCHLORIDE 150 MG/1
TABLET ORAL
Qty: 30 TABLET | Refills: 1 | Status: SHIPPED | OUTPATIENT
Start: 2023-08-24

## 2023-08-24 RX ORDER — BUPROPION HYDROCHLORIDE 300 MG/1
300 TABLET ORAL EVERY MORNING
Qty: 30 TABLET | Refills: 1 | Status: SHIPPED | OUTPATIENT
Start: 2023-08-24

## 2023-08-24 NOTE — PROGRESS NOTES
"This provider is completing this appointment through [facility] (through [facility]), [address], [place], [address] using a secure [email or web address] Video Visit through [employer]. Patient is being seen remotely via telehealth in [place], and stated they are in a secure environment for this session. The patient's condition being diagnosed/treated is appropriate for telemedicine. The provider identified herself as well as her credentials.   The patient, and/or patients guardian, consent to be seen remotely, and when consent is given they understand that the consent allows for patient identifiable information to be sent to a third party as needed.   They may refuse to be seen remotely at any time. The electronic data is encrypted and password protected, and the patient and/or guardian has been advised of the potential risks to privacy not withstanding such measures.    You have chosen to receive care through a telehealth visit.  Do you consent to use a video/audio connection for your medical care today? Yes    Patient identifiers utilized: Name and date of birth.        Subjective   Franca Ruiz is a 33 y.o. female who presents today for follow up    Chief Complaint:  Bipolar disorder, anxiety    Accompanied by: Pt was alone for duration of appointment    History of Present Illness:   Pt states she is doing \"alight\". Pt is currently on 5 mg of the prednisone and tomorrow should be her last day of it. Her eye has shown improvement with the steroid treatment. Pt has found that she isn't as irritable, but it is not known if it is the Lamictal or the decrease in prednisone. She is getting along with others better. Pt still hasn't talked to her mother; she won't answer pt's calls. Pt denies feeling depressed. Pt is back in school; her first day was Monday. She is understandably feeling a little overwhelmed about it. Sleep has been stable with her CPAP. Appetite has been more elevated " with the prednisone. She plans on going back to the weight loss clinic to restart the phentermine and Topamax sometime soon. Pt has been on the Lamictal 50 mg PO Daily for about one week. The patient denies any new medical problems since last appointment with this facility. The patient reports compliance with current medication regimen. The patient denies any current side effects from their current medication regimen. The patient denies any abnormal muscle movements or tics. The patient rates their depression on average in the past week at a 3/10 on a 0-10 scale, with 10 being the worst. The patient rates their anxiety on average the past week at a 5/10 on a 0-10 scale, with 10 being the worst. The patient would like to increase their medications at this visit. The patient denies any suicidal or homicidal ideations, plans, or intent at today's encounter and is convincing. The patient denies any auditory hallucinations or visual hallucinations. The patient does not endorse any significant symptoms consistent with leidy or psychosis at today's encounter.     *If the patient has any concerns or needs assistance, they may call the [facility] at [phone]*        Prior Psychiatric Medications:  Wellbutrin XL: is helpful  Naltrexone: is helpful   Hydroxyzine: helps, but is sedating  Trazodone: takes PRN and is helpful  Buspar: unsure if effective  Depakote ER: may be beneficial  Paxil:   Zoloft  Celexa  Prozac: may have been helpful  Cymbalta: may have been helpful  Seroquel: side effects; too sedating  Abilify: sedating  Vraylar: helpful        The following portions of the patient's history were reviewed and updated as appropriate: allergies, current medications, past family history, past medical history, past social history, past surgical history and problem list.          Past Medical History:  Past Medical History:   Diagnosis Date    Allergic     Seasonal, controlled with 2nd gen  antihistamine    Anxiety     Arthritis     Bilateral ovarian cysts     Bipolar disorder     managed by behavioral health, [facility]    Chronic pain     Depression     Obesity     Seizures     denied by patient    Sleep apnea     CPAP, managed by [facility]    Substance abuse     managed by [facility]       Social History:  Social History     Socioeconomic History    Marital status: Single   Tobacco Use    Smoking status: Former     Types: Electronic Cigarette     Quit date:      Years since quittin.6    Smokeless tobacco: Never   Vaping Use    Vaping Use: Every day    Substances: Nicotine, Flavoring   Substance and Sexual Activity    Alcohol use: Never    Drug use: Not Currently     Types: Amphetamines, Benzodiazepines, Fentanyl, Hydrocodone, Marijuana, MDMA (ecstacy), Methamphetamines, Morphine, Oxycodone, PCP     Comment: 2021 (Last use)    Sexual activity: Not Currently     Partners: Male     Birth control/protection: Condom, Abstinence       Family History:  Family History   Problem Relation Age of Onset    Mental illness Mother     Arthritis Father     Hypertension Father     Mental illness Father     Obesity Father     Bipolar disorder Father     Alcohol abuse Father     Sleep apnea Father     Kidney disease Paternal Aunt     Mental illness Paternal Aunt     Bipolar disorder Paternal Aunt     Cancer Paternal Aunt         unspecified    Mental illness Paternal Uncle     Cancer Paternal Uncle         unspecified    Cancer Paternal Grandmother         unspecified    Sleep apnea Other     Asthma Other     COPD Other     Restless legs syndrome Other        Past Surgical History:  Past Surgical History:   Procedure Laterality Date    ANKLE SURGERY Right     CARPAL TUNNEL RELEASE Left 2022    CARPAL TUNNEL RELEASE Right 2022    WISDOM TOOTH EXTRACTION         Problem List:  Patient Active Problem List   Diagnosis    Moderate episode of recurrent major depressive disorder    Sleep apnea     Bilateral carpal tunnel syndrome    Morbid obesity with BMI of 50.0-59.9, adult    History of substance abuse    Bipolar disorder    Substance abuse    Sleep apnea    Insulin resistance    Pure hypercholesterolemia       Allergy:   Allergies   Allergen Reactions    Augmentin [Amoxicillin-Pot Clavulanate] Other (See Comments)     Throat blisters    Crow Flavor [Flavoring Agent] Unknown - Low Severity    Vantin [Cefpodoxime] Other (See Comments)     Throat blisters        Current Medications:   Current Outpatient Medications   Medication Sig Dispense Refill    buPROPion XL (Wellbutrin XL) 150 MG 24 hr tablet Take 1 tablet PO QAM (in addition to 300 mg to = a total of 450 mg/daily) 30 tablet 1    buPROPion XL (Wellbutrin XL) 300 MG 24 hr tablet Take 1 tablet by mouth Every Morning. 30 tablet 1    busPIRone (BUSPAR) 15 MG tablet Take 1 tablet by mouth 2 (Two) Times a Day. 60 tablet 1    Cariprazine HCl (Vraylar) 3 MG capsule capsule Take 1 capsule by mouth Daily. 30 capsule 1    benzonatate (Tessalon Perles) 100 MG capsule Take 1 capsule by mouth 3 (Three) Times a Day As Needed for Cough. 30 capsule 0    buprenorphine-naloxone (SUBOXONE) 8-2 MG per SL tablet 1 tablet 2 (Two) Times a Day.      lamoTRIgine (LaMICtal) 100 MG tablet Take 1 tablet by mouth Daily. 30 tablet 1    phentermine 15 MG capsule Take 1 capsule by mouth Every Morning for 30 days. 30 capsule 0    predniSONE (DELTASONE) 20 MG tablet       pseudoephedrine-guaifenesin (MUCINEX D)  MG per 12 hr tablet Take 1 tablet by mouth Every 12 (Twelve) Hours. 30 tablet 0    psyllium (METAMUCIL) 0.52 g capsule Take 2 capsules by mouth 2 (Two) Times a Day. Titrate up as directed. Take with a minimum of 8oz water. 120 capsule 11    topiramate (Topamax) 25 MG tablet Take one daily at bedtime for a week then increase to 2 daily at bedtime 60 tablet 2     Current Facility-Administered Medications   Medication Dose Route Frequency Provider Last Rate Last Admin     cyanocobalamin injection 1,000 mcg  1,000 mcg Intramuscular Q28 Days Gracy Singh JENNIFER   1,000 mcg at 02/07/23 1607       Review of Symptoms:    Review of Systems   Constitutional:  Positive for appetite change.   Psychiatric/Behavioral:  Positive for stress. The patient is nervous/anxious.        Physical Exam:   Due to the remote nature of this encounter (virtual encounter), vitals were unable to be obtained.  Height stated at 61 inches.  Weight stated at 292 pounds.        Physical Exam  Neurological:      Mental Status: She is alert.   Psychiatric:         Attention and Perception: Attention and perception normal.         Mood and Affect: Mood and affect normal.         Speech: Speech normal.         Behavior: Behavior normal. Behavior is cooperative.         Thought Content: Thought content normal. Thought content is not paranoid or delusional. Thought content does not include homicidal or suicidal ideation. Thought content does not include homicidal or suicidal plan.         Cognition and Memory: Cognition and memory normal.         Judgment: Judgment normal.         Mental Status Exam:   Hygiene:   good  Cooperation:  Cooperative  Eye Contact:  Good  Psychomotor Behavior:  Appropriate  Affect:  Appropriate  Mood: normal  Speech:  Normal  Thought Process:  Linear  Thought Content:  Normal  Suicidal:  None  Homicidal:  None  Hallucinations:  None  Delusion:  None  Memory:  Intact  Orientation:  Person, Place, Time and Situation  Reliability:  good  Insight:  Good  Judgement:  Good  Impulse Control:  Good        Previous Provider notes and available records reviewed by this APRN at today's encounter.         Lab Results:   No visits with results within 1 Month(s) from this visit.   Latest known visit with results is:   Office Visit on 05/22/2023   Component Date Value Ref Range Status    SARS Antigen 05/22/2023 Not Detected  Not Detected, Presumptive Negative Final    Influenza A Antigen GABRIELLE  05/22/2023 Not Detected  Not Detected Final    Influenza B Antigen GABRIELLE 05/22/2023 Not Detected  Not Detected Final    Internal Control 05/22/2023 Passed  Passed Final    Lot Number 05/22/2023 [id number]   Final    Expiration Date 05/22/2023 3-23-24   Final         Assessment & Plan   Problems Addressed this Visit    None  Visit Diagnoses       Bipolar I disorder, most recent episode depressed  (Chronic)   -  Primary    Relevant Medications    Cariprazine HCl (Vraylar) 3 MG capsule capsule    busPIRone (BUSPAR) 15 MG tablet    buPROPion XL (Wellbutrin XL) 150 MG 24 hr tablet    buPROPion XL (Wellbutrin XL) 300 MG 24 hr tablet    lamoTRIgine (LaMICtal) 100 MG tablet    Generalized anxiety disorder  (Chronic)       Relevant Medications    Cariprazine HCl (Vraylar) 3 MG capsule capsule    busPIRone (BUSPAR) 15 MG tablet    buPROPion XL (Wellbutrin XL) 150 MG 24 hr tablet    buPROPion XL (Wellbutrin XL) 300 MG 24 hr tablet    Amphetamine-type substance use disorder, moderate, in sustained remission              Diagnoses         Codes Comments    Bipolar I disorder, most recent episode depressed    -  Primary ICD-10-CM: F31.30  ICD-9-CM: 296.50     Generalized anxiety disorder     ICD-10-CM: F41.1  ICD-9-CM: 300.02     Amphetamine-type substance use disorder, moderate, in sustained remission     ICD-10-CM: F15.21  ICD-9-CM: 304.43             Visit Diagnoses:    ICD-10-CM ICD-9-CM   1. Bipolar I disorder, most recent episode depressed  F31.30 296.50   2. Generalized anxiety disorder  F41.1 300.02   3. Amphetamine-type substance use disorder, moderate, in sustained remission  F15.21 304.43          GOALS:  Short Term Goals: Patient will be compliant with medication, and patient will have no significant medication related side effects.  Patient will be engaged in psychotherapy as indicated.  Patient will report subjective improvement of symptoms.  Long term goals: To stabilize mood and treat/improve subjective symptoms, the  patient will stay out of the hospital, the patient will be at an optimal level of functioning, and the patient will take all medications as prescribed.  The patient verbalized understanding and agreement with goals that were mutually set.      TREATMENT PLAN:   -Continue Buspar 15 mg PO BID for anxiety.   -Continue Vraylar 3 mg PO Daily for bipolar disorder  -Pt is prescribed Suboxone from another provider  -Continue Wellbutrin  mg PO QAM for bipolar depression  -Continue Lamictal 50 mg PO Daily for one more week and then increase to 100 mg PO Daily for mood stabilization    *Pt has agreed to call this APRN if she experiences any symptoms of leidy/hypomania.*    Discussed medication options and treatment plan of prescribed medication, any off label use of medication, as well as the risks, benefits, any black box warnings including increased suicidality, and side effects including but not limited to potential falls, dizziness, possible impaired driving, GI side effects (change in appetite, abdominal discomfort, nausea, vomiting, diarrhea, and/or constipation), dry mouth, somnolence, sedation, insomnia, activation, agitation, irritation, tremors, abnormal muscle movements or disorders, tardive dyskinesia, akathisia, asthenia, headache, sweating, possible bruising or rare bleeding, electrolyte and/or fluid abnormalities, change in blood pressure/heart rate/and or heart rhythm, hypotension, sexual dysfunction, rare impulse control problems, rare seizures, rare neuroleptic malignant syndrome, increased risk of death and cerebrovascular events, change in blood glucose and increased risk for diabetes, change in triglycerides and cholesterol and increased risk for dyslipidemia,  weight gain, weight gain that can become problematic to health, skin conditions and reactions, and metabolic adversities among others. Patient and/or guardian are agreeable to call the office with any worsening of symptoms or onset of side  effects, or if any concerns or questions arise.  The contact information for the office is made available to the patient and/or guardian. Patient and/or guardian are agreeable to call 911 or go to the nearest ER should they begin having any SI/HI, or if any urgent concerns arise.    This APRN has discussed the benefits and risks of taking/continuing Lamictal (Lamotrigine).  The side effects of Lamictal can include a benign rash, blurred or double vision, dizziness, ataxia, sedation, headache, tremor, insomnia, poor coordination, fatigue,  nausea, vomiting, dyspepsia, rhinitis, infection, pharyngitis, asthenia, a rare but serious rash, rare multi-organ failure associated with Nesbitt-Lul Syndrome, toxic epidermal necrolysis, drug hypersensitivity syndrome, rare blood dyscrasias, rare aseptic meningitis, rare sudden unexplained deaths in people with epilepsy, withdrawal seizures upon abrupt withdrawal, and rare activation of suicidal ideation and behavior (suicidality).  This APRN has discussed that a very slow dose titration when starting, or changing doses, of Lamictal may reduce the incidence of skin rash and other side effects.  The dosage should not be titrated upwards or increased faster than recommended due to the possibility of the discussed side effects and risk of development of a skin rash (which can become life threatening).    This APRN has also discussed that if the patient stops taking the Lamictal for 3-5 days or longer, it will be necessary to restart the drug with an initial dose titration, as rashes have been reported on reexposure.  If the patient and Provider decide to stop the Lamictal, the patient will follow the directions of this APRN/this office as a guided taper over about two weeks is appropriate due to the risk of relapse in bipolar disorder with those with a mood or bipolar disorder, the risk of seizures in those with epilepsy, and discontinuation symptoms upon rapid discontinuation  of Lamictal.    The patient verbalizes understanding of benefits and risks as discussed, the patient/guardian feels the benefits outweigh the risks and is agreeable to continue/take Lamictal as discussed.  The patient is advised should any side effects or rash develops they are to stop the Lamictal immediately and contact this APRN/this office or go to the emergency department immediately.  The patient verbalizes understanding and agreement with treatment plan in their own words.      Medication and treatment options, both pharmacological and non-pharmacological treatment options, discussed during today's visit, including any off label use of medication. Patient acknowledged and verbally consented with current treatment plan and was educated on the importance of compliance with treatment and follow-up appointments.        MEDICATION ISSUES:    Discussed treatment plan and medication options of prescribed medication as well as the risks, benefits, any black box warnings, and side effects including potential falls, possible impaired driving, and metabolic adversities among others, including any off label use of medication. Patient is agreeable to call the office with any worsening of symptoms or onset of side effects, or if any concerns or questions arise.  The contact information for the office is made available to the patient. Patient is agreeable to call 911 or go to the nearest ER should they begin having any SI/HI, or if any urgent concerns arise.       SUICIDE RISK ASSESSMENT: Unalterable demographics and a history of mental health intervention indicate this patient is in a high risk category compared to the general population. At present, the patient denies active SI/HI, intentions, or plans at this time and agrees to seek immediate care should such thoughts develop. The patient verbalizes understanding of how to access emergency care if needed and agrees to do so. Consideration of suicide risk and protective  factors such as history, current presentation, individual strengths and weaknesses, psychosocial and environmental stressors and variables, psychiatric illness and symptoms, medical conditions and pain, took place in this interview. Based on those considerations, the patient is determined: within individual baseline and presenting no imminent risk for suicide or homicide. Other recommendations: The patient does not meet the criteria for inpatient admission and is not a safety risk to self or others at today's visit. Inpatient treatment offers no significant advantages over outpatient treatment for this patient at today's visit.      SAFETY PLAN:  Patient was given ample time for questions and fully participated in treatment planning.  Patient was encouraged to call the clinic with any questions or concerns.  Patient was informed of access to emergency care. If patient were to develop any significant symptomatology, suicidal ideation, homicidal ideation, any concerns, or feel unsafe at any time they are to call the clinic and if unable to get immediate assistance should immediately call 911 or go to the nearest emergency room.  The patient is advised to remove or secure (lock away) all lethal weapons (including guns) and sharps (including razors, scissors, knives, etc.).  All medications (including any prescribed and any over the counter medications) should be stored in a safe and secured location that is not obtainable by children/adolescents.  Patient was given an opportunity and encouraged to ask questions about their medication, illness, and treatment. Patient contracted verbally for the following: If you are experiencing an emotional crisis or have thoughts of harming yourself or others, please go to your nearest local emergency room or call 911. Will continue to re-assess medication response and side effects frequently to establish efficacy and ensure safety. Risks, any black box warnings, side effects, off  label usage, and benefits of medication and treatment discussed with patient, along with potential adverse side effects of current and/or newly prescribed medication, alternative treatment options, and OTC medications.  Patient verbalized understanding of potential risks, any off label use of medication, any black box warnings, and any side effects in their own words. The patient verbalized understanding and agreed to comply with the safety plan discussed in their own words.  Patient given the number to the office. Number also available to the 24- hour suicide hotline.      MEDS ORDERED DURING VISIT:  New Medications Ordered This Visit   Medications    Cariprazine HCl (Vraylar) 3 MG capsule capsule     Sig: Take 1 capsule by mouth Daily.     Dispense:  30 capsule     Refill:  1    busPIRone (BUSPAR) 15 MG tablet     Sig: Take 1 tablet by mouth 2 (Two) Times a Day.     Dispense:  60 tablet     Refill:  1    buPROPion XL (Wellbutrin XL) 150 MG 24 hr tablet     Sig: Take 1 tablet PO QAM (in addition to 300 mg to = a total of 450 mg/daily)     Dispense:  30 tablet     Refill:  1    buPROPion XL (Wellbutrin XL) 300 MG 24 hr tablet     Sig: Take 1 tablet by mouth Every Morning.     Dispense:  30 tablet     Refill:  1    lamoTRIgine (LaMICtal) 100 MG tablet     Sig: Take 1 tablet by mouth Daily.     Dispense:  30 tablet     Refill:  1       Return in about 5 weeks (around 9/28/2023), or if symptoms worsen or fail to improve, for Recheck.     Treatment plan completed: 1/26/23    Progress toward goal: Not at goal    Functional Status: Mild impairment     Prognosis: Good with Ongoing Treatment         This document has been electronically signed by JENNIFER Reed  August 24, 2023 12:15 EDT     Some of the data in this electronic note has been brought forward from a previous encounter, any necessary changes have been made, it has been reviewed by this APRN, and it is accurate.    Please note that portions of this note were  completed with a voice recognition program. Efforts were made to edit dictation, but occasionally words are mistranscribed.

## 2023-08-27 DIAGNOSIS — F31.30 BIPOLAR I DISORDER, MOST RECENT EPISODE DEPRESSED: Chronic | ICD-10-CM

## 2023-08-28 DIAGNOSIS — F31.30 BIPOLAR I DISORDER, MOST RECENT EPISODE DEPRESSED: Chronic | ICD-10-CM

## 2023-08-28 DIAGNOSIS — F41.1 GENERALIZED ANXIETY DISORDER: Chronic | ICD-10-CM

## 2023-08-28 RX ORDER — BUPROPION HYDROCHLORIDE 300 MG/1
TABLET ORAL
Qty: 30 TABLET | Refills: 1 | OUTPATIENT
Start: 2023-08-28

## 2023-08-28 RX ORDER — LAMOTRIGINE 25 MG/1
TABLET ORAL
Qty: 46 TABLET | Refills: 0 | OUTPATIENT
Start: 2023-08-28

## 2023-09-26 ENCOUNTER — TELEMEDICINE (OUTPATIENT)
Dept: PSYCHIATRY | Facility: CLINIC | Age: 33
End: 2023-09-26
Payer: COMMERCIAL

## 2023-09-26 DIAGNOSIS — F41.1 GENERALIZED ANXIETY DISORDER: Chronic | ICD-10-CM

## 2023-09-26 DIAGNOSIS — F15.21: Chronic | ICD-10-CM

## 2023-09-26 DIAGNOSIS — F31.30 BIPOLAR I DISORDER, MOST RECENT EPISODE DEPRESSED: Primary | Chronic | ICD-10-CM

## 2023-09-26 PROCEDURE — 1159F MED LIST DOCD IN RCRD: CPT | Performed by: NURSE PRACTITIONER

## 2023-09-26 PROCEDURE — 1160F RVW MEDS BY RX/DR IN RCRD: CPT | Performed by: NURSE PRACTITIONER

## 2023-09-26 PROCEDURE — 99214 OFFICE O/P EST MOD 30 MIN: CPT | Performed by: NURSE PRACTITIONER

## 2023-09-26 NOTE — PROGRESS NOTES
This provider is completing this appointment through [facility] (through [facility]), [address], [facility], [address] using a secure [employer] Video Visit through [employer]. Patient is being seen remotely via telehealth in [place], and stated they are in a secure environment for this session. The patient's condition being diagnosed/treated is appropriate for telemedicine. The provider identified herself as well as her credentials.   The patient, and/or patients guardian, consent to be seen remotely, and when consent is given they understand that the consent allows for patient identifiable information to be sent to a third party as needed.   They may refuse to be seen remotely at any time. The electronic data is encrypted and password protected, and the patient and/or guardian has been advised of the potential risks to privacy not withstanding such measures.    You have chosen to receive care through a telehealth visit.  Do you consent to use a video/audio connection for your medical care today? Yes    Patient identifiers utilized: Name and date of birth.        Subjective   Franca Ruiz is a 33 y.o. female who presents today for follow up    Chief Complaint:  Bipolar disorder, anxiety    Accompanied by: Pt was alone for duration of appointment    History of Present Illness:   Pt states she has been doing better now that she is off the steroids. Pt's right eye hasn't give her any further trouble. Pt reports school has been challenging, but has been going well. Pt has a B in the math and an A in the other courses. Pt is also working 50 or more hours at her job. Pt's mother still hasn't spoken to her since her release from the hospital. Mood has been stable. Pt denies leidy and depressive episodes since her last appointment. Pt reports her mood usually declines in November or December. Pt states the lack of sunlight is an issue. Pt has an appointment next month with the  weight loss clinic. She did start back Topamax 50 mg PO QHS, which decreases her appetite. Pt continues to maintain sobriety - January 21, 2024, will make three years! Sleep has been stable; she continues to use her CPAP. The patient denies any new medical problems since last appointment with this facility. The patient reports compliance with current medication regimen. The patient denies any current side effects from their current medication regimen. The patient denies any abnormal muscle movements or tics. The patient rates their depression on average in the past week at a 2-3/10 on a 0-10 scale, with 10 being the worst. The patient rates their anxiety on average the past week at a 1/10 on a 0-10 scale, with 10 being the worst. The patient would like to not adjust or change their medications at this visit. The patient denies any suicidal or homicidal ideations, plans, or intent at today's encounter and is convincing. The patient denies any auditory hallucinations or visual hallucinations. The patient does not endorse any significant symptoms consistent with leidy or psychosis at today's encounter.     *If the patient has any concerns or needs assistance, they may call the [facility] at [phone]*        Prior Psychiatric Medications:  Wellbutrin XL: is helpful  Naltrexone: is helpful   Hydroxyzine: helps, but is sedating  Trazodone: takes PRN and is helpful  Buspar: unsure if effective  Depakote ER: may be beneficial  Paxil:   Zoloft  Celexa  Prozac: may have been helpful  Cymbalta: may have been helpful  Seroquel: side effects; too sedating  Abilify: sedating  Vraylar: helpful        The following portions of the patient's history were reviewed and updated as appropriate: allergies, current medications, past family history, past medical history, past social history, past surgical history and problem list.          Past Medical History:  Past Medical History:   Diagnosis Date     Allergic     Seasonal, controlled with 2nd gen antihistamine    Anxiety     Arthritis     Bilateral ovarian cysts     Bipolar disorder     managed by behavioral health, [facility]    Chronic pain     Depression     Obesity     Seizures     denied by patient    Sleep apnea     CPAP, managed by [facility]    Substance abuse     [facility]       Social History:  Social History     Socioeconomic History    Marital status: Single   Tobacco Use    Smoking status: Former     Types: Electronic Cigarette     Quit date:      Years since quittin.7    Smokeless tobacco: Never   Vaping Use    Vaping Use: Every day    Substances: Nicotine, Flavoring   Substance and Sexual Activity    Alcohol use: Never    Drug use: Not Currently     Types: Amphetamines, Benzodiazepines, Fentanyl, Hydrocodone, Marijuana, MDMA (ecstacy), Methamphetamines, Morphine, Oxycodone, PCP     Comment: 2021 (Last use)    Sexual activity: Not Currently     Partners: Male     Birth control/protection: Condom, Abstinence       Family History:  Family History   Problem Relation Age of Onset    Mental illness Mother     Arthritis Father     Hypertension Father     Mental illness Father     Obesity Father     Bipolar disorder Father     Alcohol abuse Father     Sleep apnea Father     Kidney disease Paternal Aunt     Mental illness Paternal Aunt     Bipolar disorder Paternal Aunt     Cancer Paternal Aunt         unspecified    Mental illness Paternal Uncle     Cancer Paternal Uncle         unspecified    Cancer Paternal Grandmother         unspecified    Sleep apnea Other     Asthma Other     COPD Other     Restless legs syndrome Other        Past Surgical History:  Past Surgical History:   Procedure Laterality Date    ANKLE SURGERY Right     CARPAL TUNNEL RELEASE Left 2022    CARPAL TUNNEL RELEASE Right 2022    WISDOM TOOTH EXTRACTION         Problem List:  Patient Active Problem List   Diagnosis    Moderate episode of  recurrent major depressive disorder    Sleep apnea    Bilateral carpal tunnel syndrome    Morbid obesity with BMI of 50.0-59.9, adult    History of substance abuse    Bipolar disorder    Substance abuse    Sleep apnea    Insulin resistance    Pure hypercholesterolemia       Allergy:   Allergies   Allergen Reactions    Augmentin [Amoxicillin-Pot Clavulanate] Other (See Comments)     Throat blisters    Jordan Flavor [Flavoring Agent] Unknown - Low Severity    Vantin [Cefpodoxime] Other (See Comments)     Throat blisters        Current Medications:   Current Outpatient Medications   Medication Sig Dispense Refill    topiramate (Topamax) 25 MG tablet Take one daily at bedtime for a week then increase to 2 daily at bedtime (Patient taking differently: Take 2 tablets PO QHS) 60 tablet 2    benzonatate (Tessalon Perles) 100 MG capsule Take 1 capsule by mouth 3 (Three) Times a Day As Needed for Cough. 30 capsule 0    buprenorphine-naloxone (SUBOXONE) 8-2 MG per SL tablet 1 tablet 2 (Two) Times a Day.      buPROPion XL (Wellbutrin XL) 150 MG 24 hr tablet Take 1 tablet PO QAM (in addition to 300 mg to = a total of 450 mg/daily) 30 tablet 1    buPROPion XL (Wellbutrin XL) 300 MG 24 hr tablet Take 1 tablet by mouth Every Morning. 30 tablet 1    busPIRone (BUSPAR) 15 MG tablet Take 1 tablet by mouth 2 (Two) Times a Day. 60 tablet 1    Cariprazine HCl (Vraylar) 3 MG capsule capsule Take 1 capsule by mouth Daily. 30 capsule 1    lamoTRIgine (LaMICtal) 100 MG tablet Take 1 tablet by mouth Daily. 30 tablet 1    phentermine 15 MG capsule Take 1 capsule by mouth Every Morning for 30 days. 30 capsule 0    predniSONE (DELTASONE) 20 MG tablet       pseudoephedrine-guaifenesin (MUCINEX D)  MG per 12 hr tablet Take 1 tablet by mouth Every 12 (Twelve) Hours. 30 tablet 0    psyllium (METAMUCIL) 0.52 g capsule Take 2 capsules by mouth 2 (Two) Times a Day. Titrate up as directed. Take with a minimum of 8oz water. 120 capsule 11      Current Facility-Administered Medications   Medication Dose Route Frequency Provider Last Rate Last Admin    cyanocobalamin injection 1,000 mcg  1,000 mcg Intramuscular Q28 Days Gracy Singh APRN   1,000 mcg at 02/07/23 1607       Review of Symptoms:    Review of Systems   Constitutional:  Positive for appetite change.   Psychiatric/Behavioral: Negative.         Physical Exam:   Due to the remote nature of this encounter (virtual encounter), vitals were unable to be obtained.  Height stated at 61 inches.  Weight stated at 310 pounds.        Physical Exam  Neurological:      Mental Status: She is alert.   Psychiatric:         Attention and Perception: Attention and perception normal. She does not perceive auditory or visual hallucinations.         Mood and Affect: Mood normal.         Speech: Speech normal.         Behavior: Behavior normal. Behavior is cooperative.         Thought Content: Thought content normal. Thought content is not paranoid or delusional. Thought content does not include homicidal or suicidal ideation. Thought content does not include homicidal or suicidal plan.         Cognition and Memory: Cognition and memory normal.         Judgment: Judgment normal.      Comments: Restricted affect         Mental Status Exam:   Hygiene:   good  Cooperation:  Cooperative  Eye Contact:  Good  Psychomotor Behavior:  Appropriate  Affect:  Restricted  Mood: normal  Speech:  Normal  Thought Process:  Linear  Thought Content:  Normal  Suicidal:  None  Homicidal:  None  Hallucinations:  None  Delusion:  None  Memory:  Intact  Orientation:  Person, Place, Time and Situation  Reliability:  good  Insight:  Good  Judgement:  Good  Impulse Control:  Good        Previous Provider notes and available records reviewed by this APRN at today's encounter.         Lab Results:   No visits with results within 1 Month(s) from this visit.   Latest known visit with results is:   Office Visit on 05/22/2023    Component Date Value Ref Range Status    SARS Antigen 05/22/2023 Not Detected  Not Detected, Presumptive Negative Final    Influenza A Antigen GABRIELLE 05/22/2023 Not Detected  Not Detected Final    Influenza B Antigen GABRIELLE 05/22/2023 Not Detected  Not Detected Final    Internal Control 05/22/2023 Passed  Passed Final    Lot Number 05/22/2023 [id number]   Final    Expiration Date 05/22/2023 3-23-24   Final         Assessment & Plan   Problems Addressed this Visit    None  Visit Diagnoses       Bipolar I disorder, most recent episode depressed  (Chronic)   -  Primary    Generalized anxiety disorder  (Chronic)       Amphetamine-type substance use disorder, moderate, in sustained remission  (Chronic)             Diagnoses         Codes Comments    Bipolar I disorder, most recent episode depressed    -  Primary ICD-10-CM: F31.30  ICD-9-CM: 296.50     Generalized anxiety disorder     ICD-10-CM: F41.1  ICD-9-CM: 300.02     Amphetamine-type substance use disorder, moderate, in sustained remission     ICD-10-CM: F15.21  ICD-9-CM: 304.43             Visit Diagnoses:    ICD-10-CM ICD-9-CM   1. Bipolar I disorder, most recent episode depressed  F31.30 296.50   2. Generalized anxiety disorder  F41.1 300.02   3. Amphetamine-type substance use disorder, moderate, in sustained remission  F15.21 304.43          GOALS:  Short Term Goals: Patient will be compliant with medication, and patient will have no significant medication related side effects.  Patient will be engaged in psychotherapy as indicated.  Patient will report subjective improvement of symptoms.  Long term goals: To stabilize mood and treat/improve subjective symptoms, the patient will stay out of the hospital, the patient will be at an optimal level of functioning, and the patient will take all medications as prescribed.  The patient verbalized understanding and agreement with goals that were mutually set.      TREATMENT PLAN:   -Continue Buspar 15 mg PO BID for anxiety.    -Continue Vraylar 3 mg PO Daily for bipolar disorder  -Pt is prescribed Suboxone from another provider.   -Continue Wellbutrin  mg PO QAM for bipolar depression  -Continue Lamictal 100 mg PO Daily for mood stabilization    *Pt has agreed to call this APRN if she experiences any symptoms of leidy/hypomania.*    Discussed medication options and treatment plan of prescribed medication, any off label use of medication, as well as the risks, benefits, any black box warnings including increased suicidality, and side effects including but not limited to potential falls, dizziness, possible impaired driving, GI side effects (change in appetite, abdominal discomfort, nausea, vomiting, diarrhea, and/or constipation), dry mouth, somnolence, sedation, insomnia, activation, agitation, irritation, tremors, abnormal muscle movements or disorders, tardive dyskinesia, akathisia, asthenia, headache, sweating, possible bruising or rare bleeding, electrolyte and/or fluid abnormalities, change in blood pressure/heart rate/and or heart rhythm, hypotension, sexual dysfunction, rare impulse control problems, rare seizures, rare neuroleptic malignant syndrome, increased risk of death and cerebrovascular events, change in blood glucose and increased risk for diabetes, change in triglycerides and cholesterol and increased risk for dyslipidemia,  weight gain, weight gain that can become problematic to health, skin conditions and reactions, and metabolic adversities among others. Patient and/or guardian are agreeable to call the office with any worsening of symptoms or onset of side effects, or if any concerns or questions arise.  The contact information for the office is made available to the patient and/or guardian. Patient and/or guardian are agreeable to call 911 or go to the nearest ER should they begin having any SI/HI, or if any urgent concerns arise.    This APRN has discussed the benefits and risks of taking/continuing Lamictal  (Lamotrigine).  The side effects of Lamictal can include a benign rash, blurred or double vision, dizziness, ataxia, sedation, headache, tremor, insomnia, poor coordination, fatigue,  nausea, vomiting, dyspepsia, rhinitis, infection, pharyngitis, asthenia, a rare but serious rash, rare multi-organ failure associated with Nesbitt-Lul Syndrome, toxic epidermal necrolysis, drug hypersensitivity syndrome, rare blood dyscrasias, rare aseptic meningitis, rare sudden unexplained deaths in people with epilepsy, withdrawal seizures upon abrupt withdrawal, and rare activation of suicidal ideation and behavior (suicidality).  This APRN has discussed that a very slow dose titration when starting, or changing doses, of Lamictal may reduce the incidence of skin rash and other side effects.  The dosage should not be titrated upwards or increased faster than recommended due to the possibility of the discussed side effects and risk of development of a skin rash (which can become life threatening).    This APRN has also discussed that if the patient stops taking the Lamictal for 3-5 days or longer, it will be necessary to restart the drug with an initial dose titration, as rashes have been reported on reexposure.  If the patient and Provider decide to stop the Lamictal, the patient will follow the directions of this APRN/this office as a guided taper over about two weeks is appropriate due to the risk of relapse in bipolar disorder with those with a mood or bipolar disorder, the risk of seizures in those with epilepsy, and discontinuation symptoms upon rapid discontinuation of Lamictal.    The patient verbalizes understanding of benefits and risks as discussed, the patient/guardian feels the benefits outweigh the risks and is agreeable to continue/take Lamictal as discussed.  The patient is advised should any side effects or rash develops they are to stop the Lamictal immediately and contact this APRN/this office or go to the  emergency department immediately. The patient verbalizes understanding and agreement with treatment plan in their own words.      Medication and treatment options, both pharmacological and non-pharmacological treatment options, discussed during today's visit, including any off label use of medication. Patient acknowledged and verbally consented with current treatment plan and was educated on the importance of compliance with treatment and follow-up appointments.        MEDICATION ISSUES:    Discussed treatment plan and medication options of prescribed medication as well as the risks, benefits, any black box warnings, and side effects including potential falls, possible impaired driving, and metabolic adversities among others, including any off label use of medication. Patient is agreeable to call the office with any worsening of symptoms or onset of side effects, or if any concerns or questions arise.  The contact information for the office is made available to the patient. Patient is agreeable to call 911 or go to the nearest ER should they begin having any SI/HI, or if any urgent concerns arise.       SUICIDE RISK ASSESSMENT: Unalterable demographics and a history of mental health intervention indicate this patient is in a high risk category compared to the general population. At present, the patient denies active SI/HI, intentions, or plans at this time and agrees to seek immediate care should such thoughts develop. The patient verbalizes understanding of how to access emergency care if needed and agrees to do so. Consideration of suicide risk and protective factors such as history, current presentation, individual strengths and weaknesses, psychosocial and environmental stressors and variables, psychiatric illness and symptoms, medical conditions and pain, took place in this interview. Based on those considerations, the patient is determined: within individual baseline and presenting no imminent risk for suicide  or homicide. Other recommendations: The patient does not meet the criteria for inpatient admission and is not a safety risk to self or others at today's visit. Inpatient treatment offers no significant advantages over outpatient treatment for this patient at today's visit.      SAFETY PLAN:  Patient was given ample time for questions and fully participated in treatment planning.  Patient was encouraged to call the clinic with any questions or concerns.  Patient was informed of access to emergency care. If patient were to develop any significant symptomatology, suicidal ideation, homicidal ideation, any concerns, or feel unsafe at any time they are to call the clinic and if unable to get immediate assistance should immediately call 911 or go to the nearest emergency room.  The patient is advised to remove or secure (lock away) all lethal weapons (including guns) and sharps (including razors, scissors, knives, etc.).  All medications (including any prescribed and any over the counter medications) should be stored in a safe and secured location that is not obtainable by children/adolescents.  Patient was given an opportunity and encouraged to ask questions about their medication, illness, and treatment. Patient contracted verbally for the following: If you are experiencing an emotional crisis or have thoughts of harming yourself or others, please go to your nearest local emergency room or call 911. Will continue to re-assess medication response and side effects frequently to establish efficacy and ensure safety. Risks, any black box warnings, side effects, off label usage, and benefits of medication and treatment discussed with patient, along with potential adverse side effects of current and/or newly prescribed medication, alternative treatment options, and OTC medications.  Patient verbalized understanding of potential risks, any off label use of medication, any black box warnings, and any side effects in their own  words. The patient verbalized understanding and agreed to comply with the safety plan discussed in their own words.  Patient given the number to the office. Number also available to the 24- hour suicide hotline.      MEDS ORDERED DURING VISIT:  No orders of the defined types were placed in this encounter.      Return in about 4 weeks (around 10/24/2023), or if symptoms worsen or fail to improve, for Recheck.     Treatment plan completed: 1/26/23    Progress toward goal: Not at goal    Functional Status: Mild impairment     Prognosis: Good with Ongoing Treatment         This document has been electronically signed by JENNIFER Reed  September 26, 2023 16:42 EDT     Some of the data in this electronic note has been brought forward from a previous encounter, any necessary changes have been made, it has been reviewed by this APRN, and it is accurate.    Please note that portions of this note were completed with a voice recognition program. Efforts were made to edit dictation, but occasionally words are mistranscribed.

## 2023-10-26 DIAGNOSIS — F41.1 GENERALIZED ANXIETY DISORDER: Chronic | ICD-10-CM

## 2023-10-26 DIAGNOSIS — F31.30 BIPOLAR I DISORDER, MOST RECENT EPISODE DEPRESSED: Chronic | ICD-10-CM

## 2023-10-26 RX ORDER — LAMOTRIGINE 100 MG/1
100 TABLET ORAL DAILY
Qty: 30 TABLET | Refills: 0 | Status: SHIPPED | OUTPATIENT
Start: 2023-10-26

## 2023-10-26 RX ORDER — BUPROPION HYDROCHLORIDE 300 MG/1
300 TABLET ORAL EVERY MORNING
Qty: 30 TABLET | Refills: 0 | Status: SHIPPED | OUTPATIENT
Start: 2023-10-26

## 2023-10-26 NOTE — TELEPHONE ENCOUNTER
I will refill her medications. Please schedule pt a follow-up appointment within the next four weeks. Thank you.

## 2023-11-20 ENCOUNTER — TELEMEDICINE (OUTPATIENT)
Dept: PSYCHIATRY | Facility: CLINIC | Age: 33
End: 2023-11-20
Payer: COMMERCIAL

## 2023-11-20 DIAGNOSIS — F31.30 BIPOLAR I DISORDER, MOST RECENT EPISODE DEPRESSED: Primary | Chronic | ICD-10-CM

## 2023-11-20 DIAGNOSIS — F41.1 GENERALIZED ANXIETY DISORDER: Chronic | ICD-10-CM

## 2023-11-20 DIAGNOSIS — F15.21: ICD-10-CM

## 2023-11-20 PROCEDURE — 1159F MED LIST DOCD IN RCRD: CPT | Performed by: NURSE PRACTITIONER

## 2023-11-20 PROCEDURE — 1160F RVW MEDS BY RX/DR IN RCRD: CPT | Performed by: NURSE PRACTITIONER

## 2023-11-20 PROCEDURE — 99214 OFFICE O/P EST MOD 30 MIN: CPT | Performed by: NURSE PRACTITIONER

## 2023-11-20 RX ORDER — BUSPIRONE HYDROCHLORIDE 15 MG/1
15 TABLET ORAL 2 TIMES DAILY
Qty: 60 TABLET | Refills: 0 | Status: SHIPPED | OUTPATIENT
Start: 2023-11-20

## 2023-11-20 RX ORDER — LAMOTRIGINE 100 MG/1
100 TABLET ORAL DAILY
Qty: 30 TABLET | Refills: 0 | Status: SHIPPED | OUTPATIENT
Start: 2023-11-20

## 2023-11-20 RX ORDER — ARIPIPRAZOLE 10 MG/1
TABLET ORAL
Qty: 30 TABLET | Refills: 0 | Status: SHIPPED | OUTPATIENT
Start: 2023-11-20

## 2023-11-20 RX ORDER — BUPROPION HYDROCHLORIDE 300 MG/1
300 TABLET ORAL EVERY MORNING
Qty: 30 TABLET | Refills: 0 | Status: SHIPPED | OUTPATIENT
Start: 2023-11-20

## 2023-11-20 NOTE — PROGRESS NOTES
This provider is completing this appointment through [facility] (through [facility]), [address] [facility] [place] [facility] [address], [facility], [address] using a secure Realtime [employer] Video Visit through [employer]. Patient is being seen remotely via telehealth in [place], and stated they are in a secure environment for this session. The patient's condition being diagnosed/treated is appropriate for telemedicine. The provider identified herself as well as her credentials.   The patient, and/or patients guardian, consent to be seen remotely, and when consent is given they understand that the consent allows for patient identifiable information to be sent to a third party as needed.   They may refuse to be seen remotely at any time. The electronic data is encrypted and password protected, and the patient and/or guardian has been advised of the potential risks to privacy not withstanding such measures.    You have chosen to receive care through a telehealth visit.  Do you consent to use a video/audio connection for your medical care today? Yes    Patient identifiers utilized: Name and date of birth.        Subjective   Franca Ruiz is a 33 y.o. female who presents today for follow up    Chief Complaint:  Bipolar disorder, anxiety    Accompanied by: Pt was alone for duration of appointment    History of Present Illness:   Pt was last seen by this APRN on 9/26/23.  Pt reports she has been doing well. She feels the Lamictal has helped with mood stabilization and decreasing her irritability. Pt states that her mood typically declines during the winter due to the weather. Sleep stable as is appetite. Pt continues to use her CPAP. Unfortunately, pt found out she is losing her insurance next month. Her employer does not currently offer health insurance and she cannot afford an individual policy. Pt is in the process of finding another job that offers health insurance. Pt would like to look at more affordable medication  options to manage her diagnoses because she will have to pay out of pocket. The patient denies any new medical problems since last appointment with this facility. The patient reports compliance with current medication regimen. The patient denies any current side effects from their current medication regimen. The patient denies any abnormal muscle movements or tics. The patient rates their depression on average in the past week at a 2-4/10 on a 0-10 scale, with 10 being the worst. The patient rates their anxiety on average the past week at a 2/10 on a 0-10 scale, with 10 being the worst. The pt would to adjust her medications to more affordable options this appointment. The patient denies any suicidal or homicidal ideations, plans, or intent at today's encounter and is convincing. The patient denies any auditory hallucinations or visual hallucinations. The patient does not endorse any significant symptoms consistent with leidy or psychosis at today's encounter.     *If the patient has any concerns or needs assistance, they may call the [facility] at [phone]*        Prior Psychiatric Medications:  Wellbutrin XL: is helpful  Naltrexone: is helpful   Hydroxyzine: helps, but is sedating  Trazodone: takes PRN and is helpful  Buspar: unsure if effective  Depakote ER: may be beneficial  Paxil:   Zoloft  Celexa  Prozac: may have been helpful  Cymbalta: may have been helpful  Seroquel: side effects; too sedating  Abilify: sedating  Vraylar: helpful        The following portions of the patient's history were reviewed and updated as appropriate: allergies, current medications, past family history, past medical history, past social history, past surgical history and problem list.          Past Medical History:  Past Medical History:   Diagnosis Date    Allergic     Seasonal, controlled with 2nd gen antihistamine    Anxiety     Arthritis     Bilateral ovarian cysts     Bipolar disorder      managed by behavioral health, [facility]    Chronic pain     Depression     Obesity     Seizures     denied by patient    Sleep apnea     CPAP, managed by [facility]    Substance abuse     [facility]       Social History:  Social History     Socioeconomic History    Marital status: Single   Tobacco Use    Smoking status: Former     Types: Electronic Cigarette     Quit date:      Years since quittin.8    Smokeless tobacco: Never   Vaping Use    Vaping Use: Every day    Substances: Nicotine, Flavoring   Substance and Sexual Activity    Alcohol use: Never    Drug use: Not Currently     Types: Amphetamines, Benzodiazepines, Fentanyl, Hydrocodone, Marijuana, MDMA (ecstacy), Methamphetamines, Morphine, Oxycodone, PCP     Comment: 2021 (Last use)    Sexual activity: Not Currently     Partners: Male     Birth control/protection: Condom, Abstinence       Family History:  Family History   Problem Relation Age of Onset    Mental illness Mother     Arthritis Father     Hypertension Father     Mental illness Father     Obesity Father     Bipolar disorder Father     Alcohol abuse Father     Sleep apnea Father     Kidney disease Paternal Aunt     Mental illness Paternal Aunt     Bipolar disorder Paternal Aunt     Cancer Paternal Aunt         unspecified    Mental illness Paternal Uncle     Cancer Paternal Uncle         unspecified    Cancer Paternal Grandmother         unspecified    Sleep apnea Other     Asthma Other     COPD Other     Restless legs syndrome Other        Past Surgical History:  Past Surgical History:   Procedure Laterality Date    ANKLE SURGERY Right     CARPAL TUNNEL RELEASE Left 2022    CARPAL TUNNEL RELEASE Right 2022    WISDOM TOOTH EXTRACTION         Problem List:  Patient Active Problem List   Diagnosis    Moderate episode of recurrent major depressive disorder    Sleep apnea    Bilateral carpal tunnel syndrome    Morbid obesity with BMI of 50.0-59.9, adult    History of  substance abuse    Bipolar disorder    Substance abuse    Sleep apnea    Insulin resistance    Pure hypercholesterolemia       Allergy:   Allergies   Allergen Reactions    Augmentin [Amoxicillin-Pot Clavulanate] Other (See Comments)     Throat blisters    [place] Flavor [Flavoring Agent] Unknown - Low Severity    Vantin [Cefpodoxime] Other (See Comments)     Throat blisters        Current Medications:   Current Outpatient Medications   Medication Sig Dispense Refill    buPROPion XL (WELLBUTRIN XL) 300 MG 24 hr tablet Take 1 tablet by mouth Every Morning. 30 tablet 0    busPIRone (BUSPAR) 15 MG tablet Take 1 tablet by mouth 2 (Two) Times a Day. 60 tablet 0    lamoTRIgine (LaMICtal) 100 MG tablet Take 1 tablet by mouth Daily. 30 tablet 0    ARIPiprazole (Abilify) 10 MG tablet Take 1/2 tablet PO Daily x7 days, then increase to 1 tablet PO Daily 30 tablet 0    azithromycin (ZITHROMAX) 250 MG tablet 2 tabs orally day 1, 1 tab orally once a days 2 through 5 6 tablet 0    buprenorphine-naloxone (SUBOXONE) 8-2 MG per SL tablet 1 tablet 2 (Two) Times a Day.      phentermine 15 MG capsule Take 1 capsule by mouth Every Morning for 30 days. 30 capsule 0    psyllium (METAMUCIL) 0.52 g capsule Take 2 capsules by mouth 2 (Two) Times a Day. Titrate up as directed. Take with a minimum of 8oz water. 120 capsule 11    topiramate (Topamax) 25 MG tablet Take one daily at bedtime for a week then increase to 2 daily at bedtime (Patient taking differently: Take 2 tablets PO QHS) 60 tablet 2     Current Facility-Administered Medications   Medication Dose Route Frequency Provider Last Rate Last Admin    cyanocobalamin injection 1,000 mcg  1,000 mcg Intramuscular Q28 Days Gracy Singh APRN   1,000 mcg at 02/07/23 1607       Review of Symptoms:    Review of Systems   Constitutional: Negative.    Psychiatric/Behavioral: Negative.  Positive for stress.          Physical Exam:   Due to the remote nature of this encounter (virtual  encounter), vitals were unable to be obtained.  Height stated at 61 inches.  Weight stated at 310 pounds.        Physical Exam  Neurological:      Mental Status: She is alert.   Psychiatric:         Attention and Perception: Attention and perception normal.         Mood and Affect: Mood normal.         Speech: Speech normal.         Behavior: Behavior normal. Behavior is cooperative.         Thought Content: Thought content normal. Thought content is not paranoid or delusional. Thought content does not include homicidal or suicidal ideation. Thought content does not include homicidal or suicidal plan.         Cognition and Memory: Cognition and memory normal.         Judgment: Judgment normal.      Comments: Restricted affect.            Mental Status Exam:   Hygiene:   good  Cooperation:  Cooperative  Eye Contact:  Good  Psychomotor Behavior:  Appropriate  Affect:  Restricted  Mood: normal  Speech:  Normal  Thought Process:  Goal directed  Thought Content:  Normal  Suicidal:  None  Homicidal:  None  Hallucinations:  None  Delusion:  None  Memory:  Intact  Orientation:  Person, Place, Time and Situation  Reliability:  good  Insight:  Good  Judgement:  Good  Impulse Control:  Good        Previous Provider notes and available records reviewed by this APRN at today's encounter.         Lab Results:   No visits with results within 1 Month(s) from this visit.   Latest known visit with results is:   Admission on 10/08/2023, Discharged on 10/08/2023   Component Date Value Ref Range Status    SARS Antigen 10/08/2023 Not Detected  Not Detected, Presumptive Negative Final    Influenza A Antigen GABRIELLE 10/08/2023 Not Detected  Not Detected Final    Influenza B Antigen GABRIELLE 10/08/2023 Not Detected  Not Detected Final    Internal Control 10/08/2023 Passed  Passed Final    Lot Number 10/08/2023 [id number]   Final    Expiration Date 10/08/2023 10-   Final         Assessment & Plan   Problems Addressed this Visit    None  Visit  Diagnoses       Bipolar I disorder, most recent episode depressed  (Chronic)   -  Primary    Relevant Medications    ARIPiprazole (Abilify) 10 MG tablet    lamoTRIgine (LaMICtal) 100 MG tablet    busPIRone (BUSPAR) 15 MG tablet    buPROPion XL (WELLBUTRIN XL) 300 MG 24 hr tablet    Generalized anxiety disorder  (Chronic)       Relevant Medications    ARIPiprazole (Abilify) 10 MG tablet    busPIRone (BUSPAR) 15 MG tablet    buPROPion XL (WELLBUTRIN XL) 300 MG 24 hr tablet    Amphetamine-type substance use disorder, moderate, in sustained remission              Diagnoses         Codes Comments    Bipolar I disorder, most recent episode depressed    -  Primary ICD-10-CM: F31.30  ICD-9-CM: 296.50     Generalized anxiety disorder     ICD-10-CM: F41.1  ICD-9-CM: 300.02     Amphetamine-type substance use disorder, moderate, in sustained remission     ICD-10-CM: F15.21  ICD-9-CM: 304.43             Visit Diagnoses:    ICD-10-CM ICD-9-CM   1. Bipolar I disorder, most recent episode depressed  F31.30 296.50   2. Generalized anxiety disorder  F41.1 300.02   3. Amphetamine-type substance use disorder, moderate, in sustained remission  F15.21 304.43            GOALS:  Short Term Goals: Patient will be compliant with medication, and patient will have no significant medication related side effects. Patient will be engaged in psychotherapy as indicated.  Patient will report subjective improvement of symptoms.  Long term goals: To stabilize mood and treat/improve subjective symptoms, the patient will stay out of the hospital, the patient will be at an optimal level of functioning, and the patient will take all medications as prescribed.  The patient verbalized understanding and agreement with goals that were mutually set.      TREATMENT PLAN:   -Continue Buspar 15 mg PO BID for anxiety   -Discontinue Vraylar  -Pt is prescribed Suboxone from another provider.   -Decrease Wellbutrin XL to 300 mg PO QAM for bipolar depression  -Start  Abilify 5 mg PO Daily x7 days, then increase to 10 mg PO Daily for bipolar disorder  -Continue Lamictal 100 mg PO Daily for mood stabilization    *Pt has agreed to call this APRN if she experiences any symptoms of leidy/hypomania.*    Discussed medication options and treatment plan of prescribed medication, any off label use of medication, as well as the risks, benefits, any black box warnings including increased suicidality, and side effects including but not limited to potential falls, dizziness, possible impaired driving, GI side effects (change in appetite, abdominal discomfort, nausea, vomiting, diarrhea, and/or constipation), dry mouth, somnolence, sedation, insomnia, activation, agitation, irritation, tremors, abnormal muscle movements or disorders, tardive dyskinesia, akathisia, asthenia, headache, sweating, possible bruising or rare bleeding, electrolyte and/or fluid abnormalities, change in blood pressure/heart rate/and or heart rhythm, hypotension, sexual dysfunction, rare impulse control problems, rare seizures, rare neuroleptic malignant syndrome, increased risk of death and cerebrovascular events, change in blood glucose and increased risk for diabetes, change in triglycerides and cholesterol and increased risk for dyslipidemia,  weight gain, weight gain that can become problematic to health, skin conditions and reactions, and metabolic adversities among others. Patient and/or guardian are agreeable to call the office with any worsening of symptoms or onset of side effects, or if any concerns or questions arise.  The contact information for the office is made available to the patient and/or guardian. Patient and/or guardian are agreeable to call 911 or go to the nearest ER should they begin having any SI/HI, or if any urgent concerns arise.    This APRN has discussed the benefits and risks of taking/continuing Lamictal (Lamotrigine).  The side effects of Lamictal can include a benign rash, blurred or  double vision, dizziness, ataxia, sedation, headache, tremor, insomnia, poor coordination, fatigue,  nausea, vomiting, dyspepsia, rhinitis, infection, pharyngitis, asthenia, a rare but serious rash, rare multi-organ failure associated with Nesbitt-Lul Syndrome, toxic epidermal necrolysis, drug hypersensitivity syndrome, rare blood dyscrasias, rare aseptic meningitis, rare sudden unexplained deaths in people with epilepsy, withdrawal seizures upon abrupt withdrawal, and rare activation of suicidal ideation and behavior (suicidality).  This APRN has discussed that a very slow dose titration when starting, or changing doses, of Lamictal may reduce the incidence of skin rash and other side effects.  The dosage should not be titrated upwards or increased faster than recommended due to the possibility of the discussed side effects and risk of development of a skin rash (which can become life threatening).    This APRN has also discussed that if the patient stops taking the Lamictal for 3-5 days or longer, it will be necessary to restart the drug with an initial dose titration, as rashes have been reported on reexposure.  If the patient and Provider decide to stop the Lamictal, the patient will follow the directions of this APRN/this office as a guided taper over about two weeks is appropriate due to the risk of relapse in bipolar disorder with those with a mood or bipolar disorder, the risk of seizures in those with epilepsy, and discontinuation symptoms upon rapid discontinuation of Lamictal.    The patient verbalizes understanding of benefits and risks as discussed, the patient/guardian feels the benefits outweigh the risks and is agreeable to continue/take Lamictal as discussed.  The patient is advised should any side effects or rash develops they are to stop the Lamictal immediately and contact this APRN/this office or go to the emergency department immediately. The patient verbalizes understanding and agreement  with treatment plan in their own words.      Medication and treatment options, both pharmacological and non-pharmacological treatment options, discussed during today's visit, including any off label use of medication. Patient acknowledged and verbally consented with current treatment plan and was educated on the importance of compliance with treatment and follow-up appointments.        MEDICATION ISSUES:    Discussed treatment plan and medication options of prescribed medication as well as the risks, benefits, any black box warnings, and side effects including potential falls, possible impaired driving, and metabolic adversities among others, including any off label use of medication. Patient is agreeable to call the office with any worsening of symptoms or onset of side effects, or if any concerns or questions arise.  The contact information for the office is made available to the patient. Patient is agreeable to call 911 or go to the nearest ER should they begin having any SI/HI, or if any urgent concerns arise.       SUICIDE RISK ASSESSMENT: Unalterable demographics and a history of mental health intervention indicate this patient is in a high risk category compared to the general population. At present, the patient denies active SI/HI, intentions, or plans at this time and agrees to seek immediate care should such thoughts develop. The patient verbalizes understanding of how to access emergency care if needed and agrees to do so. Consideration of suicide risk and protective factors such as history, current presentation, individual strengths and weaknesses, psychosocial and environmental stressors and variables, psychiatric illness and symptoms, medical conditions and pain, took place in this interview. Based on those considerations, the patient is determined: within individual baseline and presenting no imminent risk for suicide or homicide. Other recommendations: The patient does not meet the criteria for  inpatient admission and is not a safety risk to self or others at today's visit. Inpatient treatment offers no significant advantages over outpatient treatment for this patient at today's visit.      SAFETY PLAN:  Patient was given ample time for questions and fully participated in treatment planning.  Patient was encouraged to call the clinic with any questions or concerns.  Patient was informed of access to emergency care. If patient were to develop any significant symptomatology, suicidal ideation, homicidal ideation, any concerns, or feel unsafe at any time they are to call the clinic and if unable to get immediate assistance should immediately call 911 or go to the nearest emergency room.  The patient is advised to remove or secure (lock away) all lethal weapons (including guns) and sharps (including razors, scissors, knives, etc.).  All medications (including any prescribed and any over the counter medications) should be stored in a safe and secured location that is not obtainable by children/adolescents.  Patient was given an opportunity and encouraged to ask questions about their medication, illness, and treatment. Patient contracted verbally for the following: If you are experiencing an emotional crisis or have thoughts of harming yourself or others, please go to your nearest local emergency room or call 911. Will continue to re-assess medication response and side effects frequently to establish efficacy and ensure safety. Risks, any black box warnings, side effects, off label usage, and benefits of medication and treatment discussed with patient, along with potential adverse side effects of current and/or newly prescribed medication, alternative treatment options, and OTC medications.  Patient verbalized understanding of potential risks, any off label use of medication, any black box warnings, and any side effects in their own words. The patient verbalized understanding and agreed to comply with the safety  plan discussed in their own words.  Patient given the number to the office. Number also available to the 24- hour suicide hotline.      MEDS ORDERED DURING VISIT:  New Medications Ordered This Visit   Medications    ARIPiprazole (Abilify) 10 MG tablet     Sig: Take 1/2 tablet PO Daily x7 days, then increase to 1 tablet PO Daily     Dispense:  30 tablet     Refill:  0    lamoTRIgine (LaMICtal) 100 MG tablet     Sig: Take 1 tablet by mouth Daily.     Dispense:  30 tablet     Refill:  0    busPIRone (BUSPAR) 15 MG tablet     Sig: Take 1 tablet by mouth 2 (Two) Times a Day.     Dispense:  60 tablet     Refill:  0    buPROPion XL (WELLBUTRIN XL) 300 MG 24 hr tablet     Sig: Take 1 tablet by mouth Every Morning.     Dispense:  30 tablet     Refill:  0       Return in about 3 weeks (around 12/11/2023), or if symptoms worsen or fail to improve, for Recheck.     Treatment plan completed: 1/26/23    Progress toward goal: Not at goal    Functional Status: Mild impairment     Prognosis: Good with Ongoing Treatment         This document has been electronically signed by JENNIFER Reed  November 20, 2023 17:10 EST     Some of the data in this electronic note has been brought forward from a previous encounter, any necessary changes have been made, it has been reviewed by this APRN, and it is accurate.    Please note that portions of this note were completed with a voice recognition program. Efforts were made to edit dictation, but occasionally words are mistranscribed.

## 2023-11-26 DIAGNOSIS — F31.30 BIPOLAR I DISORDER, MOST RECENT EPISODE DEPRESSED: Chronic | ICD-10-CM

## 2023-11-26 DIAGNOSIS — F41.1 GENERALIZED ANXIETY DISORDER: Chronic | ICD-10-CM

## 2023-11-27 RX ORDER — LAMOTRIGINE 100 MG/1
100 TABLET ORAL DAILY
Qty: 30 TABLET | Refills: 0 | OUTPATIENT
Start: 2023-11-27

## 2023-11-27 RX ORDER — BUSPIRONE HYDROCHLORIDE 15 MG/1
15 TABLET ORAL 2 TIMES DAILY
Qty: 60 TABLET | Refills: 0 | OUTPATIENT
Start: 2023-11-27

## 2023-11-27 RX ORDER — BUPROPION HYDROCHLORIDE 300 MG/1
300 TABLET ORAL EVERY MORNING
Qty: 30 TABLET | Refills: 0 | OUTPATIENT
Start: 2023-11-27

## 2023-11-27 RX ORDER — BUPROPION HYDROCHLORIDE 150 MG/1
TABLET ORAL
Qty: 30 TABLET | Refills: 1 | OUTPATIENT
Start: 2023-11-27

## 2023-12-12 ENCOUNTER — TELEMEDICINE (OUTPATIENT)
Dept: PSYCHIATRY | Facility: CLINIC | Age: 33
End: 2023-12-12
Payer: COMMERCIAL

## 2023-12-12 ENCOUNTER — OFFICE VISIT (OUTPATIENT)
Dept: SLEEP MEDICINE | Facility: CLINIC | Age: 33
End: 2023-12-12
Payer: COMMERCIAL

## 2023-12-12 VITALS
HEIGHT: 60 IN | SYSTOLIC BLOOD PRESSURE: 128 MMHG | TEMPERATURE: 98.4 F | BODY MASS INDEX: 57.52 KG/M2 | DIASTOLIC BLOOD PRESSURE: 68 MMHG | HEART RATE: 82 BPM | OXYGEN SATURATION: 94 % | WEIGHT: 293 LBS

## 2023-12-12 DIAGNOSIS — G47.33 OSA (OBSTRUCTIVE SLEEP APNEA): Primary | ICD-10-CM

## 2023-12-12 DIAGNOSIS — F41.1 GENERALIZED ANXIETY DISORDER: Chronic | ICD-10-CM

## 2023-12-12 DIAGNOSIS — F15.21: ICD-10-CM

## 2023-12-12 DIAGNOSIS — F31.30 BIPOLAR I DISORDER, MOST RECENT EPISODE DEPRESSED: Primary | Chronic | ICD-10-CM

## 2023-12-12 PROCEDURE — 99213 OFFICE O/P EST LOW 20 MIN: CPT | Performed by: NURSE PRACTITIONER

## 2023-12-12 PROCEDURE — 99214 OFFICE O/P EST MOD 30 MIN: CPT | Performed by: NURSE PRACTITIONER

## 2023-12-12 PROCEDURE — 1160F RVW MEDS BY RX/DR IN RCRD: CPT | Performed by: NURSE PRACTITIONER

## 2023-12-12 PROCEDURE — 1159F MED LIST DOCD IN RCRD: CPT | Performed by: NURSE PRACTITIONER

## 2023-12-12 RX ORDER — BUPROPION HYDROCHLORIDE 300 MG/1
300 TABLET ORAL EVERY MORNING
Qty: 90 TABLET | Refills: 0 | Status: SHIPPED | OUTPATIENT
Start: 2023-12-12

## 2023-12-12 RX ORDER — BUSPIRONE HYDROCHLORIDE 15 MG/1
15 TABLET ORAL 2 TIMES DAILY
Qty: 180 TABLET | Refills: 0 | Status: SHIPPED | OUTPATIENT
Start: 2023-12-12

## 2023-12-12 RX ORDER — ARIPIPRAZOLE 10 MG/1
TABLET ORAL
Qty: 90 TABLET | Refills: 0 | Status: SHIPPED | OUTPATIENT
Start: 2023-12-12

## 2023-12-12 RX ORDER — LAMOTRIGINE 100 MG/1
100 TABLET ORAL DAILY
Qty: 90 TABLET | Refills: 0 | Status: SHIPPED | OUTPATIENT
Start: 2023-12-12

## 2023-12-12 NOTE — TREATMENT PLAN
Multi-Disciplinary Problems (from Behavioral Health Treatment Plan)      Active Problems       Problem: Anxiety  Start Date: 12/12/23      Problem Details: The patient self-scales this problem as a 2 with 10 being the worst.          Goal Priority Start Date Expected End Date End Date    Patient will develop and implement behavioral and cognitive strategies to reduce anxiety and irrational fears. -- 12/12/23 -- --    Goal Details: Progress toward goal:  The patient self-scales their progress related to this goal as a 2 with 10 being the worst.          Goal Intervention Frequency Start Date End Date    Help patient explore past emotional issues in relation to present anxiety. Q Month 12/12/23 --    Intervention Details: Duration of treatment until until discharged.          Goal Intervention Frequency Start Date End Date    Help patient develop an awareness of their cognitive and physical responses to anxiety. Q Month 12/12/23 --    Intervention Details: Duration of treatment until until discharged.                  Problem: Mood Instability  Start Date: 12/12/23      Problem Details: The patient self-scales this problem as a 3 with 10 being the worst.          Goal Priority Start Date Expected End Date End Date    Patient will achieve mood stability as evidenced by controlled behavior and a more deliberate thought process -- 12/12/23 -- --    Goal Details: Progress toward goal:  The patient self-scales their progress related to this goal as a 3 with 10 being the worst.          Goal Intervention Frequency Start Date End Date    Provide structure and focus to patient's thoughts and actions by establishing plans and routine. Q Month 12/12/23 --    Intervention Details: Duration of treatment until until discharged.          Goal Intervention Frequency Start Date End Date    Assist patient in setting responsible goals and limits in behavior. Q Month 12/12/23 --    Intervention Details: Duration of treatment until until  discharged.                                 I have discussed and reviewed this treatment plan with the patient and/or guardian.  The patient has verbally agreed with this treatment plan (no signatures are obtained at today's visit as the patient is a telehealth patient and is unable to print and sign this document, therefore verbal agreement is obtained).

## 2023-12-12 NOTE — PROGRESS NOTES
This provider is completing this appointment through [facility] (through [facility]), [address], [place], [address] using a secure [employer] Video Visit through [employer]. Patient is being seen remotely via telehealth in [place], and stated they are in a secure environment for this session. The patient's condition being diagnosed/treated is appropriate for telemedicine. The provider identified herself as well as her credentials. The patient, and/or patients guardian, consent to be seen remotely, and when consent is given they understand that the consent allows for patient identifiable information to be sent to a third party as needed. They may refuse to be seen remotely at any time. The electronic data is encrypted and password protected, and the patient and/or guardian has been advised of the potential risks to privacy not withstanding such measures.    You have chosen to receive care through a telehealth visit.  Do you consent to use a video/audio connection for your medical care today? Yes    Patient identifiers utilized: Name and date of birth.        Subjective   Franca Ruiz is a 33 y.o. female who presents today for follow up    Chief Complaint:  Bipolar disorder, anxiety    Accompanied by: Pt was alone for duration of appointment    History of Present Illness:   Pt states she has been doing well overall. Pt reports her mood has been stable despite the recent changes. Pt denies any fluctuations. Pt is tolerating the Abilify without issue. Appetite has been stable. Pt continues to get quality sleep with the CPAP. Pt is done with her school semester, she finished with two A's and a B. Pt is taking four courses next semester. Pt is working about 50 hours a week and plans on finding a new job that offers medical benefits sometime soon. Pt is going back home for [place] to visit with her father, brother, and his family. Pt has maintained sobriety and celebrates  three years on January 21st. Due to pt losing her Medicaid benefits, discussed the most inexpensive pharmacy to pay out of pocket for medications using [employer] the next time she needs refills. The patient denies any new medical problems since last appointment with this facility. The patient reports compliance with current medication regimen. The patient denies any current side effects from their current medication regimen. The patient denies any abnormal muscle movements or tics. The patient rates their depression on average in the past week at a 3/10 on a 0-10 scale, with 10 being the worst. The patient rates their anxiety on average the past week at a 2/10 on a 0-10 scale, with 10 being the worst. The patient would like to not adjust or change their medications at this visit. The patient denies any suicidal or homicidal ideations, plans, or intent at today's encounter and is convincing. The patient denies any auditory hallucinations or visual hallucinations. The patient does not endorse any significant symptoms consistent with leidy or psychosis at today's encounter.     *If the patient has any concerns or needs assistance, they may call the [facility] at [phone]*        Prior Psychiatric Medications:  Wellbutrin XL: is helpful  Naltrexone: is helpful   Hydroxyzine: helps, but is sedating  Trazodone: takes PRN and is helpful  Buspar: unsure if effective  Depakote ER: may be beneficial  Paxil:   Zoloft  Celexa  Prozac: may have been helpful  Cymbalta: may have been helpful  Seroquel: side effects; too sedating  Abilify: sedating  Vraylar: helpful        The following portions of the patient's history were reviewed and updated as appropriate: allergies, current medications, past family history, past medical history, past social history, past surgical history and problem list.          Past Medical History:  Past Medical History:   Diagnosis Date    Allergic     Seasonal, controlled  with 2nd gen antihistamine    Anxiety     Arthritis     Bilateral ovarian cysts     Bipolar disorder     managed by [facility], [facility]    Chronic pain     Depression     Obesity     Seizures     denied by patient    Sleep apnea     CPAP, managed by [facility]    Substance abuse     [facility]       Social History:  Social History     Socioeconomic History    Marital status: Single   Tobacco Use    Smoking status: Former     Types: Electronic Cigarette     Quit date:      Years since quittin.9    Smokeless tobacco: Never   Vaping Use    Vaping Use: Every day    Substances: Nicotine, Flavoring   Substance and Sexual Activity    Alcohol use: Never    Drug use: Not Currently     Types: Amphetamines, Benzodiazepines, Fentanyl, Hydrocodone, Marijuana, MDMA (ecstacy), Methamphetamines, Morphine, Oxycodone, PCP     Comment: 2021 (Last use)    Sexual activity: Not Currently     Partners: Male     Birth control/protection: Condom, Abstinence       Family History:  Family History   Problem Relation Age of Onset    Mental illness Mother     Arthritis Father     Hypertension Father     Mental illness Father     Obesity Father     Bipolar disorder Father     Alcohol abuse Father     Sleep apnea Father     Kidney disease Paternal Aunt     Mental illness Paternal Aunt     Bipolar disorder Paternal Aunt     Cancer Paternal Aunt         unspecified    Mental illness Paternal Uncle     Cancer Paternal Uncle         unspecified    Cancer Paternal Grandmother         unspecified    Sleep apnea Other     Asthma Other     COPD Other     Restless legs syndrome Other        Past Surgical History:  Past Surgical History:   Procedure Laterality Date    ANKLE SURGERY Right     CARPAL TUNNEL RELEASE Left 2022    CARPAL TUNNEL RELEASE Right 2022    WISDOM TOOTH EXTRACTION         Problem List:  Patient Active Problem List   Diagnosis    Moderate episode of recurrent major depressive disorder     Sleep apnea    Bilateral carpal tunnel syndrome    Morbid obesity with BMI of 50.0-59.9, adult    History of substance abuse    Bipolar disorder    Substance abuse    Sleep apnea    Insulin resistance    Pure hypercholesterolemia       Allergy:   Allergies   Allergen Reactions    Augmentin [Amoxicillin-Pot Clavulanate] Other (See Comments)     Throat blisters    Crow Flavor [Flavoring Agent] Unknown - Low Severity    Vantin [Cefpodoxime] Other (See Comments)     Throat blisters        Current Medications:   Current Outpatient Medications   Medication Sig Dispense Refill    ARIPiprazole (Abilify) 10 MG tablet Take 1 tablet PO Daily 90 tablet 0    buPROPion XL (WELLBUTRIN XL) 300 MG 24 hr tablet Take 1 tablet by mouth Every Morning. 90 tablet 0    busPIRone (BUSPAR) 15 MG tablet Take 1 tablet by mouth 2 (Two) Times a Day. 180 tablet 0    lamoTRIgine (LaMICtal) 100 MG tablet Take 1 tablet by mouth Daily. 90 tablet 0    buprenorphine-naloxone (SUBOXONE) 8-2 MG per SL tablet 1 tablet 2 (Two) Times a Day.      phentermine 15 MG capsule Take 1 capsule by mouth Every Morning for 30 days. 30 capsule 0    psyllium (METAMUCIL) 0.52 g capsule Take 2 capsules by mouth 2 (Two) Times a Day. Titrate up as directed. Take with a minimum of 8oz water. 120 capsule 11    topiramate (Topamax) 25 MG tablet Take one daily at bedtime for a week then increase to 2 daily at bedtime (Patient taking differently: Take 2 tablets PO QHS) 60 tablet 2     Current Facility-Administered Medications   Medication Dose Route Frequency Provider Last Rate Last Admin    cyanocobalamin injection 1,000 mcg  1,000 mcg Intramuscular Q28 Days Gracy Singh APRN   1,000 mcg at 02/07/23 1607       Review of Symptoms:    Review of Systems   Constitutional: Negative.    Psychiatric/Behavioral: Negative.           Physical Exam:   Due to the remote nature of this encounter (virtual encounter), vitals were unable to be obtained.  Height stated at 61 inches.   Weight stated at 310 pounds.        Physical Exam  Neurological:      Mental Status: She is alert.   Psychiatric:         Attention and Perception: Attention and perception normal. She does not perceive auditory or visual hallucinations.         Mood and Affect: Mood normal.         Speech: Speech normal.         Behavior: Behavior normal. Behavior is cooperative.         Thought Content: Thought content normal. Thought content is not paranoid or delusional. Thought content does not include homicidal or suicidal ideation. Thought content does not include homicidal or suicidal plan.         Cognition and Memory: Cognition and memory normal.         Judgment: Judgment normal.      Comments: Restricted affect.            Mental Status Exam:   Hygiene:   good  Cooperation:  Cooperative  Eye Contact:  Good  Psychomotor Behavior:  Appropriate  Affect:  Restricted  Mood: normal  Speech:  Normal  Thought Process:  Linear  Thought Content:  Normal  Suicidal:  None  Homicidal:  None  Hallucinations:  None  Delusion:  None  Memory:  Intact  Orientation:  Person, Place, Time and Situation  Reliability:  good  Insight:  Good  Judgement:  Good  Impulse Control:  Good        PHQ-9 Depression Screening  Little interest or pleasure in doing things? (P) 0-->not at all   Feeling down, depressed, or hopeless? (P) 1-->several days   Trouble falling or staying asleep, or sleeping too much? (P) 0-->not at all   Feeling tired or having little energy? (P) 1-->several days   Poor appetite or overeating? (P) 1-->several days   Feeling bad about yourself - or that you are a failure or have let yourself or your family down? (P) 1-->several days   Trouble concentrating on things, such as reading the newspaper or watching television? (P) 1-->several days   Moving or speaking so slowly that other people could have noticed? Or the opposite - being so fidgety or restless that you have been moving around a lot more than usual? (P) 0-->not at all    Thoughts that you would be better off dead, or of hurting yourself in some way? (P) 0-->not at all   PHQ-9 Total Score (P) 5   If you checked off any problems, how difficult have these problems made it for you to do your work, take care of things at home, or get along with other people? (P) somewhat difficult     PHQ-9 Total Score: (P) 5      DEWAYNE-7  Feeling nervous, anxious or on edge: (P) Not at all  Not being able to stop or control worrying: (P) Not at all  Worrying too much about different things: (P) Not at all  Trouble Relaxing: (P) Several days  Being so restless that it is hard to sit still: (P) Not at all  Feeling afraid as if something awful might happen: (P) Several days  Becoming easily annoyed or irritable: (P) Several days  DEWAYNE 7 Total Score: (P) 3  If you checked any problems, how difficult have these problems made it for you to do your work, take care of things at home, or get along with other people: (P) Somewhat difficult      Previous Provider notes and available records reviewed by this APRN at today's encounter.         Lab Results:   No visits with results within 1 Month(s) from this visit.   Latest known visit with results is:   Admission on 10/08/2023, Discharged on 10/08/2023   Component Date Value Ref Range Status    SARS Antigen 10/08/2023 Not Detected  Not Detected, Presumptive Negative Final    Influenza A Antigen GABRIELLE 10/08/2023 Not Detected  Not Detected Final    Influenza B Antigen GABRIELLE 10/08/2023 Not Detected  Not Detected Final    Internal Control 10/08/2023 Passed  Passed Final    Lot Number 10/08/2023 [id number]   Final    Expiration Date 10/08/2023 10-   Final         Assessment & Plan   Problems Addressed this Visit    None  Visit Diagnoses       Bipolar I disorder, most recent episode depressed  (Chronic)   -  Primary    Relevant Medications    lamoTRIgine (LaMICtal) 100 MG tablet    busPIRone (BUSPAR) 15 MG tablet    buPROPion XL (WELLBUTRIN XL) 300 MG 24 hr tablet     ARIPiprazole (Abilify) 10 MG tablet    Generalized anxiety disorder  (Chronic)       Relevant Medications    busPIRone (BUSPAR) 15 MG tablet    buPROPion XL (WELLBUTRIN XL) 300 MG 24 hr tablet    ARIPiprazole (Abilify) 10 MG tablet    Amphetamine-type substance use disorder, moderate, in sustained remission              Diagnoses         Codes Comments    Bipolar I disorder, most recent episode depressed    -  Primary ICD-10-CM: F31.30  ICD-9-CM: 296.50     Generalized anxiety disorder     ICD-10-CM: F41.1  ICD-9-CM: 300.02     Amphetamine-type substance use disorder, moderate, in sustained remission     ICD-10-CM: F15.21  ICD-9-CM: 304.43             Visit Diagnoses:    ICD-10-CM ICD-9-CM   1. Bipolar I disorder, most recent episode depressed  F31.30 296.50   2. Generalized anxiety disorder  F41.1 300.02   3. Amphetamine-type substance use disorder, moderate, in sustained remission  F15.21 304.43              GOALS:  Short Term Goals: Patient will be compliant with medication, and patient will have no significant medication related side effects. Patient will be engaged in psychotherapy as indicated.  Patient will report subjective improvement of symptoms.  Long term goals: To stabilize mood and treat/improve subjective symptoms, the patient will stay out of the hospital, the patient will be at an optimal level of functioning, and the patient will take all medications as prescribed.  The patient verbalized understanding and agreement with goals that were mutually set.      TREATMENT PLAN:   -Continue Buspar 15 mg PO BID for anxiety   -Pt is prescribed Suboxone from another provider.   -Continue Wellbutrin  mg PO QAM for bipolar depression  -Continue Abilify 10 mg PO Daily for bipolar disorder  -Continue Lamictal 100 mg PO Daily for mood stabilization  -Pt requests 12 week follow-up due to losing her insurance. Pt has agreed to call this APRN if she experiences any symptoms of leidy/hypomania and/or  depression.    Discussed medication options and treatment plan of prescribed medication, any off label use of medication, as well as the risks, benefits, any black box warnings including increased suicidality, and side effects including but not limited to potential falls, dizziness, possible impaired driving, GI side effects (change in appetite, abdominal discomfort, nausea, vomiting, diarrhea, and/or constipation), dry mouth, somnolence, sedation, insomnia, activation, agitation, irritation, tremors, abnormal muscle movements or disorders, tardive dyskinesia, akathisia, asthenia, headache, sweating, possible bruising or rare bleeding, electrolyte and/or fluid abnormalities, change in blood pressure/heart rate/and or heart rhythm, hypotension, sexual dysfunction, rare impulse control problems, rare seizures, rare neuroleptic malignant syndrome, increased risk of death and cerebrovascular events, change in blood glucose and increased risk for diabetes, change in triglycerides and cholesterol and increased risk for dyslipidemia,  weight gain, weight gain that can become problematic to health, skin conditions and reactions, and metabolic adversities among others. Patient and/or guardian are agreeable to call the office with any worsening of symptoms or onset of side effects, or if any concerns or questions arise.  The contact information for the office is made available to the patient and/or guardian. Patient and/or guardian are agreeable to call 911 or go to the nearest ER should they begin having any SI/HI, or if any urgent concerns arise.    This APRN has discussed the benefits and risks of taking/continuing Lamictal (Lamotrigine).  The side effects of Lamictal can include a benign rash, blurred or double vision, dizziness, ataxia, sedation, headache, tremor, insomnia, poor coordination, fatigue,  nausea, vomiting, dyspepsia, rhinitis, infection, pharyngitis, asthenia, a rare but serious rash, rare multi-organ  failure associated with Nesbitt-Lul Syndrome, toxic epidermal necrolysis, drug hypersensitivity syndrome, rare blood dyscrasias, rare aseptic meningitis, rare sudden unexplained deaths in people with epilepsy, withdrawal seizures upon abrupt withdrawal, and rare activation of suicidal ideation and behavior (suicidality).  This APRN has discussed that a very slow dose titration when starting, or changing doses, of Lamictal may reduce the incidence of skin rash and other side effects.  The dosage should not be titrated upwards or increased faster than recommended due to the possibility of the discussed side effects and risk of development of a skin rash (which can become life threatening).    This APRN has also discussed that if the patient stops taking the Lamictal for 3-5 days or longer, it will be necessary to restart the drug with an initial dose titration, as rashes have been reported on reexposure.  If the patient and Provider decide to stop the Lamictal, the patient will follow the directions of this APRN/this office as a guided taper over about two weeks is appropriate due to the risk of relapse in bipolar disorder with those with a mood or bipolar disorder, the risk of seizures in those with epilepsy, and discontinuation symptoms upon rapid discontinuation of Lamictal.    The patient verbalizes understanding of benefits and risks as discussed, the patient/guardian feels the benefits outweigh the risks and is agreeable to continue/take Lamictal as discussed.  The patient is advised should any side effects or rash develops they are to stop the Lamictal immediately and contact this APRN/this office or go to the emergency department immediately. The patient verbalizes understanding and agreement with treatment plan in their own words.    Medication and treatment options, both pharmacological and non-pharmacological treatment options, discussed during today's visit, including any off label use of medication.  Patient acknowledged and verbally consented with current treatment plan and was educated on the importance of compliance with treatment and follow-up appointments.        MEDICATION ISSUES:    Discussed treatment plan and medication options of prescribed medication as well as the risks, benefits, any black box warnings, and side effects including potential falls, possible impaired driving, and metabolic adversities among others, including any off label use of medication. Patient is agreeable to call the office with any worsening of symptoms or onset of side effects, or if any concerns or questions arise.  The contact information for the office is made available to the patient. Patient is agreeable to call 911 or go to the nearest ER should they begin having any SI/HI, or if any urgent concerns arise.       SUICIDE RISK ASSESSMENT: Unalterable demographics and a history of mental health intervention indicate this patient is in a high risk category compared to the general population. At present, the patient denies active SI/HI, intentions, or plans at this time and agrees to seek immediate care should such thoughts develop. The patient verbalizes understanding of how to access emergency care if needed and agrees to do so. Consideration of suicide risk and protective factors such as history, current presentation, individual strengths and weaknesses, psychosocial and environmental stressors and variables, psychiatric illness and symptoms, medical conditions and pain, took place in this interview. Based on those considerations, the patient is determined: within individual baseline and presenting no imminent risk for suicide or homicide. Other recommendations: The patient does not meet the criteria for inpatient admission and is not a safety risk to self or others at today's visit. Inpatient treatment offers no significant advantages over outpatient treatment for this patient at today's visit.      SAFETY PLAN:  Patient  was given ample time for questions and fully participated in treatment planning.  Patient was encouraged to call the clinic with any questions or concerns.  Patient was informed of access to emergency care. If patient were to develop any significant symptomatology, suicidal ideation, homicidal ideation, any concerns, or feel unsafe at any time they are to call the clinic and if unable to get immediate assistance should immediately call 911 or go to the nearest emergency room.  The patient is advised to remove or secure (lock away) all lethal weapons (including guns) and sharps (including razors, scissors, knives, etc.).  All medications (including any prescribed and any over the counter medications) should be stored in a safe and secured location that is not obtainable by children/adolescents.  Patient was given an opportunity and encouraged to ask questions about their medication, illness, and treatment. Patient contracted verbally for the following: If you are experiencing an emotional crisis or have thoughts of harming yourself or others, please go to your nearest local emergency room or call 911. Will continue to re-assess medication response and side effects frequently to establish efficacy and ensure safety. Risks, any black box warnings, side effects, off label usage, and benefits of medication and treatment discussed with patient, along with potential adverse side effects of current and/or newly prescribed medication, alternative treatment options, and OTC medications.  Patient verbalized understanding of potential risks, any off label use of medication, any black box warnings, and any side effects in their own words. The patient verbalized understanding and agreed to comply with the safety plan discussed in their own words.  Patient given the number to the office. Number also available to the 24- hour suicide hotline.      MEDS ORDERED DURING VISIT:  New Medications Ordered This Visit   Medications     lamoTRIgine (LaMICtal) 100 MG tablet     Sig: Take 1 tablet by mouth Daily.     Dispense:  90 tablet     Refill:  0    busPIRone (BUSPAR) 15 MG tablet     Sig: Take 1 tablet by mouth 2 (Two) Times a Day.     Dispense:  180 tablet     Refill:  0    buPROPion XL (WELLBUTRIN XL) 300 MG 24 hr tablet     Sig: Take 1 tablet by mouth Every Morning.     Dispense:  90 tablet     Refill:  0    ARIPiprazole (Abilify) 10 MG tablet     Sig: Take 1 tablet PO Daily     Dispense:  90 tablet     Refill:  0       Return in about 12 weeks (around 3/5/2024), or if symptoms worsen or fail to improve, for Recheck.     Treatment plan completed: 12/12/23    Progress toward goal: Not at goal    Functional Status: Mild impairment     Prognosis: Good with Ongoing Treatment         This document has been electronically signed by JENNIFER Reed  December 12, 2023 16:25 EST     Some of the data in this electronic note has been brought forward from a previous encounter, any necessary changes have been made, it has been reviewed by this APRN, and it is accurate.    Please note that portions of this note were completed with a voice recognition program. Efforts were made to edit dictation, but occasionally words are mistranscribed.

## 2023-12-12 NOTE — PROGRESS NOTES
Chief Complaint:   Chief Complaint   Patient presents with    Sleep Apnea    Follow-up       HPI:    Franca Ruiz is a 33 y.o. female here for follow-up of sleep apnea.  Patient was last seen 12/12/2022.  Patient continues to do well with CPAP therapy.  Patient is sleeping 8 hours nightly and does feel rested upon awakening.  Patient will go to sleep within 20 to 30 minutes is very regular she gets up during the night.  Patient has an [place] score of 1/24.  Patient states she is doing well without concerns or complaints and wishes to continue therapy.        Current medications are:   Current Outpatient Medications:     ARIPiprazole (Abilify) 10 MG tablet, Take 1/2 tablet PO Daily x7 days, then increase to 1 tablet PO Daily, Disp: 30 tablet, Rfl: 0    buprenorphine-naloxone (SUBOXONE) 8-2 MG per SL tablet, 1 tablet 2 (Two) Times a Day., Disp: , Rfl:     buPROPion XL (WELLBUTRIN XL) 300 MG 24 hr tablet, Take 1 tablet by mouth Every Morning., Disp: 30 tablet, Rfl: 0    busPIRone (BUSPAR) 15 MG tablet, Take 1 tablet by mouth 2 (Two) Times a Day., Disp: 60 tablet, Rfl: 0    lamoTRIgine (LaMICtal) 100 MG tablet, Take 1 tablet by mouth Daily., Disp: 30 tablet, Rfl: 0    psyllium (METAMUCIL) 0.52 g capsule, Take 2 capsules by mouth 2 (Two) Times a Day. Titrate up as directed. Take with a minimum of 8oz water., Disp: 120 capsule, Rfl: 11    phentermine 15 MG capsule, Take 1 capsule by mouth Every Morning for 30 days., Disp: 30 capsule, Rfl: 0    topiramate (Topamax) 25 MG tablet, Take one daily at bedtime for a week then increase to 2 daily at bedtime (Patient taking differently: Take 2 tablets PO QHS), Disp: 60 tablet, Rfl: 2    Current Facility-Administered Medications:     cyanocobalamin injection 1,000 mcg, 1,000 mcg, Intramuscular, Q28 Days, Gracy Singh, APRN, 1,000 mcg at 02/07/23 1607.      The patient's relevant past medical, surgical, family and social history were reviewed and updated in Epic as  appropriate.       Review of Systems   Respiratory:  Positive for apnea.    Neurological:  Positive for headaches.   Psychiatric/Behavioral:  Positive for behavioral problems, decreased concentration, dysphoric mood and sleep disturbance. The patient is nervous/anxious.    All other systems reviewed and are negative.        Objective:    Physical Exam  Constitutional:       Appearance: Normal appearance.   HENT:      Head: Normocephalic and atraumatic.      Mouth/Throat:      Comments: Mallampati 4 anatomy  Cardiovascular:      Rate and Rhythm: Normal rate and regular rhythm.   Pulmonary:      Effort: Pulmonary effort is normal. No respiratory distress.   Skin:     General: Skin is warm and dry.   Neurological:      Mental Status: She is alert and oriented to person, place, and time.   Psychiatric:         Mood and Affect: Mood normal.         Behavior: Behavior normal.         Thought Content: Thought content normal.         Judgment: Judgment normal.         CPAP Report  90/90 days of use  Greater than 4-hour use 94%  Setting 8-18  95th percentile pressure 9.8  AHI 0.5    The patient continues to use and benefit from CPAP therapy.    ASSESSMENT/PLAN    Diagnoses and all orders for this visit:    1. JULIAN (obstructive sleep apnea) (Primary)  -     PAP Therapy        Counseled patient regarding multimodal approach with healthy nutrition, healthy sleep, regular physical activity, social activities, counseling, and medications. Encouraged to practice lateral sleep position. Avoid alcohol and sedatives close to bedtime.  Refill supplies x 1 year.  Return to clinic 1 year or sooner as symptoms warrant.      I have reviewed the results of my evaluation and impression and discussed my recommendations in detail with the patient.      Signed by  JENNIFER Mullins    December 12, 2023      CC: Amisha Bravo DO         No ref. provider found

## 2024-01-02 DIAGNOSIS — F31.30 BIPOLAR I DISORDER, MOST RECENT EPISODE DEPRESSED: Chronic | ICD-10-CM

## 2024-01-02 DIAGNOSIS — F41.1 GENERALIZED ANXIETY DISORDER: Chronic | ICD-10-CM

## 2024-01-02 RX ORDER — CARIPRAZINE 3 MG/1
3 CAPSULE, GELATIN COATED ORAL DAILY
Qty: 30 CAPSULE | Refills: 1 | OUTPATIENT
Start: 2024-01-02

## 2024-02-14 ENCOUNTER — TELEMEDICINE (OUTPATIENT)
Dept: PSYCHIATRY | Facility: CLINIC | Age: 34
End: 2024-02-14
Payer: COMMERCIAL

## 2024-02-14 DIAGNOSIS — F15.21: ICD-10-CM

## 2024-02-14 DIAGNOSIS — F31.30 BIPOLAR I DISORDER, MOST RECENT EPISODE DEPRESSED: Primary | Chronic | ICD-10-CM

## 2024-02-14 DIAGNOSIS — F41.1 GENERALIZED ANXIETY DISORDER: Chronic | ICD-10-CM

## 2024-02-14 PROCEDURE — 1160F RVW MEDS BY RX/DR IN RCRD: CPT | Performed by: NURSE PRACTITIONER

## 2024-02-14 PROCEDURE — 99214 OFFICE O/P EST MOD 30 MIN: CPT | Performed by: NURSE PRACTITIONER

## 2024-02-14 PROCEDURE — 1159F MED LIST DOCD IN RCRD: CPT | Performed by: NURSE PRACTITIONER

## 2024-02-14 RX ORDER — BUSPIRONE HYDROCHLORIDE 15 MG/1
15 TABLET ORAL 2 TIMES DAILY
Qty: 180 TABLET | Refills: 0 | Status: SHIPPED | OUTPATIENT
Start: 2024-02-14

## 2024-02-14 RX ORDER — BUPROPION HYDROCHLORIDE 300 MG/1
300 TABLET ORAL EVERY MORNING
Qty: 90 TABLET | Refills: 0 | Status: SHIPPED | OUTPATIENT
Start: 2024-02-14

## 2024-02-14 RX ORDER — ARIPIPRAZOLE 10 MG/1
TABLET ORAL
Qty: 90 TABLET | Refills: 0 | Status: SHIPPED | OUTPATIENT
Start: 2024-02-14

## 2024-02-14 RX ORDER — BUPROPION HYDROCHLORIDE 150 MG/1
TABLET ORAL
Qty: 90 TABLET | Refills: 0 | Status: SHIPPED | OUTPATIENT
Start: 2024-02-14

## 2024-02-14 RX ORDER — LAMOTRIGINE 100 MG/1
100 TABLET ORAL DAILY
Qty: 90 TABLET | Refills: 0 | Status: SHIPPED | OUTPATIENT
Start: 2024-02-14

## 2024-05-02 DIAGNOSIS — F31.30 BIPOLAR I DISORDER, MOST RECENT EPISODE DEPRESSED: Chronic | ICD-10-CM

## 2024-05-02 DIAGNOSIS — F41.1 GENERALIZED ANXIETY DISORDER: Chronic | ICD-10-CM

## 2024-05-02 RX ORDER — BUSPIRONE HYDROCHLORIDE 15 MG/1
15 TABLET ORAL 2 TIMES DAILY
Qty: 60 TABLET | Refills: 0 | Status: SHIPPED | OUTPATIENT
Start: 2024-05-02

## 2024-05-02 RX ORDER — BUPROPION HYDROCHLORIDE 300 MG/1
300 TABLET ORAL EVERY MORNING
Qty: 30 TABLET | Refills: 0 | Status: SHIPPED | OUTPATIENT
Start: 2024-05-02

## 2024-05-02 RX ORDER — BUPROPION HYDROCHLORIDE 150 MG/1
TABLET ORAL
Qty: 30 TABLET | Refills: 0 | Status: SHIPPED | OUTPATIENT
Start: 2024-05-02

## 2024-05-02 RX ORDER — LAMOTRIGINE 100 MG/1
100 TABLET ORAL DAILY
Qty: 30 TABLET | Refills: 0 | Status: SHIPPED | OUTPATIENT
Start: 2024-05-02

## 2024-05-11 DIAGNOSIS — F31.30 BIPOLAR I DISORDER, MOST RECENT EPISODE DEPRESSED: Chronic | ICD-10-CM

## 2024-05-11 DIAGNOSIS — F41.1 GENERALIZED ANXIETY DISORDER: Chronic | ICD-10-CM

## 2024-05-13 RX ORDER — BUPROPION HYDROCHLORIDE 150 MG/1
TABLET ORAL
Qty: 90 TABLET | OUTPATIENT
Start: 2024-05-13

## 2025-03-14 ENCOUNTER — OFFICE VISIT (OUTPATIENT)
Dept: SLEEP MEDICINE | Facility: CLINIC | Age: 35
End: 2025-03-14
Payer: COMMERCIAL

## 2025-03-14 VITALS
BODY MASS INDEX: 57.52 KG/M2 | TEMPERATURE: 97.8 F | DIASTOLIC BLOOD PRESSURE: 72 MMHG | OXYGEN SATURATION: 95 % | SYSTOLIC BLOOD PRESSURE: 142 MMHG | HEIGHT: 60 IN | HEART RATE: 87 BPM | WEIGHT: 293 LBS

## 2025-03-14 DIAGNOSIS — G47.33 OSA (OBSTRUCTIVE SLEEP APNEA): Primary | ICD-10-CM

## 2025-03-14 PROCEDURE — 99213 OFFICE O/P EST LOW 20 MIN: CPT | Performed by: NURSE PRACTITIONER

## 2025-03-14 RX ORDER — LOSARTAN POTASSIUM 50 MG/1
50 TABLET ORAL DAILY
COMMUNITY

## 2025-03-14 NOTE — PROGRESS NOTES
Chief Complaint:   Chief Complaint   Patient presents with    Follow-up    Sleep Apnea       HPI:    Franca Ruiz is a 34 y.o. female here for follow-up of sleep apnea.  Patient was last seen 12/12/2023.  Patient continues to do well with CPAP therapy.  Patient is sleeping 8 hours nightly.  Patient goes to sleep within 20 to 30 minutes and does not get up during the night.  Patient has an [place] score of 2/24.  Patient does well with fullface mask and heated tubing.  Patient is not happy with current DME and wishes to change.  We will get her moved to [facility].        Current medications are:   Current Outpatient Medications:     buprenorphine-naloxone (SUBOXONE) 8-2 MG per SL tablet, 1 tablet 2 (Two) Times a Day., Disp: , Rfl:     buPROPion XL (WELLBUTRIN XL) 300 MG 24 hr tablet, Take 1 tablet by mouth Every Morning., Disp: 30 tablet, Rfl: 0    busPIRone (BUSPAR) 15 MG tablet, Take 1 tablet by mouth 2 (Two) Times a Day., Disp: 60 tablet, Rfl: 0    lamoTRIgine (LaMICtal) 100 MG tablet, Take 1 tablet by mouth Daily., Disp: 30 tablet, Rfl: 0    losartan (COZAAR) 50 MG tablet, Take 1 tablet by mouth Daily., Disp: , Rfl:     Current Facility-Administered Medications:     cyanocobalamin injection 1,000 mcg, 1,000 mcg, Intramuscular, Q28 Days, Gracy Singh APRN, 1,000 mcg at 02/07/23 1607.      The patient's relevant past medical, surgical, family and social history were reviewed and updated in Epic as appropriate.       Review of Systems   Eyes:  Positive for visual disturbance.   Respiratory:  Positive for apnea.    Neurological:  Positive for headaches.   Psychiatric/Behavioral:  Positive for behavioral problems, decreased concentration, dysphoric mood and sleep disturbance. The patient is nervous/anxious.    All other systems reviewed and are negative.        Objective:    Physical Exam  Constitutional:       Appearance: Normal appearance.   HENT:      Head: Normocephalic and atraumatic.       "Mouth/Throat:      Comments: Mallampati 4 anatomy  Cardiovascular:      Rate and Rhythm: Normal rate and regular rhythm.   Pulmonary:      Effort: Pulmonary effort is normal.      Breath sounds: Normal breath sounds.   Skin:     General: Skin is warm and dry.   Neurological:      Mental Status: She is alert and oriented to person, place, and time.   Psychiatric:         Mood and Affect: Mood normal.         Behavior: Behavior normal.         Thought Content: Thought content normal.         Judgment: Judgment normal.       /72   Pulse 87   Temp 97.8 °F (36.6 °C)   Ht 152.4 cm (60\")   Wt (!) 154 kg (340 lb)   SpO2 95%   BMI 66.40 kg/m²     CPAP Report  72/90 days of use  Greater than 4-hour use 78%  Setting 8-18  95th percentile pressure 11.3  AHI 1.3    The patient continues to use and benefit from CPAP therapy.    ASSESSMENT/PLAN    Diagnoses and all orders for this visit:    1. JULIAN (obstructive sleep apnea) (Primary)  -     Cancel: PAP Therapy  -     PAP Therapy      Refill supplies x 1 year.  Return to clinic 1 year or sooner if symptoms warrant.        Signed by  JENNIFER Mullins    March 14, 2025      CC: Amisha Bravo DO         No ref. provider found      "